# Patient Record
Sex: FEMALE | Race: WHITE | NOT HISPANIC OR LATINO | Employment: UNEMPLOYED | URBAN - METROPOLITAN AREA
[De-identification: names, ages, dates, MRNs, and addresses within clinical notes are randomized per-mention and may not be internally consistent; named-entity substitution may affect disease eponyms.]

---

## 2017-01-30 ENCOUNTER — OFFICE VISIT (OUTPATIENT)
Dept: FAMILY MEDICINE CLINIC | Facility: CLINIC | Age: 27
End: 2017-01-30

## 2017-01-30 VITALS
DIASTOLIC BLOOD PRESSURE: 70 MMHG | TEMPERATURE: 98.5 F | RESPIRATION RATE: 16 BRPM | BODY MASS INDEX: 27.28 KG/M2 | SYSTOLIC BLOOD PRESSURE: 100 MMHG | HEART RATE: 88 BPM | WEIGHT: 169 LBS

## 2017-01-30 DIAGNOSIS — J06.9 ACUTE URI: Primary | ICD-10-CM

## 2017-01-30 PROCEDURE — 99213 OFFICE O/P EST LOW 20 MIN: CPT | Performed by: FAMILY MEDICINE

## 2017-01-30 RX ORDER — AMOXICILLIN 500 MG/1
500 CAPSULE ORAL 3 TIMES DAILY
Qty: 30 CAPSULE | Refills: 0 | Status: SHIPPED | OUTPATIENT
Start: 2017-01-30 | End: 2017-02-09

## 2017-01-30 NOTE — MR AVS SNAPSHOT
Riana Terrazashadley   2017 11:30 AM   Office Visit    Provider:  George Hackett MD   Department:  [facility]   Dept Phone:  [phone]                Your Full Care Plan              Today's Medication Changes          These changes are accurate as of: 17 12:55 PM.  If you have any questions, ask your nurse or doctor.               New Medication(s)Ordered:     amoxicillin 500 MG capsule   Commonly known as:  AMOXIL   Take 1 capsule by mouth 3 (Three) Times a Day for 10 days.   Started by:  George Hackett MD            Where to Get Your Medications      These medications were sent to [address] [facility] - [phone] [facility] [phone]   [address], [place] [address]     Phone:  [phone]     amoxicillin 500 MG capsule                  Your Updated Medication List          This list is accurate as of: 17 12:55 PM.  Always use your most recent med list.                amoxicillin 500 MG capsule   Commonly known as:  AMOXIL   Take 1 capsule by mouth 3 (Three) Times a Day for 10 days.       DEXILANT 60 MG capsule   Generic drug:  dexlansoprazole   TAKE ONE CAPSULE BY MOUTH DAILY               You Were Diagnosed With        Codes Comments    Acute URI    -  Primary ICD-10-CM: J06.9  ICD-9-CM: 465.9       Instructions     None    Patient Instructions History      Kids Calendar Signup     [facility] Kids Calendar allows you to send messages to your doctor, view your test results, renew your prescriptions, schedule appointments, and more. To sign up, go to [employer] and click on the Sign Up Now link in the New User? box. Enter your Kids Calendar Activation Code exactly as it appears below along with the last four digits of your Social Security Number and your Date of Birth () to complete the sign-up process. If you do not sign up before the expiration date,  you must request a new code.    [facility] Activation Code: [email or web address]  Expires: 2/13/2017 12:55 PM    If you have questions, you can email [email or web address] or call [phone] to talk to our [facility] staff. Remember, [facility] is NOT to be used for urgent needs. For medical emergencies, dial 911.               Other Info from Your Visit           Allergies     No Known Allergies      Reason for Visit     URI           Vital Signs     Blood Pressure Pulse Temperature Respirations Weight Body Mass Index    100/70 88 98.5 °F (36.9 °C) 16 169 lb (76.7 kg) 27.28 kg/m2    Smoking Status                   Never Smoker           Problems and Diagnoses Noted     Acute upper respiratory infection    -  Primary

## 2017-01-30 NOTE — PROGRESS NOTES
Subjective   Riana Anthony is a 26 y.o. female.     History of Present Illness   Fever yesterday, otalgia and sinus pressure four days.  Took Dayquil.    The following portions of the patient's history were reviewed and updated as appropriate: allergies, current medications, past family history, past medical history, past social history, past surgical history and problem list.    Review of Systems   Constitutional: Negative.    HENT: Positive for congestion, ear pain, postnasal drip and sore throat.    Eyes: Negative.    Respiratory: Positive for cough.    Cardiovascular: Negative.    Gastrointestinal: Negative.    Neurological: Positive for headaches.       Objective   Physical Exam   Constitutional: She appears well-developed. No distress.   HENT:   Right Ear: Tympanic membrane is retracted.   Left Ear: Tympanic membrane is injected.   Mouth/Throat: Posterior oropharyngeal erythema present.   Neck: Neck supple.   Pulmonary/Chest: Effort normal and breath sounds normal.   Lymphadenopathy:     She has no cervical adenopathy.   Neurological: She is alert.   Vitals reviewed.      Assessment/Plan   Riana was seen today for uri.    Diagnoses and all orders for this visit:    Acute URI  -     amoxicillin (AMOXIL) 500 MG capsule; Take 1 capsule by mouth 3 (Three) Times a Day for 10 days.

## 2017-04-03 ENCOUNTER — OFFICE VISIT (OUTPATIENT)
Dept: FAMILY MEDICINE CLINIC | Facility: CLINIC | Age: 27
End: 2017-04-03

## 2017-04-03 VITALS
TEMPERATURE: 98.2 F | BODY MASS INDEX: 25.23 KG/M2 | OXYGEN SATURATION: 98 % | SYSTOLIC BLOOD PRESSURE: 98 MMHG | HEIGHT: 66 IN | DIASTOLIC BLOOD PRESSURE: 62 MMHG | HEART RATE: 78 BPM | WEIGHT: 157 LBS

## 2017-04-03 DIAGNOSIS — J02.0 STREP PHARYNGITIS: Primary | ICD-10-CM

## 2017-04-03 PROBLEM — R63.5 ABNORMAL WEIGHT GAIN: Status: ACTIVE | Noted: 2017-04-03

## 2017-04-03 PROBLEM — K21.9 GASTROESOPHAGEAL REFLUX DISEASE: Status: ACTIVE | Noted: 2017-04-03

## 2017-04-03 LAB
EXPIRATION DATE: ABNORMAL
INTERNAL CONTROL: ABNORMAL
Lab: ABNORMAL
S PYO AG THROAT QL: POSITIVE

## 2017-04-03 PROCEDURE — 87880 STREP A ASSAY W/OPTIC: CPT | Performed by: NURSE PRACTITIONER

## 2017-04-03 PROCEDURE — 99213 OFFICE O/P EST LOW 20 MIN: CPT | Performed by: NURSE PRACTITIONER

## 2017-04-03 RX ORDER — FLUCONAZOLE 150 MG/1
150 TABLET ORAL ONCE
Qty: 1 TABLET | Refills: 0 | Status: SHIPPED | OUTPATIENT
Start: 2017-04-03 | End: 2017-04-03

## 2017-04-03 RX ORDER — PENICILLIN V POTASSIUM 500 MG/1
500 TABLET ORAL 3 TIMES DAILY
Qty: 30 TABLET | Refills: 0 | Status: SHIPPED | OUTPATIENT
Start: 2017-04-03 | End: 2017-09-04

## 2017-04-03 NOTE — PROGRESS NOTES
Subjective   Riana Anthony is a 26 y.o. female.     History of Present Illness 5 day history of sore throat especially with swallowing and in the am. NO fever. She has nasal congestion, no discharge, sneezing, cough which is dry.  No sob or wheezing. Treated with Dayquil. No eye symptoms. Her child had a fever last week and was diagnosed with bronchitis and treated with bronchitis.    The following portions of the patient's history were reviewed and updated as appropriate: allergies, current medications, past family history, past medical history, past social history, past surgical history and problem list.    Review of Systems   Constitutional: Negative for fatigue, fever and unexpected weight change.   HENT: Positive for congestion, rhinorrhea and sore throat. Negative for hearing loss, nosebleeds, trouble swallowing and voice change.    Eyes: Negative for pain, discharge, redness and visual disturbance.   Respiratory: Positive for cough. Negative for chest tightness, shortness of breath and wheezing.    Cardiovascular: Negative for chest pain, palpitations and leg swelling.   Gastrointestinal: Negative for abdominal distention, abdominal pain, anal bleeding, blood in stool, constipation, diarrhea, nausea and vomiting.   Endocrine: Negative for cold intolerance, heat intolerance, polydipsia, polyphagia and polyuria.   Genitourinary: Negative for dysuria, flank pain, frequency and hematuria.   Musculoskeletal: Negative for arthralgias, gait problem, joint swelling and myalgias.   Skin: Negative for color change and rash.   Neurological: Negative for dizziness, tremors, seizures, syncope, speech difficulty, weakness, numbness and headaches.   Hematological: Negative.    Psychiatric/Behavioral: Negative.        Objective   Physical Exam   Constitutional: She is oriented to person, place, and time. She appears well-developed and well-nourished. No distress.   HENT:   Head: Normocephalic and atraumatic.   Right Ear:  Tympanic membrane and external ear normal.   Left Ear: Tympanic membrane and external ear normal.   Nose: Nose normal.   Mouth/Throat: Posterior oropharyngeal edema and posterior oropharyngeal erythema present. No oropharyngeal exudate.   Eyes: Conjunctivae are normal. Pupils are equal, round, and reactive to light. Right eye exhibits no discharge. Left eye exhibits no discharge. No scleral icterus.   Neck: Neck supple. No tracheal deviation present. No thyromegaly present.   Cardiovascular: Normal rate, regular rhythm and normal heart sounds.  Exam reveals no gallop and no friction rub.    No murmur heard.  Pulmonary/Chest: Effort normal and breath sounds normal. No respiratory distress. She has no wheezes.   Abdominal: Soft. Bowel sounds are normal. She exhibits no distension and no mass. There is no tenderness.   Musculoskeletal: She exhibits no edema or deformity.   Lymphadenopathy:     She has no cervical adenopathy.   Neurological: She is alert and oriented to person, place, and time. Coordination normal.   Skin: Skin is warm and dry. No rash noted. No erythema.   Psychiatric: She has a normal mood and affect. Her speech is normal and behavior is normal. Judgment and thought content normal.   Nursing note and vitals reviewed.      Assessment/Plan   Riana was seen today for sore throat.    Diagnoses and all orders for this visit:    Strep pharyngitis  -     penicillin v potassium (VEETID) 500 MG tablet; Take 1 tablet by mouth 3 (Three) Times a Day.  -     fluconazole (DIFLUCAN) 150 MG tablet; Take 1 tablet by mouth 1 (One) Time for 1 dose.  -     POC Rapid Strep A    Discussed the nature of the disease including, risks, complications, implications, management, safe and proper use of medications. Encouraged therapeutic lifestyle changes including low calorie diet with plenty of fruits and vegetables, daily exercise, medication compliance, and keeping scheduled follow up appointments with me and any other  providers. Encouraged patient to have appointment for complete physical, fasting labs, appropriate screenings, and immunizations on an annual basis.

## 2017-09-04 ENCOUNTER — OFFICE VISIT (OUTPATIENT)
Dept: FAMILY MEDICINE CLINIC | Facility: CLINIC | Age: 27
End: 2017-09-04

## 2017-09-04 VITALS
WEIGHT: 181 LBS | BODY MASS INDEX: 29.09 KG/M2 | TEMPERATURE: 98.1 F | SYSTOLIC BLOOD PRESSURE: 98 MMHG | HEIGHT: 66 IN | HEART RATE: 93 BPM | DIASTOLIC BLOOD PRESSURE: 62 MMHG | OXYGEN SATURATION: 97 %

## 2017-09-04 DIAGNOSIS — J02.9 ACUTE PHARYNGITIS, UNSPECIFIED ETIOLOGY: Primary | ICD-10-CM

## 2017-09-04 LAB
EXPIRATION DATE: NORMAL
INTERNAL CONTROL: NORMAL
Lab: NORMAL
S PYO AG THROAT QL: NEGATIVE

## 2017-09-04 PROCEDURE — 87880 STREP A ASSAY W/OPTIC: CPT | Performed by: NURSE PRACTITIONER

## 2017-09-04 PROCEDURE — 99214 OFFICE O/P EST MOD 30 MIN: CPT | Performed by: NURSE PRACTITIONER

## 2017-09-04 RX ORDER — FLUCONAZOLE 150 MG/1
150 TABLET ORAL DAILY
Qty: 2 TABLET | Refills: 0 | Status: SHIPPED | OUTPATIENT
Start: 2017-09-04 | End: 2017-09-06

## 2017-09-04 RX ORDER — OMEPRAZOLE 20 MG/1
20 CAPSULE, DELAYED RELEASE ORAL DAILY
COMMUNITY
End: 2017-12-15 | Stop reason: SDUPTHER

## 2017-09-04 RX ORDER — CEFDINIR 300 MG/1
300 CAPSULE ORAL 2 TIMES DAILY
Qty: 20 CAPSULE | Refills: 0 | Status: SHIPPED | OUTPATIENT
Start: 2017-09-04 | End: 2017-09-14

## 2017-09-04 NOTE — PROGRESS NOTES
Subjective   Riana Anthony is a 27 y.o. female.     Sore Throat    This is a new problem. The current episode started yesterday. The problem has been rapidly worsening. Neither side of throat is experiencing more pain than the other. Maximum temperature: subjective. The pain is moderate. Associated symptoms include congestion, ear pain, headaches, a plugged ear sensation and trouble swallowing (painful swallowing). Pertinent negatives include no abdominal pain, coughing, diarrhea, ear discharge, hoarse voice, neck pain, shortness of breath, stridor or vomiting. She has had no exposure to strep or mono. She has tried nothing for the symptoms.       The following portions of the patient's history were reviewed and updated as appropriate: allergies, current medications, past family history, past medical history, past social history, past surgical history and problem list.    Review of Systems   Constitutional: Positive for fatigue. Negative for activity change, appetite change, chills, diaphoresis and unexpected weight change.   HENT: Positive for congestion, ear pain, sore throat and trouble swallowing (painful swallowing). Negative for ear discharge, facial swelling, hoarse voice, postnasal drip, sinus pressure and voice change.    Respiratory: Negative for cough, choking, chest tightness, shortness of breath, wheezing and stridor.    Cardiovascular: Negative.    Gastrointestinal: Positive for nausea (mild). Negative for abdominal pain, diarrhea and vomiting.   Genitourinary: Negative for dysuria.   Musculoskeletal: Negative for arthralgias, myalgias and neck pain.   Skin: Negative for rash.   Neurological: Positive for headaches. Negative for dizziness.       Objective   Physical Exam   Constitutional: She is oriented to person, place, and time. She appears well-developed and well-nourished. She is cooperative. No distress.   HENT:   Head: Normocephalic and atraumatic.   Right Ear: External ear and ear canal normal.  Tympanic membrane is bulging. Tympanic membrane is not erythematous. A middle ear effusion is present.   Left Ear: External ear and ear canal normal. Tympanic membrane is bulging. Tympanic membrane is not erythematous. A middle ear effusion is present.   Nose: Mucosal edema present. Right sinus exhibits no maxillary sinus tenderness and no frontal sinus tenderness. Left sinus exhibits no maxillary sinus tenderness and no frontal sinus tenderness.   Mouth/Throat: Uvula is midline and mucous membranes are normal. Posterior oropharyngeal erythema (moderate) present.   Neck: Normal range of motion. Neck supple. No thyromegaly present.   Cardiovascular: Normal rate, regular rhythm and normal heart sounds.    Pulmonary/Chest: Effort normal and breath sounds normal.   Lymphadenopathy:     She has cervical adenopathy.   Neurological: She is alert and oriented to person, place, and time.   Skin: Skin is warm and dry. No rash noted. She is not diaphoretic.   Psychiatric: She has a normal mood and affect. Her behavior is normal. Judgment and thought content normal.   Vitals reviewed.      Assessment/Plan   Riana was seen today for sore throat and headache.    Diagnoses and all orders for this visit:    Acute pharyngitis, unspecified etiology  -     cefdinir (OMNICEF) 300 MG capsule; Take 1 capsule by mouth 2 (Two) Times a Day for 10 days.  -     fluconazole (DIFLUCAN) 150 MG tablet; Take 1 tablet by mouth Daily for 2 doses.  -     POC Rapid Strep A       Discussed the nature of the medical condtion including, risks, complications, implications, management, safe and proper use of medications.

## 2017-09-04 NOTE — PATIENT INSTRUCTIONS
Strep Throat  Strep throat is a bacterial infection of the throat. Your health care provider may call the infection tonsillitis or pharyngitis, depending on whether there is swelling in the tonsils or at the back of the throat. Strep throat is most common during the cold months of the year in children who are 5-15 years of age, but it can happen during any season in people of any age. This infection is spread from person to person (contagious) through coughing, sneezing, or close contact.  CAUSES  Strep throat is caused by the bacteria called Streptococcus pyogenes.  RISK FACTORS  This condition is more likely to develop in:  · People who spend time in crowded places where the infection can spread easily.  · People who have close contact with someone who has strep throat.  SYMPTOMS  Symptoms of this condition include:  · Fever or chills.    · Redness, swelling, or pain in the tonsils or throat.  · Pain or difficulty when swallowing.  · White or yellow spots on the tonsils or throat.  · Swollen, tender glands in the neck or under the jaw.  · Red rash all over the body (rare).  DIAGNOSIS  This condition is diagnosed by performing a rapid strep test or by taking a swab of your throat (throat culture test). Results from a rapid strep test are usually ready in a few minutes, but throat culture test results are available after one or two days.  TREATMENT  This condition is treated with antibiotic medicine.  HOME CARE INSTRUCTIONS  Medicines  · Take over-the-counter and prescription medicines only as told by your health care provider.  · Take your antibiotic as told by your health care provider. Do not stop taking the antibiotic even if you start to feel better.  · Have family members who also have a sore throat or fever tested for strep throat. They may need antibiotics if they have the strep infection.  Eating and Drinking  · Do not share food, drinking cups, or personal items that could cause the infection to spread to  other people.  · If swallowing is difficult, try eating soft foods until your sore throat feels better.  · Drink enough fluid to keep your urine clear or pale yellow.  General Instructions  · Gargle with a salt-water mixture 3-4 times per day or as needed. To make a salt-water mixture, completely dissolve ½-1 tsp of salt in 1 cup of warm water.  · Make sure that all household members wash their hands well.  · Get plenty of rest.  · Stay home from school or work until you have been taking antibiotics for 24 hours.  · Keep all follow-up visits as told by your health care provider. This is important.  SEEK MEDICAL CARE IF:  · The glands in your neck continue to get bigger.  · You develop a rash, cough, or earache.  · You cough up a thick liquid that is green, yellow-brown, or bloody.  · You have pain or discomfort that does not get better with medicine.  · Your problems seem to be getting worse rather than better.  · You have a fever.  SEEK IMMEDIATE MEDICAL CARE IF:  · You have new symptoms, such as vomiting, severe headache, stiff or painful neck, chest pain, or shortness of breath.  · You have severe throat pain, drooling, or changes in your voice.  · You have swelling of the neck, or the skin on the neck becomes red and tender.  · You have signs of dehydration, such as fatigue, dry mouth, and decreased urination.  · You become increasingly sleepy, or you cannot wake up completely.  · Your joints become red or painful.     This information is not intended to replace advice given to you by your health care provider. Make sure you discuss any questions you have with your health care provider.     Document Released: 12/15/2001 Document Revised: 09/07/2016 Document Reviewed: 04/11/2016  [employer] Interactive Patient Education ©2017 [employer]  Cefdinir capsules  What is this medicine?  CEFDINIR (SEF di ner) is a cephalosporin antibiotic. It is used to treat certain kinds of bacterial infections. It will not work for  colds, flu, or other viral infections.  This medicine may be used for other purposes; ask your health care provider or pharmacist if you have questions.  COMMON BRAND NAME(S): Jordan  What should I tell my health care provider before I take this medicine?  They need to know if you have any of these conditions:  -bleeding problems  -kidney disease  -stomach or intestine problems (especially colitis)  -an unusual or allergic reaction to cefdinir, other cephalosporin antibiotics, penicillin, penicillamine, other foods, dyes or preservatives  -pregnant or trying to get pregnant  -breast-feeding  How should I use this medicine?  Take this medicine by mouth. Swallow it with a drink of water. Follow the directions on the prescription label. You can take it with or without food. If it upsets your stomach it may help to take it with food. Take your doses at regular intervals. Do not take it more often than directed. Finish all the medicine you are prescribed even if you think your infection is better.  Talk to your pediatrician regarding the use of this medicine in children. Special care may be needed.  Overdosage: If you think you have taken too much of this medicine contact a poison control center or emergency room at once.  NOTE: This medicine is only for you. Do not share this medicine with others.  What if I miss a dose?  If you miss a dose, take it as soon as you can. If it is almost time for your next dose, take only that dose. Do not take double or extra doses.  What may interact with this medicine?  -antacids that contain aluminum or magnesium  -iron supplements  -other antibiotics  -probenecid  This list may not describe all possible interactions. Give your health care provider a list of all the medicines, herbs, non-prescription drugs, or dietary supplements you use. Also tell them if you smoke, drink alcohol, or use illegal drugs. Some items may interact with your medicine.  What should I watch for while using  this medicine?  Tell your doctor or health care professional if your symptoms do not get better in a few days.  If you are diabetic you may get a false-positive result for sugar in your urine. Check with your doctor or health care professional before you change your diet or the dose of your diabetes medicine.  What side effects may I notice from receiving this medicine?  Side effects that you should report to your doctor or health care professional as soon as possible:  -allergic reactions like skin rash, itching or hives, swelling of the face, lips, or tongue  -breathing problems  -fever or chills  -redness, blistering, peeling or loosening of the skin, including inside the mouth  -seizures  -severe or watery diarrhea  -sore throat  -swollen joints  -trouble passing urine or change in the amount of urine  -unusual bleeding or bruising  -unusually weak or tired  Side effects that usually do not require medical attention (report to your doctor or health care professional if they continue or are bothersome):  -constipation  -dizziness  -gas or heartburn  -headache  -loss of appetite  -nausea or vomiting  -stomach pain  -stool discoloration  -vaginal itching  This list may not describe all possible side effects. Call your doctor for medical advice about side effects. You may report side effects to FDA at 8-928-FDA-3132.  Where should I keep my medicine?  Keep out of the reach of children.  Store at room temperature between 15 and 30 degrees C (59 and 86 degrees F). Throw the medicine away after the expiration date.  NOTE: This sheet is a summary. It may not cover all possible information. If you have questions about this medicine, talk to your doctor, pharmacist, or health care provider.     © 2017, [employer]. (2017-01-17 11:12:19)

## 2017-12-15 RX ORDER — OMEPRAZOLE 20 MG/1
20 CAPSULE, DELAYED RELEASE ORAL DAILY
Qty: 30 CAPSULE | Refills: 4 | Status: SHIPPED | OUTPATIENT
Start: 2017-12-15 | End: 2018-05-22 | Stop reason: SDUPTHER

## 2018-02-14 ENCOUNTER — OFFICE VISIT (OUTPATIENT)
Dept: FAMILY MEDICINE CLINIC | Facility: CLINIC | Age: 28
End: 2018-02-14

## 2018-02-14 VITALS
OXYGEN SATURATION: 98 % | TEMPERATURE: 97.9 F | HEIGHT: 66 IN | BODY MASS INDEX: 31.82 KG/M2 | RESPIRATION RATE: 12 BRPM | WEIGHT: 198 LBS | SYSTOLIC BLOOD PRESSURE: 98 MMHG | HEART RATE: 93 BPM | DIASTOLIC BLOOD PRESSURE: 64 MMHG

## 2018-02-14 DIAGNOSIS — J02.9 SORE THROAT: ICD-10-CM

## 2018-02-14 DIAGNOSIS — J01.00 SUBACUTE MAXILLARY SINUSITIS: Primary | ICD-10-CM

## 2018-02-14 LAB
EXPIRATION DATE: NORMAL
INTERNAL CONTROL: NORMAL
Lab: NORMAL
S PYO AG THROAT QL: NEGATIVE

## 2018-02-14 PROCEDURE — 87880 STREP A ASSAY W/OPTIC: CPT | Performed by: NURSE PRACTITIONER

## 2018-02-14 PROCEDURE — 99213 OFFICE O/P EST LOW 20 MIN: CPT | Performed by: NURSE PRACTITIONER

## 2018-02-14 RX ORDER — AMOXICILLIN 875 MG/1
TABLET, COATED ORAL
COMMUNITY
Start: 2018-02-06 | End: 2018-02-14

## 2018-02-14 RX ORDER — IBUPROFEN 600 MG/1
TABLET ORAL
COMMUNITY
Start: 2018-02-06 | End: 2018-05-01

## 2018-02-14 RX ORDER — CEFDINIR 300 MG/1
300 CAPSULE ORAL 2 TIMES DAILY
Qty: 20 CAPSULE | Refills: 0 | Status: SHIPPED | OUTPATIENT
Start: 2018-02-14 | End: 2018-05-01

## 2018-02-14 NOTE — PROGRESS NOTES
Subjective   Riana Anthony is a 27 y.o. female.     History of Present Illness Complains of one week history of sinus pain, sinus congestion, post nasal drainage, cough. No treatment.  No fever. Has a headache. Denies sob, cough and wheezing.    The following portions of the patient's history were reviewed and updated as appropriate: allergies, current medications, past family history, past medical history, past social history, past surgical history and problem list.    Review of Systems   Constitutional: Negative for fatigue, fever and unexpected weight change.   HENT: Positive for congestion, nosebleeds, postnasal drip, rhinorrhea, sinus pain and sinus pressure. Negative for hearing loss, sore throat, trouble swallowing and voice change.    Eyes: Negative for pain, discharge, redness and visual disturbance.   Respiratory: Positive for cough. Negative for chest tightness, shortness of breath and wheezing.    Cardiovascular: Negative for chest pain, palpitations and leg swelling.   Gastrointestinal: Negative for abdominal distention, abdominal pain, anal bleeding, blood in stool, constipation, diarrhea, nausea and vomiting.   Endocrine: Negative for cold intolerance, heat intolerance, polydipsia, polyphagia and polyuria.   Genitourinary: Negative for dysuria, flank pain, frequency and hematuria.   Musculoskeletal: Negative for arthralgias, gait problem, joint swelling and myalgias.   Skin: Negative for color change and rash.   Neurological: Negative for dizziness, tremors, seizures, syncope, speech difficulty, weakness, numbness and headaches.   Hematological: Negative.    Psychiatric/Behavioral: Negative.        Objective   Physical Exam   Constitutional: She is oriented to person, place, and time. She appears well-developed and well-nourished. No distress.   HENT:   Head: Normocephalic and atraumatic.   Right Ear: Tympanic membrane and external ear normal.   Left Ear: Tympanic membrane and external ear normal.    Nose: Nose normal.   Mouth/Throat: Oropharynx is clear and moist. No oropharyngeal exudate.   Eyes: Conjunctivae are normal. Pupils are equal, round, and reactive to light. Right eye exhibits no discharge. Left eye exhibits no discharge. No scleral icterus.   Neck: Neck supple. No tracheal deviation present. No thyromegaly present.   Cardiovascular: Normal rate, regular rhythm and normal heart sounds.  Exam reveals no gallop and no friction rub.    No murmur heard.  Pulmonary/Chest: Effort normal and breath sounds normal. No respiratory distress. She has no wheezes.   Abdominal: Soft. Bowel sounds are normal. She exhibits no distension and no mass. There is no tenderness.   Musculoskeletal: She exhibits no edema or deformity.   Lymphadenopathy:     She has no cervical adenopathy.   Neurological: She is alert and oriented to person, place, and time. Coordination normal.   Skin: Skin is warm and dry. No rash noted. No erythema.   Psychiatric: She has a normal mood and affect. Her speech is normal and behavior is normal. Judgment and thought content normal.   Nursing note and vitals reviewed.      Assessment/Plan   Riana was seen today for earache, sore throat and headache.    Diagnoses and all orders for this visit:    Subacute maxillary sinusitis  -     cefdinir (OMNICEF) 300 MG capsule; Take 1 capsule by mouth 2 (Two) Times a Day.  -     loratadine-pseudoephedrine (CLARITIN-D 12 HOUR) 5-120 MG per 12 hr tablet; Take 1 tablet by mouth 2 (Two) Times a Day.    Discussed the nature of the disease including, risks, complications, implications, management, safe and proper use of medications. Encouraged therapeutic lifestyle changes including low calorie diet with plenty of fruits and vegetables, daily exercise, medication compliance, and keeping scheduled follow up appointments with me and any other providers. Encouraged patient to have appointment for complete physical, fasting labs, appropriate screenings, and  immunizations on an annual basis.  Follow up symptoms persist or worsen.

## 2018-04-27 ENCOUNTER — LAB REQUISITION (OUTPATIENT)
Dept: LAB | Facility: HOSPITAL | Age: 28
End: 2018-04-27

## 2018-04-27 ENCOUNTER — TRANSCRIBE ORDERS (OUTPATIENT)
Dept: LAB | Facility: HOSPITAL | Age: 28
End: 2018-04-27

## 2018-04-27 DIAGNOSIS — Z00.00 ROUTINE GENERAL MEDICAL EXAMINATION AT A HEALTH CARE FACILITY: ICD-10-CM

## 2018-04-27 DIAGNOSIS — R63.5 ABNORMAL WEIGHT GAIN: Primary | ICD-10-CM

## 2018-04-27 PROCEDURE — 36415 COLL VENOUS BLD VENIPUNCTURE: CPT | Performed by: NURSE PRACTITIONER

## 2018-04-30 ENCOUNTER — TELEPHONE (OUTPATIENT)
Dept: FAMILY MEDICINE CLINIC | Facility: CLINIC | Age: 28
End: 2018-04-30

## 2018-04-30 NOTE — TELEPHONE ENCOUNTER
----- Message from Concepcion Zelaya Rep sent at 4/30/2018  2:36 PM EDT -----  Regarding: LABS  Contact: [phone]  PT WOULD LIKE TO KNOW IF YOU HAVE THE LABS FROM HER OTHER Dr BEFORE SHE COMES IN FOR HER APPT TOMORROW    Left msg for pt that labs were in her chart.  Will be able to review at appt tomorrow.  BBmarly, WALTER

## 2018-04-30 NOTE — TELEPHONE ENCOUNTER
----- Message from Rola Singer sent at 4/30/2018 10:18 AM EDT -----  Regarding: ABNORMAL LFT  Contact: [phone]  PT IS SCHED WITH DR MATHIS TOMORROW AND SHE WANTS YOU TO CALL HER OBGYN TO OBTAIN HER LABS--SPECIFICALLY THE LFT   DR DARWIN REYNOLDS--[phone]  PLEASE CALL PT TO LET HER KNOW THIS HAS BEEN DONE AND THAT THEY ARE RECVD    Most recent lab work is in pt's chart.  BBmarly, CMA

## 2018-05-01 ENCOUNTER — OFFICE VISIT (OUTPATIENT)
Dept: FAMILY MEDICINE CLINIC | Facility: CLINIC | Age: 28
End: 2018-05-01

## 2018-05-01 VITALS
RESPIRATION RATE: 16 BRPM | BODY MASS INDEX: 32.12 KG/M2 | DIASTOLIC BLOOD PRESSURE: 60 MMHG | WEIGHT: 199 LBS | TEMPERATURE: 98 F | SYSTOLIC BLOOD PRESSURE: 90 MMHG | HEART RATE: 78 BPM | OXYGEN SATURATION: 98 %

## 2018-05-01 DIAGNOSIS — R79.89 ELEVATED LIVER FUNCTION TESTS: ICD-10-CM

## 2018-05-01 DIAGNOSIS — K21.9 GASTROESOPHAGEAL REFLUX DISEASE, ESOPHAGITIS PRESENCE NOT SPECIFIED: Primary | ICD-10-CM

## 2018-05-01 PROCEDURE — 99213 OFFICE O/P EST LOW 20 MIN: CPT | Performed by: FAMILY MEDICINE

## 2018-05-01 NOTE — PROGRESS NOTES
Subjective   Riana Anthony is a 27 y.o. female.     History of Present Illness   Lab tests show mild elevation of liver functions. AST 39, ALT 66 and Alk phosph 127. Past of excess stomach acid production and takes Prolosec bid.  Bloating a problem. Wakes with stomach discomfort.  No diarrhea or constipation.  No surgery.  Had EGD about five years ago.   No back pain. Rare itching. Problem with nausea with pregnancy. No hepatitis, no tobacco use, no alcohol.  Uses vapor inhaler twice a day.   The following portions of the patient's history were reviewed and updated as appropriate: allergies, current medications, past family history, past medical history, past social history, past surgical history and problem list.    Review of Systems   Constitutional: Positive for unexpected weight change. Fever: gain.   HENT: Negative.    Eyes: Negative for visual disturbance.   Respiratory: Negative.    Cardiovascular: Negative.    Gastrointestinal: Positive for abdominal distention, abdominal pain and nausea. Negative for constipation and diarrhea.   Musculoskeletal: Negative for arthralgias.   Neurological: Negative for headaches.       Objective   Physical Exam   Constitutional: She appears well-developed and well-nourished. No distress.   Neck: Neck supple. No thyromegaly present.   Cardiovascular: Normal heart sounds.    Pulmonary/Chest: Breath sounds normal.   Abdominal: Soft. Bowel sounds are normal. She exhibits no distension. There is no hepatosplenomegaly. There is no tenderness.   Musculoskeletal: She exhibits no edema.   Lymphadenopathy:     She has no cervical adenopathy.   Neurological: She is alert.   Skin: No rash noted.   Vitals reviewed.      Assessment/Plan   Riana was seen today for follow-up.    Diagnoses and all orders for this visit:    Gastroesophageal reflux disease, esophagitis presence not specified  -     Ambulatory Referral to Gastroenterology    Elevated liver function tests  -     Ambulatory Referral to  Gastroenterology  -     US Liver; Future

## 2018-05-03 ENCOUNTER — LAB REQUISITION (OUTPATIENT)
Dept: LAB | Facility: HOSPITAL | Age: 28
End: 2018-05-03

## 2018-05-03 ENCOUNTER — TRANSCRIBE ORDERS (OUTPATIENT)
Dept: NUTRITION | Facility: HOSPITAL | Age: 28
End: 2018-05-03

## 2018-05-03 ENCOUNTER — OFFICE VISIT (OUTPATIENT)
Dept: GASTROENTEROLOGY | Facility: CLINIC | Age: 28
End: 2018-05-03

## 2018-05-03 VITALS
TEMPERATURE: 96.2 F | DIASTOLIC BLOOD PRESSURE: 80 MMHG | OXYGEN SATURATION: 98 % | BODY MASS INDEX: 32.24 KG/M2 | HEART RATE: 91 BPM | HEIGHT: 66 IN | WEIGHT: 200.6 LBS | SYSTOLIC BLOOD PRESSURE: 100 MMHG

## 2018-05-03 DIAGNOSIS — R10.13 DYSPEPSIA: ICD-10-CM

## 2018-05-03 DIAGNOSIS — K21.9 GASTROESOPHAGEAL REFLUX DISEASE, ESOPHAGITIS PRESENCE NOT SPECIFIED: Primary | ICD-10-CM

## 2018-05-03 DIAGNOSIS — R79.89 ABNORMAL LIVER FUNCTION TESTS: ICD-10-CM

## 2018-05-03 DIAGNOSIS — R63.5 ABNORMAL WEIGHT GAIN: Primary | ICD-10-CM

## 2018-05-03 DIAGNOSIS — R10.13 EPIGASTRIC PAIN: ICD-10-CM

## 2018-05-03 DIAGNOSIS — R79.89 OTHER SPECIFIED ABNORMAL FINDINGS OF BLOOD CHEMISTRY: ICD-10-CM

## 2018-05-03 DIAGNOSIS — R14.0 BLOATING: ICD-10-CM

## 2018-05-03 DIAGNOSIS — Z00.00 ROUTINE GENERAL MEDICAL EXAMINATION AT A HEALTH CARE FACILITY: ICD-10-CM

## 2018-05-03 PROCEDURE — 99245 OFF/OP CONSLTJ NEW/EST HI 55: CPT | Performed by: INTERNAL MEDICINE

## 2018-05-03 PROCEDURE — 36415 COLL VENOUS BLD VENIPUNCTURE: CPT | Performed by: INTERNAL MEDICINE

## 2018-05-03 NOTE — PATIENT INSTRUCTIONS
Gastroesophageal Reflux Disease, Adult  Normally, food travels down the esophagus and stays in the stomach to be digested. However, when a person has gastroesophageal reflux disease (GERD), food and stomach acid move back up into the esophagus. When this happens, the esophagus becomes sore and inflamed. Over time, GERD can create small holes (ulcers) in the lining of the esophagus.  What are the causes?  This condition is caused by a problem with the muscle between the esophagus and the stomach (lower esophageal sphincter, or LES). Normally, the LES muscle closes after food passes through the esophagus to the stomach. When the LES is weakened or abnormal, it does not close properly, and that allows food and stomach acid to go back up into the esophagus. The LES can be weakened by certain dietary substances, medicines, and medical conditions, including:  · Tobacco use.  · Pregnancy.  · Having a hiatal hernia.  · Heavy alcohol use.  · Certain foods and beverages, such as coffee, chocolate, onions, and peppermint.  What increases the risk?  This condition is more likely to develop in:  · People who have an increased body weight.  · People who have connective tissue disorders.  · People who use NSAID medicines.  What are the signs or symptoms?  Symptoms of this condition include:  · Heartburn.  · Difficult or painful swallowing.  · The feeling of having a lump in the throat.  · A bitter taste in the mouth.  · Bad breath.  · Having a large amount of saliva.  · Having an upset or bloated stomach.  · Belching.  · Chest pain.  · Shortness of breath or wheezing.  · Ongoing (chronic) cough or a night-time cough.  · Wearing away of tooth enamel.  · Weight loss.  Different conditions can cause chest pain. Make sure to see your health care provider if you experience chest pain.  How is this diagnosed?  Your health care provider will take a medical history and perform a physical exam. To determine if you have mild or severe GERD,  your health care provider may also monitor how you respond to treatment. You may also have other tests, including:  · An endoscopy to examine your stomach and esophagus with a small camera.  · A test that measures the acidity level in your esophagus.  · A test that measures how much pressure is on your esophagus.  · A barium swallow or modified barium swallow to show the shape, size, and functioning of your esophagus.  How is this treated?  The goal of treatment is to help relieve your symptoms and to prevent complications. Treatment for this condition may vary depending on how severe your symptoms are. Your health care provider may recommend:  · Changes to your diet.  · Medicine.  · Surgery.  Follow these instructions at home:  Diet   · Follow a diet as recommended by your health care provider. This may involve avoiding foods and drinks such as:  ¨ Coffee and tea (with or without caffeine).  ¨ Drinks that contain alcohol.  ¨ Energy drinks and sports drinks.  ¨ Carbonated drinks or sodas.  ¨ Chocolate and cocoa.  ¨ Peppermint and mint flavorings.  ¨ Garlic and onions.  ¨ Horseradish.  ¨ Spicy and acidic foods, including peppers, chili powder, akhtar powder, vinegar, hot sauces, and barbecue sauce.  ¨ Citrus fruit juices and citrus fruits, such as oranges, janet, and limes.  ¨ Tomato-based foods, such as red sauce, chili, salsa, and pizza with red sauce.  ¨ Fried and fatty foods, such as donuts, french fries, potato chips, and high-fat dressings.  ¨ High-fat meats, such as hot dogs and fatty cuts of red and white meats, such as rib eye steak, sausage, ham, and larsen.  ¨ High-fat dairy items, such as whole milk, butter, and cream cheese.  · Eat small, frequent meals instead of large meals.  · Avoid drinking large amounts of liquid with your meals.  · Avoid eating meals during the 2-3 hours before bedtime.  · Avoid lying down right after you eat.  · Do not exercise right after you eat.  General instructions   · Pay  attention to any changes in your symptoms.  · Take over-the-counter and prescription medicines only as told by your health care provider. Do not take aspirin, ibuprofen, or other NSAIDs unless your health care provider told you to do so.  · Do not use any tobacco products, including cigarettes, chewing tobacco, and e-cigarettes. If you need help quitting, ask your health care provider.  · Wear loose-fitting clothing. Do not wear anything tight around your waist that causes pressure on your abdomen.  · Raise (elevate) the head of your bed 6 inches (15cm).  · Try to reduce your stress, such as with yoga or meditation. If you need help reducing stress, ask your health care provider.  · If you are overweight, reduce your weight to an amount that is healthy for you. Ask your health care provider for guidance about a safe weight loss goal.  · Keep all follow-up visits as told by your health care provider. This is important.  Contact a health care provider if:  · You have new symptoms.  · You have unexplained weight loss.  · You have difficulty swallowing, or it hurts to swallow.  · You have wheezing or a persistent cough.  · Your symptoms do not improve with treatment.  · You have a hoarse voice.  Get help right away if:  · You have pain in your arms, neck, jaw, teeth, or back.  · You feel sweaty, dizzy, or light-headed.  · You have chest pain or shortness of breath.  · You vomit and your vomit looks like blood or coffee grounds.  · You faint.  · Your stool is bloody or black.  · You cannot swallow, drink, or eat.  This information is not intended to replace advice given to you by your health care provider. Make sure you discuss any questions you have with your health care provider.  Document Released: 09/27/2006 Document Revised: 05/17/2017 Document Reviewed: 04/13/2016  [employer] Interactive Patient Education © 2017 [employer]

## 2018-05-03 NOTE — PROGRESS NOTES
PCP: George Hackett MD    Chief Complaint   Patient presents with   • Elevated Hepatic Enzymes       History of Present Illness:   HPI  I appreciate the consult for GERD and elevated liver tests.  Mrs. Anthony  is a 28 yo with a history of GERD who was  diagnosed about 5 years ago.  She states an upper endoscopy was performed  at [facility].  She was started on Dexilant but the medication was very expensive and was switched to Prilosec.  She has required Prilosec twice daily.  She admits to weight gain over the last couple of years of about 60 pounds.  The patient is now on a diet with [employer] that does cause her some stomach upset.  She denies any difficult or painful swallowing.  The patient admits to more issues with heartburn when she lies down at night.  There is no history of nausea or vomiting.  She does admit to some bloating with meals.  Mrs. Anthony denies any change in bowel habits or signs of gastro intestinal bleeding.  There is no history of night sweats, fever or chills.  The patient is unaware of any history of gallbladder problems.  The patient denies any family history of liver disease.  Mrs. Anthony denies any family history of autoimmune diseases.  There is no history of jaundice.  She denies any itching or unexplained rashes.  Past Medical History:   Diagnosis Date   • GERD (gastroesophageal reflux disease)        Past Surgical History:   Procedure Laterality Date   • NO PAST SURGERIES           Current Outpatient Prescriptions:   •  omeprazole (priLOSEC) 20 MG capsule, Take 1 capsule by mouth Daily. (Patient taking differently: Take 40 mg by mouth Daily.), Disp: 30 capsule, Rfl: 4    No Known Allergies    Family History   Problem Relation Age of Onset   • No Known Problems Mother    • No Known Problems Father    • No Known Problems Sister    • No Known Problems Brother    • No Known Problems Maternal Grandmother    • No Known Problems Maternal Grandfather    • No Known Problems  Paternal Grandmother    • No Known Problems Paternal Grandfather        Social History     Social History   • Marital status:      Spouse name: N/A   • Number of children: N/A   • Years of education: N/A     Occupational History   • stay at home mom      Social History Main Topics   • Smoking status: Never Smoker   • Smokeless tobacco: Never Used   • Alcohol use No   • Drug use: No   • Sexual activity: Yes     Partners: Male     Other Topics Concern   • Not on file     Social History Narrative   • No narrative on file       Review of Systems   Constitutional: Negative for activity change, appetite change, fatigue, fever and unexpected weight change (gain).   HENT: Negative for dental problem, hearing loss, mouth sores, postnasal drip, sneezing, trouble swallowing and voice change.    Eyes: Negative for pain, redness, itching and visual disturbance.   Respiratory: Negative for cough, choking, chest tightness, shortness of breath and wheezing.    Cardiovascular: Negative for chest pain, palpitations and leg swelling.   Gastrointestinal: Positive for abdominal distention (bloating & gas) and abdominal pain. Negative for anal bleeding, blood in stool, constipation, diarrhea, nausea, rectal pain and vomiting.        Reflux   Endocrine: Negative for cold intolerance, heat intolerance, polydipsia, polyphagia and polyuria.   Genitourinary: Negative.  Negative for dysuria, enuresis, flank pain, hematuria and urgency.   Musculoskeletal: Negative for arthralgias, back pain, gait problem, joint swelling and myalgias.   Skin: Negative for color change, pallor and rash.   Allergic/Immunologic: Negative for environmental allergies, food allergies and immunocompromised state.   Neurological: Negative for dizziness, tremors, seizures, facial asymmetry, speech difficulty, numbness and headaches.   Hematological: Negative for adenopathy.   Psychiatric/Behavioral: Negative for behavioral problems, confusion, dysphoric mood,  hallucinations and self-injury.       Vitals:    05/03/18 1403   BP: 100/80   Pulse: 91   Temp: 96.2 °F (35.7 °C)   SpO2: 98%       Physical Exam   Constitutional: She is oriented to person, place, and time. She appears well-nourished. No distress.   HENT:   Head: Normocephalic and atraumatic.   Mouth/Throat: Oropharynx is clear and moist. No oropharyngeal exudate.   Eyes: Conjunctivae and EOM are normal. Pupils are equal, round, and reactive to light. No scleral icterus.   Neck: Neck supple. No thyromegaly present.   Cardiovascular: Normal rate, regular rhythm and normal heart sounds.  Exam reveals no gallop.    No murmur heard.  Pulmonary/Chest: Effort normal and breath sounds normal. She has no wheezes. She has no rales.   Abdominal: Soft. Bowel sounds are normal. Tenderness: epigastrium. There is no rebound and no guarding.   Musculoskeletal: Normal range of motion. She exhibits no edema or tenderness.   Lymphadenopathy:     She has no cervical adenopathy.   Neurological: She is alert and oriented to person, place, and time. She exhibits normal muscle tone.   Skin: Skin is dry. No rash noted.   No spider nevi, no palmar erythema   Psychiatric: She has a normal mood and affect. Her behavior is normal. Thought content normal.   Vitals reviewed.      Riana was seen today for elevated hepatic enzymes.    Diagnoses and all orders for this visit:    Gastroesophageal reflux disease, esophagitis presence not specified  -     Esophagogastroduodenoscopy; Future    Epigastric pain  -     Esophagogastroduodenoscopy; Future  -     Celiac Ab tTG DGP TIgA; Future    Bloating  -     Esophagogastroduodenoscopy; Future  -     Celiac Ab tTG DGP TIgA; Future    Abnormal liver function tests  -     Anti-Smooth Muscle Antibody Titer  -     Mitochondrial Antibodies, M2  -     Nuclear Antigen Antibody, IFA; Future  -     Hepatitis Panel, Acute  -     Celiac Ab tTG DGP TIgA; Future  -     Ceruloplasmin  -     Ferritin  -     Iron  Profile  -     Alpha - 1 - Antitrypsin Phenotype; Future    Dyspepsia  -     Esophagogastroduodenoscopy; Future  -     Celiac Ab tTG DGP TIgA; Future    The patient has GERD but no warning signs of dysphagia or weight loss.  The differential diagnosis also includes celiac disease, H. pylori and peptic ulcer disease.  Also cannot exclude gallbladder pathology.  The patient has gained weight over the last few years and likely has nonalcohol fatty liver disease.  However, autoimmune, viral, Jesse's disease and iron overload issues should be ruled out.      Plan: Will schedule an EGD with biopsies for further evaluation.           Will obtain a copy of the previous EGD and path report.           Will obtain acute hepatitis panel, autoimmune serology, ceruloplasmin, ferritin, iron profile and alpha-1 phenotype.           Proceed with ultrasound scheduled for tomorrow.           Will give the patient a handout on GERD.           Encourage gradual weight loss.    I spent over 50% of the office visit counseling and answering questions from the patient.

## 2018-05-04 ENCOUNTER — HOSPITAL ENCOUNTER (OUTPATIENT)
Dept: ULTRASOUND IMAGING | Facility: HOSPITAL | Age: 28
Discharge: HOME | End: 2018-05-04
Admitting: FAMILY MEDICINE

## 2018-05-04 DIAGNOSIS — R79.89 ELEVATED LIVER FUNCTION TESTS: ICD-10-CM

## 2018-05-04 PROCEDURE — 76705 ECHO EXAM OF ABDOMEN: CPT

## 2018-05-07 ENCOUNTER — TELEPHONE (OUTPATIENT)
Dept: FAMILY MEDICINE CLINIC | Facility: CLINIC | Age: 28
End: 2018-05-07

## 2018-05-07 LAB
A1AT PHENOTYP SERPL IFE: NORMAL
A1AT SERPL-MCNC: 128 MG/DL (ref 90–200)
ACTIN IGG SERPL-ACNC: 8 UNITS (ref 0–19)
ANA TITR SER IF: NEGATIVE {TITER}
CERULOPLASMIN SERPL-MCNC: 36.4 MG/DL (ref 19–39)
FERRITIN SERPL-MCNC: 24 NG/ML (ref 10–291)
GLIADIN PEPTIDE IGA SER-ACNC: 6 UNITS (ref 0–19)
GLIADIN PEPTIDE IGG SER-ACNC: 4 UNITS (ref 0–19)
HAV IGM SERPL QL IA: NEGATIVE
HBV CORE IGM SERPL QL IA: NEGATIVE
HBV SURFACE AG SERPL QL IA: NEGATIVE
HCV AB S/CO SERPL IA: <0.1 S/CO RATIO (ref 0–0.9)
IGA SERPL-MCNC: 171 MG/DL (ref 87–352)
IRON SATN MFR SERPL: 7 % (ref 15–50)
IRON SERPL-MCNC: 31 MCG/DL (ref 50–175)
MITOCHONDRIA M2 IGG SER-ACNC: <20 UNITS (ref 0–20)
TIBC SERPL-MCNC: 434 MCG/DL (ref 250–450)
TTG IGA SER-ACNC: <2 U/ML (ref 0–3)
TTG IGG SER-ACNC: 4 U/ML (ref 0–5)
UIBC SERPL-MCNC: 403 MCG/DL

## 2018-05-08 ENCOUNTER — OUTSIDE FACILITY SERVICE (OUTPATIENT)
Dept: GASTROENTEROLOGY | Facility: CLINIC | Age: 28
End: 2018-05-08

## 2018-05-08 ENCOUNTER — RESULTS ENCOUNTER (OUTPATIENT)
Dept: GASTROENTEROLOGY | Facility: CLINIC | Age: 28
End: 2018-05-08

## 2018-05-08 ENCOUNTER — LAB REQUISITION (OUTPATIENT)
Dept: LAB | Facility: HOSPITAL | Age: 28
End: 2018-05-08

## 2018-05-08 DIAGNOSIS — K21.9 GASTRO-ESOPHAGEAL REFLUX DISEASE WITHOUT ESOPHAGITIS: ICD-10-CM

## 2018-05-08 DIAGNOSIS — R79.89 ABNORMAL LIVER FUNCTION TESTS: ICD-10-CM

## 2018-05-08 DIAGNOSIS — K20.90 ESOPHAGITIS: ICD-10-CM

## 2018-05-08 DIAGNOSIS — R10.13 EPIGASTRIC PAIN: ICD-10-CM

## 2018-05-08 DIAGNOSIS — K29.70 GASTRITIS WITHOUT BLEEDING: ICD-10-CM

## 2018-05-08 DIAGNOSIS — K44.9 DIAPHRAGMATIC HERNIA WITHOUT OBSTRUCTION OR GANGRENE: ICD-10-CM

## 2018-05-08 DIAGNOSIS — R14.0 BLOATING: ICD-10-CM

## 2018-05-08 DIAGNOSIS — K30 FUNCTIONAL DYSPEPSIA: ICD-10-CM

## 2018-05-08 DIAGNOSIS — R10.13 DYSPEPSIA: ICD-10-CM

## 2018-05-08 PROCEDURE — 43239 EGD BIOPSY SINGLE/MULTIPLE: CPT | Performed by: INTERNAL MEDICINE

## 2018-05-08 PROCEDURE — 88305 TISSUE EXAM BY PATHOLOGIST: CPT | Performed by: INTERNAL MEDICINE

## 2018-05-09 ENCOUNTER — TELEPHONE (OUTPATIENT)
Dept: FAMILY MEDICINE CLINIC | Facility: CLINIC | Age: 28
End: 2018-05-09

## 2018-05-09 LAB
CYTO UR: NORMAL
LAB AP CASE REPORT: NORMAL
LAB AP CLINICAL INFORMATION: NORMAL
Lab: NORMAL
PATH REPORT.FINAL DX SPEC: NORMAL
PATH REPORT.GROSS SPEC: NORMAL

## 2018-05-09 NOTE — TELEPHONE ENCOUNTER
----- Message from Concepcion Zelaya Rep sent at 5/9/2018 11:28 AM EDT -----  Regarding: FAX LABS  Contact: [phone]  PT WOULD LIKE TO HAVE HER LAB RESULTS TO HER FAXED TO HER OB/GYN [phone] DARWIN REYNOLDS. THEY NEED THEM TO PRESCRIBE SOME MEDS TO HER.      Results have been faxed.  Suzi, CMA

## 2018-05-10 ENCOUNTER — TELEPHONE (OUTPATIENT)
Dept: FAMILY MEDICINE CLINIC | Facility: CLINIC | Age: 28
End: 2018-05-10

## 2018-05-10 NOTE — TELEPHONE ENCOUNTER
----- Message from Charo Coffey MA sent at 5/9/2018  3:11 PM EDT -----  Regarding: FW: Prescription Question  Contact: [phone]      ----- Message -----  From: Riana Anthony  Sent: 5/9/2018   2:33 PM  To: [facility] [employer] [facility] [place] [facility]  Subject: Prescription Question                            hello my obgyn wont pescripe me diethylpropion for weightloss although my bmi is 32 and maria c trying hard to lose weight but i cant until she gets a letter from my primary doc saying its fine to prescripe that for me so can dr muhammad prescripe it for me and save trouble of sending a letter or any other appetite suppresant that doesnt contain  caffine or a stimulant like diethylpropion. thank you so much my obgn is a widwife rigo campos i gave you her fax earlier and her number is ?[phone]?    Notified pt that Dr. Hackett does not write for any type of weight loss medications.  BBmarly, CMA

## 2018-05-11 ENCOUNTER — TELEPHONE (OUTPATIENT)
Dept: GASTROENTEROLOGY | Facility: CLINIC | Age: 28
End: 2018-05-11

## 2018-05-11 NOTE — TELEPHONE ENCOUNTER
----- Message from Lit Larson MD sent at 5/11/2018 12:21 PM EDT -----  Let Mrs. Anthony know there was evidence for reflux on the esophagus biopsies.  There was also some reactive inflammation in the stomach without H. Pylori.  She can continue the Prilosec but if no relief can try Dexilant again or other proton pump medication.  Also avoid any nonsteroidal medication and aspirin.  Thanks,  GMW

## 2018-05-11 NOTE — TELEPHONE ENCOUNTER
GAVE  BIOPSY RESULTS. HE WILL CALL BACK; IF PATIENT WANTS TO TRY DIFFERENT PPI.  DEXILANT ISN'T COVERED ON HER INSURANCE PLAN.

## 2018-05-23 RX ORDER — OMEPRAZOLE 20 MG/1
20 CAPSULE, DELAYED RELEASE ORAL DAILY
Qty: 30 CAPSULE | Refills: 4 | Status: SHIPPED | OUTPATIENT
Start: 2018-05-23 | End: 2021-06-15

## 2018-05-23 RX ORDER — OMEPRAZOLE 20 MG/1
CAPSULE, DELAYED RELEASE ORAL
Qty: 30 CAPSULE | Refills: 3 | OUTPATIENT
Start: 2018-05-23

## 2018-05-25 ENCOUNTER — TELEPHONE (OUTPATIENT)
Dept: FAMILY MEDICINE CLINIC | Facility: CLINIC | Age: 28
End: 2018-05-25

## 2018-05-25 RX ORDER — RANITIDINE 150 MG/1
150 CAPSULE ORAL 2 TIMES DAILY
Qty: 60 CAPSULE | Refills: 3 | Status: SHIPPED | OUTPATIENT
Start: 2018-05-25 | End: 2018-10-18

## 2018-05-25 NOTE — TELEPHONE ENCOUNTER
Pharmacy called requesting we switch patient's Rx for Omeprazole to ranitidine or famotidine. Please advise.     Abdoul with [facility] [phone]

## 2018-12-19 ENCOUNTER — OFFICE VISIT (OUTPATIENT)
Dept: FAMILY MEDICINE CLINIC | Facility: CLINIC | Age: 28
End: 2018-12-19

## 2018-12-19 VITALS
SYSTOLIC BLOOD PRESSURE: 98 MMHG | OXYGEN SATURATION: 97 % | DIASTOLIC BLOOD PRESSURE: 62 MMHG | RESPIRATION RATE: 18 BRPM | WEIGHT: 203 LBS | TEMPERATURE: 98.7 F | HEART RATE: 96 BPM | BODY MASS INDEX: 32.62 KG/M2 | HEIGHT: 66 IN

## 2018-12-19 DIAGNOSIS — J01.80 OTHER ACUTE SINUSITIS, RECURRENCE NOT SPECIFIED: ICD-10-CM

## 2018-12-19 DIAGNOSIS — B37.31 YEAST VAGINITIS: ICD-10-CM

## 2018-12-19 DIAGNOSIS — J02.9 SORE THROAT: Primary | ICD-10-CM

## 2018-12-19 LAB
EXPIRATION DATE: NORMAL
INTERNAL CONTROL: NORMAL
Lab: NORMAL
S PYO AG THROAT QL: NEGATIVE

## 2018-12-19 PROCEDURE — 99213 OFFICE O/P EST LOW 20 MIN: CPT | Performed by: NURSE PRACTITIONER

## 2018-12-19 PROCEDURE — 87880 STREP A ASSAY W/OPTIC: CPT | Performed by: NURSE PRACTITIONER

## 2018-12-19 RX ORDER — FLUCONAZOLE 150 MG/1
150 TABLET ORAL ONCE
Qty: 1 TABLET | Refills: 0 | Status: SHIPPED | OUTPATIENT
Start: 2018-12-19 | End: 2018-12-19

## 2018-12-19 RX ORDER — AMOXICILLIN 500 MG/1
500 TABLET, FILM COATED ORAL 2 TIMES DAILY
Qty: 20 TABLET | Refills: 0 | Status: SHIPPED | OUTPATIENT
Start: 2018-12-19 | End: 2020-01-14

## 2018-12-19 RX ORDER — FLUOXETINE HYDROCHLORIDE 20 MG/1
40 CAPSULE ORAL 2 TIMES DAILY
COMMUNITY
Start: 2018-12-14 | End: 2021-11-08

## 2018-12-19 NOTE — PROGRESS NOTES
Subjective   Riana Anthony is a 28 y.o. female.     History of Present Illness Her son was diagnosed with strep. Now she has sore throat, headache, sinus pressure, post nasal congestion, and slight cough. No chest pain. No fever. Treated with Tylenol.    The following portions of the patient's history were reviewed and updated as appropriate: allergies, current medications, past family history, past medical history, past social history, past surgical history and problem list.    Review of Systems   Constitutional: Negative for appetite change, fever, unexpected weight gain and unexpected weight loss.   HENT: Positive for congestion, postnasal drip, rhinorrhea and sinus pressure. Negative for nosebleeds, sore throat and trouble swallowing.    Eyes: Negative for visual disturbance.   Respiratory: Negative for cough, shortness of breath and wheezing.    Cardiovascular: Negative for chest pain, palpitations and leg swelling.   Gastrointestinal: Negative for abdominal pain, blood in stool, constipation, diarrhea, nausea and vomiting.   Endocrine: Negative for polydipsia, polyphagia and polyuria.   Genitourinary: Negative for dysuria, frequency and hematuria.   Musculoskeletal: Negative for arthralgias, joint swelling and myalgias.   Skin: Negative for rash.   Neurological: Negative for dizziness, seizures, syncope and numbness.   Hematological: Negative for adenopathy. Does not bruise/bleed easily.   Psychiatric/Behavioral: Negative for behavioral problems, sleep disturbance and depressed mood. The patient is not nervous/anxious.        Objective   Physical Exam   Constitutional: She is oriented to person, place, and time. She appears well-developed and well-nourished. No distress.   HENT:   Head: Normocephalic and atraumatic.   Right Ear: Tympanic membrane and external ear normal.   Left Ear: Tympanic membrane and external ear normal.   Nose: Right sinus exhibits maxillary sinus tenderness. Left sinus exhibits maxillary  sinus tenderness.   Mouth/Throat: Oropharynx is clear and moist. No oropharyngeal exudate.   Eyes: Conjunctivae are normal. Pupils are equal, round, and reactive to light. Right eye exhibits no discharge. Left eye exhibits no discharge. No scleral icterus.   Neck: Neck supple. No tracheal deviation present. No thyromegaly present.   Cardiovascular: Normal rate, regular rhythm and normal heart sounds. Exam reveals no gallop and no friction rub.   No murmur heard.  Pulmonary/Chest: Effort normal and breath sounds normal. No respiratory distress. She has no wheezes.   Abdominal: Soft. Bowel sounds are normal. She exhibits no distension and no mass. There is no tenderness.   Musculoskeletal: She exhibits no edema or deformity.   Lymphadenopathy:     She has no cervical adenopathy.   Neurological: She is alert and oriented to person, place, and time. Coordination normal.   Skin: Skin is warm and dry. Capillary refill takes less than 2 seconds. No rash noted. No erythema.   Psychiatric: She has a normal mood and affect. Her speech is normal and behavior is normal. Judgment and thought content normal.   Nursing note and vitals reviewed.        Assessment/Plan   Riana was seen today for uri.    Diagnoses and all orders for this visit:    Sore throat  -     POC Rapid Strep A  -     amoxicillin (AMOXIL) 500 MG tablet; Take 1 tablet by mouth 2 (Two) Times a Day.    Other acute sinusitis, recurrence not specified  -     amoxicillin (AMOXIL) 500 MG tablet; Take 1 tablet by mouth 2 (Two) Times a Day.  -     Chlorcyclizine-Pseudoephed 25-60 MG tablet; Take 25 mg by mouth 2 (Two) Times a Day.    Yeast vaginitis  -     fluconazole (DIFLUCAN) 150 MG tablet; Take 1 tablet by mouth 1 (One) Time for 1 dose.    Symptomatic treatment.  Follow up as needed.

## 2020-01-14 ENCOUNTER — OFFICE VISIT (OUTPATIENT)
Dept: FAMILY MEDICINE CLINIC | Facility: CLINIC | Age: 30
End: 2020-01-14

## 2020-01-14 VITALS
OXYGEN SATURATION: 98 % | WEIGHT: 209.4 LBS | TEMPERATURE: 98.4 F | SYSTOLIC BLOOD PRESSURE: 122 MMHG | RESPIRATION RATE: 16 BRPM | BODY MASS INDEX: 33.65 KG/M2 | HEIGHT: 66 IN | HEART RATE: 70 BPM | DIASTOLIC BLOOD PRESSURE: 79 MMHG

## 2020-01-14 DIAGNOSIS — Z01.419 WELL WOMAN EXAM: Primary | ICD-10-CM

## 2020-01-14 DIAGNOSIS — L65.9 THINNING HAIR: ICD-10-CM

## 2020-01-14 PROCEDURE — 99213 OFFICE O/P EST LOW 20 MIN: CPT | Performed by: NURSE PRACTITIONER

## 2020-01-14 RX ORDER — OLANZAPINE 2.5 MG/1
2.5 TABLET ORAL NIGHTLY
COMMUNITY
End: 2021-11-08

## 2020-01-14 NOTE — PROGRESS NOTES
Subjective   Riana Anthony is a 29 y.o. female.     History of Present Illness Patient last seen over a year ago. In the interim she had gastric bypass in [place]. Did well after the surgery. No complications. Getting her protein in. Has lost 25 pounds.  Was told she needed to follow up on thyroid levels as they were slightly off when she had surgery. She is now complaining of thinning hair. She is taking vitamins and a biotin supplement.    Outpatient Encounter Medications as of 1/14/2020   Medication Sig Dispense Refill   • Boric Acid 4 % solution boric acid   Three  times a week     • FLUoxetine (PROzac) 20 MG capsule Take 20 mg by mouth Daily.     • OLANZapine (zyPREXA) 2.5 MG tablet Take 2.5 mg by mouth Every Night.     • omeprazole (priLOSEC) 20 MG capsule Take 1 capsule by mouth Daily. 30 capsule 4   • Chlorcyclizine-Pseudoephed 25-60 MG tablet Take 25 mg by mouth 2 (Two) Times a Day. 42 tablet 0   • [DISCONTINUED] amoxicillin (AMOXIL) 500 MG tablet Take 1 tablet by mouth 2 (Two) Times a Day. 20 tablet 0     No facility-administered encounter medications on file as of 1/14/2020.        The following portions of the patient's history were reviewed and updated as appropriate: allergies, current medications, past family history, past medical history, past social history, past surgical history and problem list.    Review of Systems   Constitutional: Negative for appetite change, fever, unexpected weight gain and unexpected weight loss.   HENT: Negative for congestion, nosebleeds, sore throat and trouble swallowing.    Eyes: Negative for visual disturbance.   Respiratory: Negative for cough, shortness of breath and wheezing.    Cardiovascular: Negative for chest pain, palpitations and leg swelling.   Gastrointestinal: Negative for abdominal pain, blood in stool, constipation, diarrhea, nausea and vomiting.   Endocrine: Negative for polydipsia, polyphagia and polyuria.   Genitourinary: Negative for dysuria, frequency  "and hematuria.   Musculoskeletal: Negative for arthralgias, joint swelling and myalgias.   Skin: Negative for rash.        Thinning hair   Neurological: Negative for dizziness, seizures, syncope and numbness.   Hematological: Negative for adenopathy. Does not bruise/bleed easily.   Psychiatric/Behavioral: Negative for behavioral problems, sleep disturbance and depressed mood. The patient is not nervous/anxious.        Objective     Visit Vitals  /79   Pulse 70   Temp 98.4 °F (36.9 °C) (Oral)   Resp 16   Ht 167.6 cm (66\")   Wt 95 kg (209 lb 6.4 oz)   SpO2 98%   BMI 33.80 kg/m²       Physical Exam   Constitutional: She is oriented to person, place, and time. She appears well-developed and well-nourished. No distress.   HENT:   Head: Normocephalic and atraumatic.   Right Ear: Tympanic membrane and external ear normal.   Left Ear: Tympanic membrane and external ear normal.   Nose: Nose normal.   Mouth/Throat: Oropharynx is clear and moist. No oropharyngeal exudate.   Eyes: Pupils are equal, round, and reactive to light. Conjunctivae are normal. Right eye exhibits no discharge. Left eye exhibits no discharge. No scleral icterus.   Neck: Neck supple. No tracheal deviation present. No thyromegaly present.   Cardiovascular: Normal rate, regular rhythm and normal heart sounds. Exam reveals no gallop and no friction rub.   No murmur heard.  Pulmonary/Chest: Effort normal and breath sounds normal. No respiratory distress. She has no wheezes.   Abdominal: Soft. Bowel sounds are normal. She exhibits no distension and no mass. There is no tenderness.   Musculoskeletal: She exhibits no edema or deformity.   Lymphadenopathy:     She has no cervical adenopathy.   Neurological: She is alert and oriented to person, place, and time. Coordination normal.   Skin: Skin is warm and dry. Capillary refill takes less than 2 seconds. No rash noted. No erythema.   Psychiatric: She has a normal mood and affect. Her speech is normal and " behavior is normal. Judgment and thought content normal.   Nursing note and vitals reviewed.        Assessment/Plan   Riana was seen today for thyroid problem.    Diagnoses and all orders for this visit:    Well woman exam  -     Ambulatory Referral to Obstetrics / Gynecology    Thinning hair  -     T4, Free  -     TSH  -     CBC & Differential  -     Comprehensive Metabolic Panel    Check labs today. Caution biotin may interfere with thyroid lab results.

## 2020-01-15 LAB
ALBUMIN SERPL-MCNC: 4.1 G/DL (ref 3.5–5.2)
ALBUMIN/GLOB SERPL: 1.3 G/DL
ALP SERPL-CCNC: 123 U/L (ref 39–117)
ALT SERPL-CCNC: 30 U/L (ref 1–33)
AST SERPL-CCNC: 25 U/L (ref 1–32)
BASOPHILS # BLD AUTO: ABNORMAL 10*3/UL
BILIRUB SERPL-MCNC: 0.3 MG/DL (ref 0.2–1.2)
BUN SERPL-MCNC: 7 MG/DL (ref 6–20)
BUN/CREAT SERPL: 10.1 (ref 7–25)
CALCIUM SERPL-MCNC: 9.2 MG/DL (ref 8.6–10.5)
CHLORIDE SERPL-SCNC: 102 MMOL/L (ref 98–107)
CO2 SERPL-SCNC: 22.8 MMOL/L (ref 22–29)
CREAT SERPL-MCNC: 0.69 MG/DL (ref 0.57–1)
DIFFERENTIAL COMMENT: ABNORMAL
EOSINOPHIL # BLD AUTO: ABNORMAL 10*3/UL
EOSINOPHIL # BLD MANUAL: 0.21 10*3/MM3 (ref 0–0.4)
EOSINOPHIL NFR BLD AUTO: ABNORMAL %
EOSINOPHIL NFR BLD MANUAL: 2.1 % (ref 0.3–6.2)
ERYTHROCYTE [DISTWIDTH] IN BLOOD BY AUTOMATED COUNT: 13.1 % (ref 12.3–15.4)
GLOBULIN SER CALC-MCNC: 3.2 GM/DL
GLUCOSE SERPL-MCNC: 89 MG/DL (ref 65–99)
HCT VFR BLD AUTO: 36.8 % (ref 34–46.6)
HGB BLD-MCNC: 11.9 G/DL (ref 12–15.9)
LYMPHOCYTES # BLD AUTO: ABNORMAL 10*3/UL
LYMPHOCYTES # BLD MANUAL: 4.05 10*3/MM3 (ref 0.7–3.1)
LYMPHOCYTES NFR BLD AUTO: ABNORMAL %
LYMPHOCYTES NFR BLD MANUAL: 40.6 % (ref 19.6–45.3)
MCH RBC QN AUTO: 27.3 PG (ref 26.6–33)
MCHC RBC AUTO-ENTMCNC: 32.3 G/DL (ref 31.5–35.7)
MCV RBC AUTO: 84.4 FL (ref 79–97)
MONOCYTES # BLD MANUAL: 0.21 10*3/MM3 (ref 0.1–0.9)
MONOCYTES NFR BLD AUTO: ABNORMAL %
MONOCYTES NFR BLD MANUAL: 2.1 % (ref 5–12)
NEUTROPHILS # BLD MANUAL: 5.51 10*3/MM3 (ref 1.7–7)
NEUTROPHILS NFR BLD AUTO: ABNORMAL %
NEUTROPHILS NFR BLD MANUAL: 55.2 % (ref 42.7–76)
PLATELET # BLD AUTO: 458 10*3/MM3 (ref 140–450)
PLATELET BLD QL SMEAR: ABNORMAL
POTASSIUM SERPL-SCNC: 4.2 MMOL/L (ref 3.5–5.2)
PROT SERPL-MCNC: 7.3 G/DL (ref 6–8.5)
RBC # BLD AUTO: 4.36 10*6/MM3 (ref 3.77–5.28)
RBC MORPH BLD: ABNORMAL
SODIUM SERPL-SCNC: 138 MMOL/L (ref 136–145)
T4 FREE SERPL-MCNC: 0.99 NG/DL (ref 0.93–1.7)
TSH SERPL DL<=0.005 MIU/L-ACNC: 1.69 UIU/ML (ref 0.27–4.2)
WBC # BLD AUTO: 9.98 10*3/MM3 (ref 3.4–10.8)

## 2020-03-11 ENCOUNTER — TELEPHONE (OUTPATIENT)
Dept: FAMILY MEDICINE CLINIC | Facility: CLINIC | Age: 30
End: 2020-03-11

## 2020-03-11 NOTE — TELEPHONE ENCOUNTER
PTS SPOUSE CALLED AND SAID THE PATIENT NEEDS A REFERRAL FOR A HEARING TEST; PLEASE ADVISE    JUSTINA: [phone]

## 2020-03-11 NOTE — TELEPHONE ENCOUNTER
Patient Needs appointment. I called the spouse and he told me his wife was available now and told me to call [phone]. Called no answer. Lvm for patient.

## 2020-03-12 ENCOUNTER — OFFICE VISIT (OUTPATIENT)
Dept: FAMILY MEDICINE CLINIC | Facility: CLINIC | Age: 30
End: 2020-03-12

## 2020-03-12 VITALS
WEIGHT: 199 LBS | OXYGEN SATURATION: 96 % | HEIGHT: 66 IN | HEART RATE: 70 BPM | BODY MASS INDEX: 31.98 KG/M2 | RESPIRATION RATE: 18 BRPM | DIASTOLIC BLOOD PRESSURE: 60 MMHG | TEMPERATURE: 97.8 F | SYSTOLIC BLOOD PRESSURE: 98 MMHG

## 2020-03-12 DIAGNOSIS — H69.83 DYSFUNCTION OF BOTH EUSTACHIAN TUBES: Primary | ICD-10-CM

## 2020-03-12 PROCEDURE — 99213 OFFICE O/P EST LOW 20 MIN: CPT | Performed by: FAMILY MEDICINE

## 2020-03-12 RX ORDER — GUAIFENESIN, PSEUDOEPHEDRINE HYDROCHLORIDE 600; 60 MG/1; MG/1
1 TABLET, EXTENDED RELEASE ORAL EVERY 12 HOURS
Qty: 60 TABLET | Refills: 0 | OUTPATIENT
Start: 2020-03-12 | End: 2021-07-29

## 2020-03-12 RX ORDER — FLUTICASONE PROPIONATE 50 MCG
SPRAY, SUSPENSION (ML) NASAL
Qty: 1 BOTTLE | Refills: 2 | OUTPATIENT
Start: 2020-03-12 | End: 2021-07-29

## 2020-03-12 NOTE — PROGRESS NOTES
"Subjective   Riana Anthony is a 29 y.o. female seen today for Hearing Loss.     Hearing Problem   This is a chronic problem. The current episode started more than 1 year ago. The problem has been unchanged. Pertinent negatives include no chest pain, congestion, fever or sore throat. Nothing aggravates the symptoms. She has tried nothing for the symptoms.        The following portions of the patient's history were reviewed and updated as appropriate: allergies, current medications, past social history and problem list.    Review of Systems   Constitutional: Negative for fever.   HENT: Negative for congestion and sore throat.    Cardiovascular: Negative for chest pain.       Objective   BP 98/60   Pulse 70   Temp 97.8 °F (36.6 °C)   Resp 18   Ht 167.6 cm (66\")   Wt 90.3 kg (199 lb)   SpO2 96%   BMI 32.12 kg/m²   Physical Exam   Constitutional: She appears well-developed and well-nourished.   HENT:   Right Ear: External ear normal.   Left Ear: External ear normal.   Nose: Nose normal.   Mouth/Throat: Oropharynx is clear and moist.   Nursing note and vitals reviewed.      Assessment/Plan   Problem List Items Addressed This Visit     None      Visit Diagnoses     Dysfunction of both eustachian tubes    -  Primary    Relevant Medications    fluticasone (FLONASE) 50 MCG/ACT nasal spray    pseudoephedrine-guaifenesin (MUCINEX D)  MG per 12 hr tablet              Drink plenty fluids.    Rx for Fluticasone nasal spray 2 sprays in each nostril daily.#1+5.  Rx for Mucinex D twice a day #60+0.    Follow up in 3 weeks.              Scribed for Dr Jose Hart by Catherine Nathan CMA.          I, Jose Hart MD, personally performed the services described in this documentation, as scribed by Catherine Nathan in my presence, and is both accurate and complete.        (Please note that portions of this note were completed with a voice recognition program. Efforts were made to edit the dictations,but occasionally words are " mis transcribed.)

## 2020-05-28 ENCOUNTER — OFFICE VISIT (OUTPATIENT)
Dept: FAMILY MEDICINE CLINIC | Facility: CLINIC | Age: 30
End: 2020-05-28

## 2020-05-28 VITALS
OXYGEN SATURATION: 99 % | WEIGHT: 208 LBS | RESPIRATION RATE: 18 BRPM | HEART RATE: 77 BPM | DIASTOLIC BLOOD PRESSURE: 62 MMHG | TEMPERATURE: 98.6 F | HEIGHT: 66 IN | BODY MASS INDEX: 33.43 KG/M2 | SYSTOLIC BLOOD PRESSURE: 106 MMHG

## 2020-05-28 DIAGNOSIS — H91.93 DECREASED HEARING OF BOTH EARS: Primary | ICD-10-CM

## 2020-05-28 PROCEDURE — 99213 OFFICE O/P EST LOW 20 MIN: CPT | Performed by: FAMILY MEDICINE

## 2020-05-28 NOTE — PROGRESS NOTES
"Subjective   Riana Anthony is a 29 y.o. female seen today for Follow-up (hearing loss).     Hearing Problem   This is a recurrent problem. The current episode started more than 1 year ago. Pertinent negatives include no chest pain, chills, coughing or fever. Nothing aggravates the symptoms. Treatments tried: Flonase and Mucinex D. The treatment provided no relief.        The following portions of the patient's history were reviewed and updated as appropriate: allergies, current medications, past social history and problem list.    Review of Systems   Constitutional: Negative for chills and fever.   HENT: Positive for ear discharge and hearing loss. Negative for ear pain.    Respiratory: Negative for cough.    Cardiovascular: Negative for chest pain.       Objective   /62   Pulse 77   Temp 98.6 °F (37 °C)   Resp 18   Ht 167.6 cm (66\")   Wt 94.3 kg (208 lb)   SpO2 99%   BMI 33.57 kg/m²   Physical Exam   Constitutional: She appears well-developed and well-nourished.   HENT:   Right Ear: External ear normal.   Left Ear: External ear normal.   Mouth/Throat: Oropharynx is clear and moist.   Nursing note and vitals reviewed.      Assessment/Plan   Problem List Items Addressed This Visit     None      Visit Diagnoses     Decreased hearing of both ears    -  Primary        Bilateral reduction in hearing.  There is a family history of hearing loss.  I will refer to ear nose and throat for audiologic evaluation.  Her eardrums appear normal today.     "

## 2020-08-27 ENCOUNTER — TELEPHONE (OUTPATIENT)
Dept: FAMILY MEDICINE CLINIC | Facility: CLINIC | Age: 30
End: 2020-08-27

## 2020-08-27 NOTE — TELEPHONE ENCOUNTER
Patient called and asked if she could be prescribed weight loss medication.  Patient stated that she doesn't want to come in and be told that the medication cannot be prescribed to her.    Patient callback: [phone]    Please advise

## 2020-08-28 ENCOUNTER — TELEPHONE (OUTPATIENT)
Dept: FAMILY MEDICINE CLINIC | Facility: CLINIC | Age: 30
End: 2020-08-28

## 2020-08-28 DIAGNOSIS — E66.9 OBESITY (BMI 30-39.9): Primary | ICD-10-CM

## 2020-08-28 NOTE — TELEPHONE ENCOUNTER
----- Message from Gricelda Car sent at 8/28/2020  9:29 AM EDT -----  I spoke with the patient and relayed the medication info, she would like to proceed with referral to bariatrics

## 2021-06-15 ENCOUNTER — OFFICE VISIT (OUTPATIENT)
Dept: FAMILY MEDICINE CLINIC | Facility: CLINIC | Age: 31
End: 2021-06-15

## 2021-06-15 VITALS
WEIGHT: 226.6 LBS | BODY MASS INDEX: 36.42 KG/M2 | RESPIRATION RATE: 17 BRPM | HEIGHT: 66 IN | SYSTOLIC BLOOD PRESSURE: 116 MMHG | TEMPERATURE: 97.7 F | DIASTOLIC BLOOD PRESSURE: 42 MMHG | OXYGEN SATURATION: 99 % | HEART RATE: 73 BPM

## 2021-06-15 DIAGNOSIS — K21.9 GASTROESOPHAGEAL REFLUX DISEASE, UNSPECIFIED WHETHER ESOPHAGITIS PRESENT: ICD-10-CM

## 2021-06-15 DIAGNOSIS — R14.0 GENERALIZED BLOATING: ICD-10-CM

## 2021-06-15 DIAGNOSIS — R12 HEARTBURN: ICD-10-CM

## 2021-06-15 DIAGNOSIS — K42.9 UMBILICAL HERNIA WITHOUT OBSTRUCTION AND WITHOUT GANGRENE: Primary | ICD-10-CM

## 2021-06-15 PROCEDURE — 99213 OFFICE O/P EST LOW 20 MIN: CPT | Performed by: NURSE PRACTITIONER

## 2021-06-15 RX ORDER — OMEPRAZOLE 40 MG/1
40 CAPSULE, DELAYED RELEASE ORAL DAILY
Qty: 30 CAPSULE | Refills: 5 | Status: SHIPPED | OUTPATIENT
Start: 2021-06-15 | End: 2022-03-08 | Stop reason: DRUGHIGH

## 2021-06-15 RX ORDER — SUCRALFATE ORAL 1 G/10ML
1 SUSPENSION ORAL 4 TIMES DAILY
Qty: 160 EACH | Refills: 0 | OUTPATIENT
Start: 2021-06-15 | End: 2021-07-29

## 2021-06-15 NOTE — PATIENT INSTRUCTIONS
Hernia, Adult         A hernia happens when tissue inside your body pushes out through a weak spot in your belly muscles (abdominal wall). This makes a round lump (bulge). The lump may be:  · In a scar from surgery that was done in your belly (incisional hernia).  · Near your belly button (umbilical hernia).  · In your groin (inguinal hernia). Your groin is the area where your leg meets your lower belly (abdomen). This kind of hernia could also be:  ? In your scrotum, if you are male.  ? In folds of skin around your vagina, if you are female.  · In your upper thigh (femoral hernia).  · Inside your belly (hiatal hernia). This happens when your stomach slides above the muscle between your belly and your chest (diaphragm).  If your hernia is small and it does not cause pain, you may not need treatment. If your hernia is large or it causes pain, you may need surgery.  Follow these instructions at home:  Activity  · Avoid stretching or overusing (straining) the muscles near your hernia. Straining can happen when you:  ? Lift something heavy.  ? Poop (have a bowel movement).  · Do not lift anything that is heavier than 10 lb (4.5 kg), or the limit that you are told, until your doctor says that it is safe.  · Use the strength of your legs when you lift something heavy. Do not use only your back muscles to lift.  General instructions  · Do these things if told by your doctor so you do not have trouble pooping (constipation):  ? Drink enough fluid to keep your pee (urine) pale yellow.  ? Eat foods that are high in fiber. These include fresh fruits and vegetables, whole grains, and beans.  ? Limit foods that are high in fat and processed sugars. These include foods that are fried or sweet.  ? Take medicine for trouble pooping.  · When you cough, try to cough gently.  · You may try to push your hernia in by very gently pressing on it when you are lying down. Do not try to force the bulge back in if it will not push in  easily.  · If you are overweight, work with your doctor to lose weight safely.  · Do not use any products that have nicotine or tobacco in them. These include cigarettes and e-cigarettes. If you need help quitting, ask your doctor.  · If you will be having surgery (hernia repair), watch your hernia for changes in shape, size, or color. Tell your doctor if you see any changes.  · Take over-the-counter and prescription medicines only as told by your doctor.  · Keep all follow-up visits as told by your doctor.  Contact a doctor if:  · You get new pain, swelling, or redness near your hernia.  · You poop fewer times in a week than normal.  · You have trouble pooping.  · You have poop (stool) that is more dry than normal.  · You have poop that is harder or larger than normal.  Get help right away if:  · You have a fever.  · You have belly pain that gets worse.  · You feel sick to your stomach (nauseous).  · You throw up (vomit).  · Your hernia cannot be pushed in by very gently pressing on it when you are lying down. Do not try to force the bulge back in if it will not push in easily.  · Your hernia:  ? Changes in shape or size.  ? Changes color.  ? Feels hard or it hurts when you touch it.  These symptoms may represent a serious problem that is an emergency. Do not wait to see if the symptoms will go away. Get medical help right away. Call your local emergency services (911 in the [place]  Summary  · A hernia happens when tissue inside your body pushes out through a weak spot in the belly muscles. This creates a bulge.  · If your hernia is small and it does not hurt, you may not need treatment. If your hernia is large or it hurts, you may need surgery.  · If you will be having surgery, watch your hernia for changes in shape, size, or color. Tell your doctor about any changes.  This information is not intended to replace advice given to you by your health care provider. Make sure you discuss any questions you have with  your health care provider.  Document Revised: 04/09/2020 Document Reviewed: 09/19/2018  "[employer] Patient Education © 2021 "[employer] Inc.    Nausea, Adult  Nausea is feeling sick to your stomach or feeling that you are about to throw up (vomit). Feeling sick to your stomach is usually not serious, but it may be an early sign of a more serious medical problem.  As you feel sicker to your stomach, you may throw up. If you throw up, or if you are not able to drink enough fluids, there is a risk that you may lose too much water in your body (get dehydrated). If you lose too much water in your body, you may:  · Feel tired.  · Feel thirsty.  · Have a dry mouth.  · Have cracked lips.  · Go pee (urinate) less often.  Older adults and people who have other diseases or a weak body defense system (immune system) have a higher risk of losing too much water in the body.  The main goals of treating this condition are:  · To relieve your nausea.  · To ensure your nausea occurs less often.  · To prevent throwing up and losing too much fluid.  Follow these instructions at home:  Watch your symptoms for any changes. Tell your doctor about them. Follow these instructions as told by your doctor.  Eating and drinking         · Take an ORS (oral rehydration solution). This is a drink that is sold at pharmacies and stores.  · Drink clear fluids in small amounts as you are able. These include:  ? Water.  ? Ice chips.  ? Fruit juice that has water added (diluted fruit juice).  ? Low-calorie sports drinks.  · Eat bland, easy-to-digest foods in small amounts as you are able, such as:  ? Bananas.  ? Applesauce.  ? Rice.  ? Low-fat (lean) meats.  ? Toast.  ? Crackers.  · Avoid drinking fluids that have a lot of sugar or caffeine in them. This includes energy drinks, sports drinks, and soda.  · Avoid alcohol.  · Avoid spicy or fatty foods.  General instructions  · Take over-the-counter and prescription medicines only as told by your  doctor.  · Rest at home while you get better.  · Drink enough fluid to keep your pee (urine) pale yellow.  · Take slow and deep breaths when you feel sick to your stomach.  · Avoid food or things that have strong smells.  · Wash your hands often with soap and water. If you cannot use soap and water, use hand .  · Make sure that all people in your home wash their hands well and often.  · Keep all follow-up visits as told by your doctor. This is important.  Contact a doctor if:  · You feel sicker to your stomach.  · You feel sick to your stomach for more than 2 days.  · You throw up.  · You are not able to drink fluids without throwing up.  · You have new symptoms.  · You have a fever.  · You have a headache.  · You have muscle cramps.  · You have a rash.  · You have pain while peeing.  · You feel light-headed or dizzy.  Get help right away if:  · You have pain in your chest, neck, arm, or jaw.  · You feel very weak or you pass out (faint).  · You have throw up that is bright red or looks like coffee grounds.  · You have bloody or black poop (stools) or poop that looks like tar.  · You have a very bad headache, a stiff neck, or both.  · You have very bad pain, cramping, or bloating in your belly (abdomen).  · You have trouble breathing or you are breathing very quickly.  · Your heart is beating very quickly.  · Your skin feels cold and clammy.  · You feel confused.  · You have signs of losing too much water in your body, such as:  ? Dark pee, very little pee, or no pee.  ? Cracked lips.  ? Dry mouth.  ? Sunken eyes.  ? Sleepiness.  ? Weakness.  These symptoms may be an emergency. Do not wait to see if the symptoms will go away. Get medical help right away. Call your local emergency services (911 in the [place] Do not drive yourself to the hospital.  Summary  · Nausea is feeling sick to your stomach or feeling that you are about to throw up (vomit).  · If you throw up, or if you are not able to drink enough  fluids, there is a risk that you may lose too much water in your body (get dehydrated).  · Eat and drink what your doctor tells you. Take over-the-counter and prescription medicines only as told by your doctor.  · Contact a doctor right away if your symptoms get worse or you have new symptoms.  · Keep all follow-up visits as told by your doctor. This is important.  This information is not intended to replace advice given to you by your health care provider. Make sure you discuss any questions you have with your health care provider.  Document Revised: 11/17/2020 Document Reviewed: 05/28/2019  [employer] Patient Education © 2021 [employer]    Gastroesophageal Reflux Disease, Adult  Gastroesophageal reflux (OZZY) happens when acid from the stomach flows up into the tube that connects the mouth and the stomach (esophagus). Normally, food travels down the esophagus and stays in the stomach to be digested. With OZZY, food and stomach acid sometimes move back up into the esophagus. You may have a disease called gastroesophageal reflux disease (GERD) if the reflux:  · Happens often.  · Causes frequent or very bad symptoms.  · Causes problems such as damage to the esophagus.  When this happens, the esophagus becomes sore and swollen (inflamed). Over time, GERD can make small holes (ulcers) in the lining of the esophagus.  What are the causes?  This condition is caused by a problem with the muscle between the esophagus and the stomach. When this muscle is weak or not normal, it does not close properly to keep food and acid from coming back up from the stomach. The muscle can be weak because of:  · Tobacco use.  · Pregnancy.  · Having a certain type of hernia (hiatal hernia).  · Alcohol use.  · Certain foods and drinks, such as coffee, chocolate, onions, and peppermint.  What increases the risk?  You are more likely to develop this condition if you:  · Are overweight.  · Have a disease that affects your connective  tissue.  · Use NSAID medicines.  What are the signs or symptoms?  Symptoms of this condition include:  · Heartburn.  · Difficult or painful swallowing.  · The feeling of having a lump in the throat.  · A bitter taste in the mouth.  · Bad breath.  · Having a lot of saliva.  · Having an upset or bloated stomach.  · Belching.  · Chest pain. Different conditions can cause chest pain. Make sure you see your doctor if you have chest pain.  · Shortness of breath or noisy breathing (wheezing).  · Ongoing (chronic) cough or a cough at night.  · Wearing away of the surface of teeth (tooth enamel).  · Weight loss.  How is this treated?  Treatment will depend on how bad your symptoms are. Your doctor may suggest:  · Changes to your diet.  · Medicine.  · Surgery.  Follow these instructions at home:  Eating and drinking    · Follow a diet as told by your doctor. You may need to avoid foods and drinks such as:  ? Coffee and tea (with or without caffeine).  ? Drinks that contain alcohol.  ? Energy drinks and sports drinks.  ? Bubbly (carbonated) drinks or sodas.  ? Chocolate and cocoa.  ? Peppermint and mint flavorings.  ? Garlic and onions.  ? Horseradish.  ? Spicy and acidic foods. These include peppers, chili powder, akhtar powder, vinegar, hot sauces, and BBQ sauce.  ? Citrus fruit juices and citrus fruits, such as oranges, janet, and limes.  ? Tomato-based foods. These include red sauce, chili, salsa, and pizza with red sauce.  ? Fried and fatty foods. These include donuts, french fries, potato chips, and high-fat dressings.  ? High-fat meats. These include hot dogs, rib eye steak, sausage, ham, and larsen.  ? High-fat dairy items, such as whole milk, butter, and cream cheese.  · Eat small meals often. Avoid eating large meals.  · Avoid drinking large amounts of liquid with your meals.  · Avoid eating meals during the 2-3 hours before bedtime.  · Avoid lying down right after you eat.  · Do not exercise right after you  eat.  Lifestyle    · Do not use any products that contain nicotine or tobacco. These include cigarettes, e-cigarettes, and chewing tobacco. If you need help quitting, ask your doctor.  · Try to lower your stress. If you need help doing this, ask your doctor.  · If you are overweight, lose an amount of weight that is healthy for you. Ask your doctor about a safe weight loss goal.  General instructions  · Pay attention to any changes in your symptoms.  · Take over-the-counter and prescription medicines only as told by your doctor. Do not take aspirin, ibuprofen, or other NSAIDs unless your doctor says it is okay.  · Wear loose clothes. Do not wear anything tight around your waist.  · Raise (elevate) the head of your bed about 6 inches (15 cm).  · Avoid bending over if this makes your symptoms worse.  · Keep all follow-up visits as told by your doctor. This is important.  Contact a doctor if:  · You have new symptoms.  · You lose weight and you do not know why.  · You have trouble swallowing or it hurts to swallow.  · You have wheezing or a cough that keeps happening.  · Your symptoms do not get better with treatment.  · You have a hoarse voice.  Get help right away if:  · You have pain in your arms, neck, jaw, teeth, or back.  · You feel sweaty, dizzy, or light-headed.  · You have chest pain or shortness of breath.  · You throw up (vomit) and your throw-up looks like blood or coffee grounds.  · You pass out (faint).  · Your poop (stool) is bloody or black.  · You cannot swallow, drink, or eat.  Summary  · If a person has gastroesophageal reflux disease (GERD), food and stomach acid move back up into the esophagus and cause symptoms or problems such as damage to the esophagus.  · Treatment will depend on how bad your symptoms are.  · Follow a diet as told by your doctor.  · Take all medicines only as told by your doctor.  This information is not intended to replace advice given to you by your health care provider. Make  sure you discuss any questions you have with your health care provider.  Document Revised: 06/26/2019 Document Reviewed: 06/26/2019  [employer] Patient Education © 2021 [employer]

## 2021-06-15 NOTE — PROGRESS NOTES
"Chief Complaint  Abdominal Pain (possible hernea )    Subjective          Riana Anthony presents to [facility]  Patient is a 32 yo female. She is here for Nausea, aching and occasionally sharp pain. She has noticed a lump above her belly button about 2 months ago. Hx of Gastric sleeve 2019. No current pain. Hx of heartburn and GERD. It seems to be getting worse.       Heartburn  She complains of abdominal pain and nausea. She reports no belching, no chest pain, no choking, no coughing, no dysphagia, no early satiety, no globus sensation, no heartburn, no hoarse voice, no sore throat, no stridor, no tooth decay, no water brash or no wheezing. This is a recurrent problem. The current episode started more than 1 month ago. The heartburn is located in the substernum. The heartburn is of moderate intensity. The heartburn wakes her from sleep. The heartburn does not limit her activity. The heartburn doesn't change with position. The symptoms are aggravated by certain foods and caffeine. Pertinent negatives include no anemia, fatigue, melena, muscle weakness, orthopnea or weight loss. Risk factors include caffeine use. She has tried a PPI for the symptoms. The treatment provided mild relief. Past procedures do not include an abdominal ultrasound, an EGD, esophageal manometry, esophageal pH monitoring, H. pylori antibody titer or a UGI. gastric sleeve .       Objective   Vital Signs:   /42   Pulse 73   Temp 97.7 °F (36.5 °C) (Infrared)   Resp 17   Ht 167.6 cm (65.98\")   Wt 103 kg (226 lb 9.6 oz)   SpO2 99%   BMI 36.59 kg/m²     Physical Exam  Constitutional:       Appearance: Normal appearance. She is well-developed and well-groomed. She is obese.   Cardiovascular:      Rate and Rhythm: Normal rate.      Heart sounds: Normal heart sounds, S1 normal and S2 normal.   Pulmonary:      Effort: Pulmonary effort is normal.      Breath sounds: Normal breath sounds.   Abdominal:      " General: Bowel sounds are normal.      Palpations: Abdomen is soft.      Tenderness: There is abdominal tenderness in the periumbilical area. There is no right CVA tenderness or left CVA tenderness.      Hernia: A hernia is present. Hernia is present in the umbilical area.       Skin:     General: Skin is warm.      Capillary Refill: Capillary refill takes less than 2 seconds.   Neurological:      Mental Status: She is alert.      GCS: GCS eye subscore is 4. GCS verbal subscore is 5. GCS motor subscore is 6.   Psychiatric:         Attention and Perception: Attention normal.         Mood and Affect: Mood normal.         Speech: Speech normal.         Behavior: Behavior is cooperative.         Cognition and Memory: Cognition and memory normal.        Result Review :                 Assessment and Plan    Diagnoses and all orders for this visit:    1. Umbilical hernia without obstruction and without gangrene (Primary)  -     Ambulatory Referral to General Surgery  -     US Abdomen Complete; Future    2. Generalized bloating  -     omeprazole (priLOSEC) 40 MG capsule; Take 1 capsule by mouth Daily. Take 30 minutes 1st meal of the day  Dispense: 30 capsule; Refill: 5    3. Heartburn  -     omeprazole (priLOSEC) 40 MG capsule; Take 1 capsule by mouth Daily. Take 30 minutes 1st meal of the day  Dispense: 30 capsule; Refill: 5  -     sucralfate (Carafate) 1 GM/10ML suspension; Take 10 mL by mouth 4 (Four) Times a Day.  Dispense: 160 each; Refill: 0    4. Gastroesophageal reflux disease, unspecified whether esophagitis present  -     omeprazole (priLOSEC) 40 MG capsule; Take 1 capsule by mouth Daily. Take 30 minutes 1st meal of the day  Dispense: 30 capsule; Refill: 5  -     sucralfate (Carafate) 1 GM/10ML suspension; Take 10 mL by mouth 4 (Four) Times a Day.  Dispense: 160 each; Refill: 0    referral to general surgery for evaluation.   US of abdomen.   Start carafate. Continue prilosec.      Follow up in 2 weeks and as  needed.       Follow Up   Return in about 4 weeks (around 7/13/2021), or GERD, for Recheck.  Patient was given instructions and counseling regarding her condition or for health maintenance advice. Please see specific information pulled into the AVS if appropriate.

## 2021-06-18 ENCOUNTER — TELEPHONE (OUTPATIENT)
Dept: FAMILY MEDICINE CLINIC | Facility: CLINIC | Age: 31
End: 2021-06-18

## 2021-06-18 NOTE — TELEPHONE ENCOUNTER
PT CALLED STATED THAT RX  sucralfate (Carafate) 1 GM/10ML suspension HAS BEEN HELPING HER STOMACH A LOT, SHE IS ALMOST OUT OF THE RX AND REQUEST REFILL. ALSO PT IS TRYING TO GET PREGNANT AND WOULD LIKE TO KNOW IF RX WOULD EFFECT HER TRYING TO GET PREGNANT.      PLEASE ADVISE.  CALL BACK:[phone]    [facility] [address] AT [facility]

## 2021-06-21 PROBLEM — K43.9 SUPRAUMBILICAL HERNIA: Status: ACTIVE | Noted: 2021-06-21

## 2021-06-28 ENCOUNTER — HOSPITAL ENCOUNTER (OUTPATIENT)
Dept: ULTRASOUND IMAGING | Facility: HOSPITAL | Age: 31
End: 2021-06-28

## 2021-06-29 ENCOUNTER — TELEPHONE (OUTPATIENT)
Dept: FAMILY MEDICINE CLINIC | Facility: CLINIC | Age: 31
End: 2021-06-29

## 2021-06-29 DIAGNOSIS — Z98.84 S/P LAPAROSCOPIC SLEEVE GASTRECTOMY: ICD-10-CM

## 2021-06-29 DIAGNOSIS — K21.9 GASTROESOPHAGEAL REFLUX DISEASE, UNSPECIFIED WHETHER ESOPHAGITIS PRESENT: Primary | ICD-10-CM

## 2021-06-29 DIAGNOSIS — K42.9 UMBILICAL HERNIA WITHOUT OBSTRUCTION AND WITHOUT GANGRENE: ICD-10-CM

## 2021-06-29 DIAGNOSIS — R14.0 GENERALIZED BLOATING: ICD-10-CM

## 2021-06-29 NOTE — TELEPHONE ENCOUNTER
Caller: Riana Anthony    Relationship: Self    Best call back number: [phone]    What is the medical concern/diagnosis: PATIENT STATES SHE HAD A GASTRIC SLEEVE PERFORMED IN [place] IN 2019 AND SHE IS NOW HAVING TERRIBLE PAIN DUE TO THE SURGERY.    What specialty or service is being requested: BARIATRIC DOCTOR     What is the provider, practice or medical service name:     What is the office location:     What is the office phone number:     Any additional details: PATIENT IS REQUESTING TO SEE A BARIATRIC DOCTOR WHO CAN SEE HER AS SOON AS POSSIBLE AND CAN EVALUATE HER PREVIOUS SURGERY TO FIND OUT WHAT THE CAUSE OF HER PAIN IS. PATIENT STATES SHE ALMOST WENT TO THE EMERGENCY ROOM YESTERDAY 6/28/21 DUE TO TERRIBLE PAIN FROM HER ABDOMINAL AREA.

## 2021-07-21 ENCOUNTER — TELEPHONE (OUTPATIENT)
Dept: FAMILY MEDICINE CLINIC | Facility: CLINIC | Age: 31
End: 2021-07-21

## 2021-07-28 LAB
ALBUMIN SERPL-MCNC: 4 G/DL (ref 3.5–5.2)
ALBUMIN/GLOB SERPL: 1.1 G/DL
ALP SERPL-CCNC: 148 U/L (ref 39–117)
ALT SERPL W P-5'-P-CCNC: 62 U/L (ref 1–33)
ANION GAP SERPL CALCULATED.3IONS-SCNC: 12 MMOL/L (ref 5–15)
AST SERPL-CCNC: 58 U/L (ref 1–32)
BASOPHILS # BLD AUTO: 0.09 10*3/MM3 (ref 0–0.2)
BASOPHILS NFR BLD AUTO: 0.7 % (ref 0–1.5)
BILIRUB SERPL-MCNC: 0.2 MG/DL (ref 0–1.2)
BUN SERPL-MCNC: 11 MG/DL (ref 6–20)
BUN/CREAT SERPL: 14.9 (ref 7–25)
CALCIUM SPEC-SCNC: 9.6 MG/DL (ref 8.6–10.5)
CHLORIDE SERPL-SCNC: 106 MMOL/L (ref 98–107)
CO2 SERPL-SCNC: 24 MMOL/L (ref 22–29)
CREAT SERPL-MCNC: 0.74 MG/DL (ref 0.57–1)
DEPRECATED RDW RBC AUTO: 47.2 FL (ref 37–54)
EOSINOPHIL # BLD AUTO: 0.31 10*3/MM3 (ref 0–0.4)
EOSINOPHIL NFR BLD AUTO: 2.6 % (ref 0.3–6.2)
ERYTHROCYTE [DISTWIDTH] IN BLOOD BY AUTOMATED COUNT: 16 % (ref 12.3–15.4)
GFR SERPL CREATININE-BSD FRML MDRD: 92 ML/MIN/1.73
GLOBULIN UR ELPH-MCNC: 3.6 GM/DL
GLUCOSE SERPL-MCNC: 102 MG/DL (ref 65–99)
HCT VFR BLD AUTO: 37.5 % (ref 34–46.6)
HGB BLD-MCNC: 11.9 G/DL (ref 12–15.9)
HOLD SPECIMEN: NORMAL
HOLD SPECIMEN: NORMAL
IMM GRANULOCYTES # BLD AUTO: 0.03 10*3/MM3 (ref 0–0.05)
IMM GRANULOCYTES NFR BLD AUTO: 0.2 % (ref 0–0.5)
LIPASE SERPL-CCNC: 18 U/L (ref 13–60)
LYMPHOCYTES # BLD AUTO: 4.37 10*3/MM3 (ref 0.7–3.1)
LYMPHOCYTES NFR BLD AUTO: 36 % (ref 19.6–45.3)
MCH RBC QN AUTO: 25.7 PG (ref 26.6–33)
MCHC RBC AUTO-ENTMCNC: 31.7 G/DL (ref 31.5–35.7)
MCV RBC AUTO: 81 FL (ref 79–97)
MONOCYTES # BLD AUTO: 0.6 10*3/MM3 (ref 0.1–0.9)
MONOCYTES NFR BLD AUTO: 4.9 % (ref 5–12)
NEUTROPHILS NFR BLD AUTO: 55.6 % (ref 42.7–76)
NEUTROPHILS NFR BLD AUTO: 6.73 10*3/MM3 (ref 1.7–7)
NRBC BLD AUTO-RTO: 0 /100 WBC (ref 0–0.2)
PLATELET # BLD AUTO: 503 10*3/MM3 (ref 140–450)
PMV BLD AUTO: 9.7 FL (ref 6–12)
POTASSIUM SERPL-SCNC: 4.6 MMOL/L (ref 3.5–5.2)
PROT SERPL-MCNC: 7.6 G/DL (ref 6–8.5)
RBC # BLD AUTO: 4.63 10*6/MM3 (ref 3.77–5.28)
SODIUM SERPL-SCNC: 142 MMOL/L (ref 136–145)
WBC # BLD AUTO: 12.13 10*3/MM3 (ref 3.4–10.8)
WHOLE BLOOD HOLD SPECIMEN: NORMAL

## 2021-07-28 PROCEDURE — 83690 ASSAY OF LIPASE: CPT

## 2021-07-28 PROCEDURE — 96374 THER/PROPH/DIAG INJ IV PUSH: CPT

## 2021-07-28 PROCEDURE — 85025 COMPLETE CBC W/AUTO DIFF WBC: CPT

## 2021-07-28 PROCEDURE — 81025 URINE PREGNANCY TEST: CPT

## 2021-07-28 PROCEDURE — 80053 COMPREHEN METABOLIC PANEL: CPT

## 2021-07-28 PROCEDURE — 99283 EMERGENCY DEPT VISIT LOW MDM: CPT

## 2021-07-28 RX ORDER — SODIUM CHLORIDE 9 MG/ML
10 INJECTION INTRAVENOUS AS NEEDED
Status: DISCONTINUED | OUTPATIENT
Start: 2021-07-28 | End: 2021-07-29 | Stop reason: HOSPADM

## 2021-07-29 ENCOUNTER — TELEPHONE (OUTPATIENT)
Dept: GASTROENTEROLOGY | Facility: CLINIC | Age: 31
End: 2021-07-29

## 2021-07-29 ENCOUNTER — APPOINTMENT (OUTPATIENT)
Dept: CT IMAGING | Facility: HOSPITAL | Age: 31
End: 2021-07-29

## 2021-07-29 ENCOUNTER — HOSPITAL ENCOUNTER (EMERGENCY)
Facility: HOSPITAL | Age: 31
Discharge: HOME | End: 2021-07-29
Attending: EMERGENCY MEDICINE | Admitting: EMERGENCY MEDICINE

## 2021-07-29 ENCOUNTER — TELEPHONE (OUTPATIENT)
Dept: BARIATRICS/WEIGHT MGMT | Facility: CLINIC | Age: 31
End: 2021-07-29

## 2021-07-29 VITALS
TEMPERATURE: 97.1 F | HEART RATE: 69 BPM | RESPIRATION RATE: 16 BRPM | HEIGHT: 66 IN | DIASTOLIC BLOOD PRESSURE: 66 MMHG | WEIGHT: 215 LBS | OXYGEN SATURATION: 98 % | BODY MASS INDEX: 34.55 KG/M2 | SYSTOLIC BLOOD PRESSURE: 118 MMHG

## 2021-07-29 DIAGNOSIS — R10.30 LOWER ABDOMINAL PAIN: ICD-10-CM

## 2021-07-29 DIAGNOSIS — K42.9 UMBILICAL HERNIA WITHOUT OBSTRUCTION AND WITHOUT GANGRENE: ICD-10-CM

## 2021-07-29 DIAGNOSIS — R11.2 NON-INTRACTABLE VOMITING WITH NAUSEA, UNSPECIFIED VOMITING TYPE: Primary | ICD-10-CM

## 2021-07-29 DIAGNOSIS — Z98.84 H/O GASTRIC BYPASS: ICD-10-CM

## 2021-07-29 LAB
B-HCG UR QL: NEGATIVE
BACTERIA UR QL AUTO: ABNORMAL /HPF
BILIRUB UR QL STRIP: NEGATIVE
CLARITY UR: ABNORMAL
COLOR UR: YELLOW
GLUCOSE UR STRIP-MCNC: NEGATIVE MG/DL
HGB UR QL STRIP.AUTO: ABNORMAL
HOLD SPECIMEN: NORMAL
HYALINE CASTS UR QL AUTO: ABNORMAL /LPF
INTERNAL NEGATIVE CONTROL: NEGATIVE
INTERNAL POSITIVE CONTROL: POSITIVE
KETONES UR QL STRIP: ABNORMAL
LEUKOCYTE ESTERASE UR QL STRIP.AUTO: NEGATIVE
Lab: NORMAL
NITRITE UR QL STRIP: NEGATIVE
PH UR STRIP.AUTO: 6.5 [PH] (ref 5–8)
PROT UR QL STRIP: ABNORMAL
RBC # UR: ABNORMAL /HPF
REF LAB TEST METHOD: ABNORMAL
SP GR UR STRIP: 1.03 (ref 1–1.03)
SQUAMOUS #/AREA URNS HPF: ABNORMAL /HPF
UROBILINOGEN UR QL STRIP: ABNORMAL
WBC UR QL AUTO: ABNORMAL /HPF

## 2021-07-29 PROCEDURE — 74177 CT ABD & PELVIS W/CONTRAST: CPT

## 2021-07-29 PROCEDURE — 25010000002 ONDANSETRON PER 1 MG: Performed by: EMERGENCY MEDICINE

## 2021-07-29 PROCEDURE — 96374 THER/PROPH/DIAG INJ IV PUSH: CPT

## 2021-07-29 PROCEDURE — 25010000002 IOPAMIDOL 61 % SOLUTION: Performed by: EMERGENCY MEDICINE

## 2021-07-29 PROCEDURE — 81001 URINALYSIS AUTO W/SCOPE: CPT

## 2021-07-29 RX ORDER — SODIUM CHLORIDE 0.9 % (FLUSH) 0.9 %
10 SYRINGE (ML) INJECTION AS NEEDED
Status: DISCONTINUED | OUTPATIENT
Start: 2021-07-29 | End: 2021-07-29 | Stop reason: HOSPADM

## 2021-07-29 RX ORDER — ONDANSETRON 4 MG/1
4 TABLET, ORALLY DISINTEGRATING ORAL 4 TIMES DAILY PRN
Qty: 15 TABLET | Refills: 0 | Status: SHIPPED | OUTPATIENT
Start: 2021-07-29 | End: 2021-08-05

## 2021-07-29 RX ORDER — ONDANSETRON 2 MG/ML
4 INJECTION INTRAMUSCULAR; INTRAVENOUS
Status: DISCONTINUED | OUTPATIENT
Start: 2021-07-29 | End: 2021-07-29 | Stop reason: HOSPADM

## 2021-07-29 RX ADMIN — IOPAMIDOL 100 ML: 612 INJECTION, SOLUTION INTRAVENOUS at 02:57

## 2021-07-29 RX ADMIN — ONDANSETRON 4 MG: 2 INJECTION INTRAMUSCULAR; INTRAVENOUS at 02:50

## 2021-07-29 RX ADMIN — SODIUM CHLORIDE 1000 ML: 9 INJECTION, SOLUTION INTRAVENOUS at 02:49

## 2021-07-29 NOTE — TELEPHONE ENCOUNTER
Patient was seen in the ED and states she was told she would need to have a scope.  Please review and advise.

## 2021-08-04 ENCOUNTER — HOSPITAL ENCOUNTER (OUTPATIENT)
Dept: ULTRASOUND IMAGING | Facility: HOSPITAL | Age: 31
Discharge: HOME | End: 2021-08-04
Admitting: NURSE PRACTITIONER

## 2021-08-04 DIAGNOSIS — K42.9 UMBILICAL HERNIA WITHOUT OBSTRUCTION AND WITHOUT GANGRENE: ICD-10-CM

## 2021-08-04 PROCEDURE — 76705 ECHO EXAM OF ABDOMEN: CPT

## 2021-08-05 ENCOUNTER — OFFICE VISIT (OUTPATIENT)
Dept: FAMILY MEDICINE CLINIC | Facility: CLINIC | Age: 31
End: 2021-08-05

## 2021-08-05 VITALS
HEIGHT: 66 IN | WEIGHT: 230 LBS | HEART RATE: 87 BPM | DIASTOLIC BLOOD PRESSURE: 60 MMHG | SYSTOLIC BLOOD PRESSURE: 120 MMHG | OXYGEN SATURATION: 98 % | BODY MASS INDEX: 36.96 KG/M2 | TEMPERATURE: 97 F

## 2021-08-05 DIAGNOSIS — K80.20 CALCULUS OF GALLBLADDER WITHOUT CHOLECYSTITIS WITHOUT OBSTRUCTION: Primary | ICD-10-CM

## 2021-08-05 PROCEDURE — 99213 OFFICE O/P EST LOW 20 MIN: CPT | Performed by: FAMILY MEDICINE

## 2021-08-05 RX ORDER — URSODIOL 500 MG/1
500 TABLET, FILM COATED ORAL 2 TIMES DAILY
Qty: 60 TABLET | Refills: 11 | Status: SHIPPED | OUTPATIENT
Start: 2021-08-05 | End: 2021-08-18

## 2021-08-05 NOTE — PROGRESS NOTES
Riana Anthony is a 31 y.o. female who presents today for Hospital Follow Up Visit      32 yo female presents for an emergency department follow up. She presented to [facility] ED on 7/29/21 for periumbilical abdominal pain, nausea and vomiting. She has a history of gastric bypass surgery 2 years ago and the country of [place]. She states she was given zofran at the ED but she has felt better since her encounter and had not needed to use it. She states she only has abdominal pain when she eats spicy food and onions. The ED found a small hernia and stones in her gallbladder. She states she was referred to general surgery for her hernia. Her appointment was in July but the surgeon states she should not have the hernia repaired until she is finished having kids. Patient would like to have several more children. She states she will get it repaired when she knows she is finished having children. She states the ED did not give her a referral or medication for her gallbladder. She is here today to talk about medication options and diet modifications for her gallbladder. She does not want to have it removed until she has worsening symptoms. She states her appetite is normal and she has been eating and drinking well. She denies fever, chest pain, shortness of breath, nausea and vomiting. She denies any bowel complaints.       Review of Systems   Constitutional: Negative for activity change, appetite change, chills, diaphoresis, fatigue, fever, unexpected weight gain and unexpected weight loss.   HENT: Negative for dental problem and ear pain.    Eyes: Negative for blurred vision.   Respiratory: Negative for cough, chest tightness and shortness of breath.    Cardiovascular: Negative for chest pain, palpitations and leg swelling.   Gastrointestinal: Positive for abdominal pain (when she eats spicy food or onions). Negative for abdominal distention, blood in stool, constipation, diarrhea, nausea, vomiting and indigestion.  "  Genitourinary: Negative for dysuria.   Skin: Negative for rash.   Neurological: Negative for dizziness, syncope and confusion.   Psychiatric/Behavioral: Negative for sleep disturbance.        The following portions of the patient's history were reviewed and updated as appropriate: allergies, current medications, past family history, past medical history, past social history, past surgical history and problem list.    Current Outpatient Medications on File Prior to Visit   Medication Sig Dispense Refill   • FLUoxetine (PROzac) 20 MG capsule Take 40 mg by mouth 2 (Two) Times a Day.     • Multiple Vitamins-Minerals (MULTIVITAMIN GUMMIES ADULT PO) Adult Multivitamin Gummies     • OLANZapine (zyPREXA) 2.5 MG tablet Take 2.5 mg by mouth Every Night.     • omeprazole (priLOSEC) 40 MG capsule Take 1 capsule by mouth Daily. Take 30 minutes 1st meal of the day (Patient taking differently: Take 40 mg by mouth Daily. Take 30 minutes 1st meal of the day. Pt states she sometimes takes 3 a day) 30 capsule 5   • [DISCONTINUED] ondansetron ODT (ZOFRAN-ODT) 4 MG disintegrating tablet Place 1 tablet on the tongue 4 (Four) Times a Day As Needed for Nausea or Vomiting. 15 tablet 0     No current facility-administered medications on file prior to visit.       No Known Allergies     Visit Vitals  /60   Pulse 87   Temp 97 °F (36.1 °C)   Ht 167.6 cm (66\")   Wt 104 kg (230 lb)   LMP 07/28/2021   SpO2 98%   BMI 37.12 kg/m²        Physical Exam  Constitutional:       General: She is not in acute distress.     Appearance: Normal appearance. She is not ill-appearing, toxic-appearing or diaphoretic.   HENT:      Head: Normocephalic.      Nose: Nose normal.      Mouth/Throat:      Mouth: Mucous membranes are moist.      Pharynx: Oropharynx is clear.   Eyes:      Pupils: Pupils are equal, round, and reactive to light.   Cardiovascular:      Rate and Rhythm: Normal rate and regular rhythm.      Pulses: Normal pulses.      Heart sounds: " Normal heart sounds. No murmur heard.   No friction rub. No gallop.    Pulmonary:      Effort: Pulmonary effort is normal. No respiratory distress.      Breath sounds: Normal breath sounds. No stridor. No wheezing, rhonchi or rales.   Chest:      Chest wall: No tenderness.   Abdominal:      General: Abdomen is flat. Bowel sounds are normal. There is no distension.      Palpations: Abdomen is soft. There is no mass.      Tenderness: There is no abdominal tenderness. There is no guarding or rebound.      Hernia: No hernia is present.   Musculoskeletal:         General: Normal range of motion.   Skin:     General: Skin is warm and dry.      Coloration: Skin is not jaundiced.      Findings: No erythema.   Neurological:      General: No focal deficit present.      Mental Status: She is alert and oriented to person, place, and time. Mental status is at baseline.   Psychiatric:         Mood and Affect: Mood normal.         Thought Content: Thought content normal.               Problems Addressed this Visit        Gastrointestinal Abdominal     Calculus of gallbladder without cholecystitis without obstruction - Primary     Patient is wanting to avoid surgery for gallbladder removal if possible due to having hernia develop after gastric bypass surgery.  She is interested in medical management of gallstones.  We discussed risk benefit of medical treatment versus surgical treatment.  Patient understands risk and would like to start ursodiol.  She would also like referral to surgery to discuss gallbladder removal.  Orders were placed for both.  RTC/ED precautions given.         Relevant Medications    ursodiol (ACTIGALL) 500 MG tablet    Other Relevant Orders    Ambulatory Referral to General Surgery      Diagnoses       Codes Comments    Calculus of gallbladder without cholecystitis without obstruction    -  Primary ICD-10-CM: K80.20  ICD-9-CM: 574.20           Return in about 3 months (around 11/5/2021) for Follow-up  gallstones.    Parts of this office note have been dictated by voice recognition software. Grammatical and/or spelling errors may be present. I attest that I have reviewed the student note and that the components of the history of the present illness, the physical exam, and the assessment and plan documented were performed by me or were performed in my presence by the student and verified by me.      Germán Montana MD   8/5/2021

## 2021-08-05 NOTE — ASSESSMENT & PLAN NOTE
Patient is wanting to avoid surgery for gallbladder removal if possible due to having hernia develop after gastric bypass surgery.  She is interested in medical management of gallstones.  We discussed risk benefit of medical treatment versus surgical treatment.  Patient understands risk and would like to start ursodiol.  She would also like referral to surgery to discuss gallbladder removal.  Orders were placed for both.  RTC/ED precautions given.

## 2021-08-10 ENCOUNTER — TELEPHONE (OUTPATIENT)
Dept: FAMILY MEDICINE CLINIC | Facility: CLINIC | Age: 31
End: 2021-08-10

## 2021-08-18 ENCOUNTER — OFFICE VISIT (OUTPATIENT)
Dept: BARIATRICS/WEIGHT MGMT | Facility: CLINIC | Age: 31
End: 2021-08-18

## 2021-08-18 VITALS
HEIGHT: 66 IN | DIASTOLIC BLOOD PRESSURE: 68 MMHG | RESPIRATION RATE: 18 BRPM | OXYGEN SATURATION: 98 % | BODY MASS INDEX: 36.8 KG/M2 | HEART RATE: 81 BPM | SYSTOLIC BLOOD PRESSURE: 124 MMHG | WEIGHT: 229 LBS | TEMPERATURE: 97.1 F

## 2021-08-18 DIAGNOSIS — E55.9 HYPOVITAMINOSIS D: ICD-10-CM

## 2021-08-18 DIAGNOSIS — Z90.3 POSTGASTRECTOMY MALABSORPTION: ICD-10-CM

## 2021-08-18 DIAGNOSIS — K80.20 CALCULUS OF GALLBLADDER WITHOUT CHOLECYSTITIS WITHOUT OBSTRUCTION: ICD-10-CM

## 2021-08-18 DIAGNOSIS — K21.9 GASTROESOPHAGEAL REFLUX DISEASE, UNSPECIFIED WHETHER ESOPHAGITIS PRESENT: ICD-10-CM

## 2021-08-18 DIAGNOSIS — Z13.0 SCREENING, IRON DEFICIENCY ANEMIA: ICD-10-CM

## 2021-08-18 DIAGNOSIS — Z13.21 MALNUTRITION SCREEN: ICD-10-CM

## 2021-08-18 DIAGNOSIS — R53.83 FATIGUE, UNSPECIFIED TYPE: ICD-10-CM

## 2021-08-18 DIAGNOSIS — E66.9 OBESITY, CLASS II, BMI 35-39.9: Primary | ICD-10-CM

## 2021-08-18 DIAGNOSIS — K91.2 POSTGASTRECTOMY MALABSORPTION: ICD-10-CM

## 2021-08-18 PROCEDURE — 99204 OFFICE O/P NEW MOD 45 MIN: CPT | Performed by: SURGERY

## 2021-08-18 RX ORDER — SUCRALFATE 1 G/1
1 TABLET ORAL 3 TIMES DAILY
COMMUNITY
Start: 2021-08-11 | End: 2021-10-19

## 2021-08-18 NOTE — PROGRESS NOTES
"[facility]  [address]  [address]  [facility] [id number] [phone]      Patient  Name:  Riana Anthony  :  1990      Date of Visit: 2021      Chief Complaint:  KOFI    History of Present Illness:  Riana Anthony is a 31 y.o. female who presents today for KOFI.    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  Has been on Zyprexa for 2+ years.  She reports she was addicted to weight-loss medications.  Has always been anxious about her weight.    Weight before surgery was 220.  She lost 20 pounds only and regained.  When she had sleeve, she was instructed to \"eat healthy, focus on proteins.\"  [place] the [place] at the time she went to [place] to have the surgery, and only saw her surgeon once after surgery.  I reviewed sparse op note: 38 [place] b[place] laparoscopic sleeve gastrectomy.  Per patient no complications.    Now c/o GERD that is new since surgery.  She takes Prilosec 40 mg 2-3x per day, PRN.  She got a doctor online from an appt to give her carafate pills, which have helped.    C/o epigastric abdominal pain that is worse when she vapes.  \"I'm addicted to nicotine.\"  Has had to cut back on vaping b/c of the pain.  Also periumbilical pain.    21 ED visit at [facility] for epigastric pain/vomiting.    CT reviewed:  IMPRESSION:     1. Cholelithiasis without biliary obstruction.  2. Gastric sleeve surgery. GI tract is otherwise normal including the appendix.  3. Small fat-containing ventral wall hernia 5 cm above the umbilicus.  4. Otherwise negative.    They recommended f/u with bariatric surgeon and also referred her to general surgeon re: gallstones.    Had miscarriage in January.  Has 3 boys.  Trying to get pregnant again.    All past medical, surgical, social and family history have been obtained and discussed as pertinent to bariatric surgery as below.     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  Follows with psychiatry, on Zyprexa and Prozac.     • Umbilical hernia      Past Surgical History:   Procedure Laterality Date   • GASTRIC SLEEVE LAPAROSCOPIC  20.  [address] [place].  op note rviewed       No Known Allergies    Current Outpatient Medications:   •  FLUoxetine (PROzac) 20 MG capsule, Take 40 mg by mouth 2 (Two) Times a Day., Disp: , Rfl:   •  Multiple Vitamins-Minerals (MULTIVITAMIN GUMMIES ADULT PO), Adult Multivitamin Gummies, Disp: , Rfl:   •  OLANZapine (zyPREXA) 2.5 MG tablet, Take 2.5 mg by mouth Every Night., Disp: , Rfl:   •  omeprazole (priLOSEC) 40 MG capsule, Take 1 capsule by mouth Daily. Take 30 minutes 1st meal of the day (Patient taking differently: Take 40 mg by mouth Daily. Take 30 minutes 1st meal of the day. Pt states she sometimes takes 3 a day), Disp: 30 capsule, Rfl: 5  •  sucralfate (CARAFATE) 1 g tablet, Take 1 g by mouth 3 (Three) Times a Day., Disp: , Rfl:     Social History     Socioeconomic History   • Marital status:      Spouse name: Not on file   • Number of children: Not on file   • Years of education: Not on file   • Highest education level: Not on file   Tobacco Use   • Smoking status: Never Smoker   • Smokeless tobacco: Never Used   Vaping Use   • Vaping Use: Every day   • Substances: Nicotine   • Devices: Disposable (pt no longer vapes 21)   • Passive vaping exposure Yes   Substance and Sexual Activity   • Alcohol use: No   • Drug use: No   • Sexual activity: Yes     Partners: Male     Family History   Problem Relation Age of Onset   • No Known Problems Mother    • No Known Problems Father    • No Known Problems Sister    • No Known Problems Brother    • No Known Problems Maternal Grandmother    • No Known  Problems Maternal Grandfather    • No Known Problems Paternal Grandmother    • No Known Problems Paternal Grandfather        Review of Systems   Gastrointestinal: Positive for abdominal pain, nausea and GERD.   All other systems reviewed and are negative.       Physical Exam:  Vital Signs:  Weight: 104 kg (229 lb)   Body mass index is 36.96 kg/m².  Temp: 97.1 °F (36.2 °C)   Heart Rate: 81   BP: 124/68     Physical Exam  Vitals reviewed.   Constitutional:       Appearance: She is well-developed.   HENT:      Head: Normocephalic and atraumatic.      Nose: Nose normal.   Eyes:      Conjunctiva/sclera: Conjunctivae normal.      Pupils: Pupils are equal, round, and reactive to light.   Neck:      Thyroid: No thyromegaly.      Vascular: No carotid bruit.      Trachea: No tracheal deviation.   Cardiovascular:      Rate and Rhythm: Normal rate and regular rhythm.      Heart sounds: Normal heart sounds.   Pulmonary:      Effort: Pulmonary effort is normal. No respiratory distress.      Breath sounds: Normal breath sounds.   Abdominal:      General: There is no distension.      Palpations: Abdomen is soft.      Tenderness: There is no abdominal tenderness.      Comments: Lap scars.  Small fat-containing reducible ventral incisional hernia about a handsbreadth superior to the umbilical in the midline   Musculoskeletal:         General: No deformity. Normal range of motion.      Cervical back: Normal range of motion and neck supple.   Skin:     General: Skin is warm and dry.      Findings: No rash.   Neurological:      Mental Status: She is alert and oriented to person, place, and time.      Cranial Nerves: No cranial nerve deficit.      Coordination: Coordination normal.   Psychiatric:         Behavior: Behavior normal.         Thought Content: Thought content normal.         Judgment: Judgment normal.         Patient Active Problem List   Diagnosis   • Abnormal weight gain   • Gastroesophageal reflux disease   •  Supraumbilical hernia   • Herpetic vulvovaginitis   • Calculus of gallbladder without cholecystitis without obstruction       Assessment:    Riana Anthony is a 31 y.o. year old female with hx of sleeve in [place].  Presents for KOFI.  C/o GERD, gallstones, tiny ventral incisional hernia.      Plan:      Check full panel bariatric labs.    In future, would benefit from consultation with dietician once some other issues are addressed.  Medical weight loss is an option, but has hx of addition to Hydroxycut, so would not be a great candidate for amphetamines and would need different drug therapy if indicated.    Pt counseled to speak to her psychiatrist re: something other than Zyprexa that has less weight gain side effect for her hydroxycut induced psychosis.    Plans a future pregnancy: would probably not be a good time to do definitive/mesh repair of small ventral incisional (port site) hernia.    Re: GERD--UGI, then EGD.  Ddx includes HH vs. Gastric stricture.    Re: cholelithiasis--can address after workup for GERD.  Could do concomitant cholecystectomy if needed.      F/u in 2 months to discuss workup.        Amrita Roche MD

## 2021-08-19 ENCOUNTER — LAB (OUTPATIENT)
Dept: FAMILY MEDICINE CLINIC | Facility: CLINIC | Age: 31
End: 2021-08-19

## 2021-08-19 DIAGNOSIS — R69 SICK: Primary | ICD-10-CM

## 2021-08-19 DIAGNOSIS — R69 SICK: ICD-10-CM

## 2021-08-19 PROCEDURE — U0003 INFECTIOUS AGENT DETECTION BY NUCLEIC ACID (DNA OR RNA); SEVERE ACUTE RESPIRATORY SYNDROME CORONAVIRUS 2 (SARS-COV-2) (CORONAVIRUS DISEASE [COVID-19]), AMPLIFIED PROBE TECHNIQUE, MAKING USE OF HIGH THROUGHPUT TECHNOLOGIES AS DESCRIBED BY CMS-2020-01-R: HCPCS | Performed by: FAMILY MEDICINE

## 2021-08-20 ENCOUNTER — TELEPHONE (OUTPATIENT)
Dept: FAMILY MEDICINE CLINIC | Facility: CLINIC | Age: 31
End: 2021-08-20

## 2021-08-20 NOTE — TELEPHONE ENCOUNTER
Called patient to let her know results were not back and that she will get a mychart notification and a call from us as soon as they are back. Patient verbalized understanding

## 2021-08-20 NOTE — TELEPHONE ENCOUNTER
Caller: Riana Anthony    Relationship: Self    Best call back number: [phone]    Caller requesting test results: SELF    What test was performed: COVID19    When was the test performed: 08/19    Where was the test performed: IN OFFICE    Additional notes: CALLING IN TO CHECK ON RESULTS

## 2021-08-21 LAB
25(OH)D3+25(OH)D2 SERPL-MCNC: 22.3 NG/ML (ref 30–100)
ALBUMIN SERPL-MCNC: 4.1 G/DL (ref 3.5–5.2)
ALBUMIN/GLOB SERPL: 1.3 G/DL
ALP SERPL-CCNC: 160 U/L (ref 39–117)
ALT SERPL-CCNC: 43 U/L (ref 1–33)
AST SERPL-CCNC: 36 U/L (ref 1–32)
BASOPHILS # BLD AUTO: 0.08 10*3/MM3 (ref 0–0.2)
BASOPHILS NFR BLD AUTO: 0.6 % (ref 0–1.5)
BILIRUB SERPL-MCNC: <0.2 MG/DL (ref 0–1.2)
BUN SERPL-MCNC: 13 MG/DL (ref 6–20)
BUN/CREAT SERPL: 16.5 (ref 7–25)
CALCIUM SERPL-MCNC: 9.4 MG/DL (ref 8.6–10.5)
CHLORIDE SERPL-SCNC: 104 MMOL/L (ref 98–107)
CO2 SERPL-SCNC: 23.1 MMOL/L (ref 22–29)
CREAT SERPL-MCNC: 0.79 MG/DL (ref 0.57–1)
EOSINOPHIL # BLD AUTO: 0.49 10*3/MM3 (ref 0–0.4)
EOSINOPHIL NFR BLD AUTO: 3.9 % (ref 0.3–6.2)
ERYTHROCYTE [DISTWIDTH] IN BLOOD BY AUTOMATED COUNT: 16.2 % (ref 12.3–15.4)
FERRITIN SERPL-MCNC: 10.6 NG/ML (ref 13–150)
FOLATE SERPL-MCNC: 13.4 NG/ML (ref 4.78–24.2)
GLOBULIN SER CALC-MCNC: 3.1 GM/DL
GLUCOSE SERPL-MCNC: 92 MG/DL (ref 65–99)
HCT VFR BLD AUTO: 38.1 % (ref 34–46.6)
HGB BLD-MCNC: 11.8 G/DL (ref 12–15.9)
IMM GRANULOCYTES # BLD AUTO: 0.06 10*3/MM3 (ref 0–0.05)
IMM GRANULOCYTES NFR BLD AUTO: 0.5 % (ref 0–0.5)
IRON SERPL-MCNC: 24 MCG/DL (ref 37–145)
LYMPHOCYTES # BLD AUTO: 4.06 10*3/MM3 (ref 0.7–3.1)
LYMPHOCYTES NFR BLD AUTO: 32.4 % (ref 19.6–45.3)
Lab: NORMAL
MCH RBC QN AUTO: 25.3 PG (ref 26.6–33)
MCHC RBC AUTO-ENTMCNC: 31 G/DL (ref 31.5–35.7)
MCV RBC AUTO: 81.8 FL (ref 79–97)
METHYLMALONATE SERPL-SCNC: 71 NMOL/L (ref 0–378)
MONOCYTES # BLD AUTO: 0.75 10*3/MM3 (ref 0.1–0.9)
MONOCYTES NFR BLD AUTO: 6 % (ref 5–12)
NEUTROPHILS # BLD AUTO: 7.11 10*3/MM3 (ref 1.7–7)
NEUTROPHILS NFR BLD AUTO: 56.6 % (ref 42.7–76)
NRBC BLD AUTO-RTO: 0 /100 WBC (ref 0–0.2)
PLATELET # BLD AUTO: 472 10*3/MM3 (ref 140–450)
POTASSIUM SERPL-SCNC: 4.6 MMOL/L (ref 3.5–5.2)
PREALB SERPL-MCNC: 23 MG/DL (ref 14–35)
PROT SERPL-MCNC: 7.2 G/DL (ref 6–8.5)
RBC # BLD AUTO: 4.66 10*6/MM3 (ref 3.77–5.28)
SODIUM SERPL-SCNC: 139 MMOL/L (ref 136–145)
VIT B1 BLD-SCNC: 139.5 NMOL/L (ref 66.5–200)
WBC # BLD AUTO: 12.55 10*3/MM3 (ref 3.4–10.8)

## 2021-08-22 LAB — SARS-COV-2 RNA RESP QL NAA+PROBE: NOT DETECTED

## 2021-08-27 ENCOUNTER — TELEPHONE (OUTPATIENT)
Dept: BARIATRICS/WEIGHT MGMT | Facility: CLINIC | Age: 31
End: 2021-08-27

## 2021-08-27 NOTE — TELEPHONE ENCOUNTER
If at some point her symptoms are too severe for her to eat properly, we may have to consider   Addressing her gallbladder or doing work up sooner.  But in first trimester, it may be risky to have radiation  like the UGI, and and the risk of sedation for EGD may outweigh the benefit.     Patient notified and verbalized understanding.

## 2021-08-27 NOTE — TELEPHONE ENCOUNTER
Patient wanted to let Dr. Estrella know she just found out she was pregnant.  She is asking if she should just postpone everything until after the baby is born, she is schedule for UGI and the EGD to be scheduled after that.

## 2021-08-29 ENCOUNTER — APPOINTMENT (OUTPATIENT)
Dept: PREADMISSION TESTING | Facility: HOSPITAL | Age: 31
End: 2021-08-29

## 2021-09-02 PROCEDURE — U0004 COV-19 TEST NON-CDC HGH THRU: HCPCS | Performed by: FAMILY MEDICINE

## 2021-09-08 ENCOUNTER — HOSPITAL ENCOUNTER (EMERGENCY)
Facility: HOSPITAL | Age: 31
Discharge: HOME | End: 2021-09-08
Attending: EMERGENCY MEDICINE | Admitting: EMERGENCY MEDICINE

## 2021-09-08 ENCOUNTER — APPOINTMENT (OUTPATIENT)
Dept: CT IMAGING | Facility: HOSPITAL | Age: 31
End: 2021-09-08

## 2021-09-08 VITALS
WEIGHT: 230 LBS | HEART RATE: 105 BPM | TEMPERATURE: 97.8 F | SYSTOLIC BLOOD PRESSURE: 126 MMHG | DIASTOLIC BLOOD PRESSURE: 96 MMHG | OXYGEN SATURATION: 97 % | HEIGHT: 66 IN | BODY MASS INDEX: 36.96 KG/M2 | RESPIRATION RATE: 24 BRPM

## 2021-09-08 DIAGNOSIS — R79.89 ABNORMAL LFTS: ICD-10-CM

## 2021-09-08 DIAGNOSIS — R10.13 ABDOMINAL PAIN, ACUTE, EPIGASTRIC: Primary | ICD-10-CM

## 2021-09-08 LAB
ALBUMIN SERPL-MCNC: 4.3 G/DL (ref 3.5–5.2)
ALBUMIN/GLOB SERPL: 1.1 G/DL
ALP SERPL-CCNC: 196 U/L (ref 39–117)
ALT SERPL W P-5'-P-CCNC: 66 U/L (ref 1–33)
ANION GAP SERPL CALCULATED.3IONS-SCNC: 13 MMOL/L (ref 5–15)
AST SERPL-CCNC: 55 U/L (ref 1–32)
BACTERIA UR QL AUTO: ABNORMAL /HPF
BASOPHILS # BLD AUTO: 0.05 10*3/MM3 (ref 0–0.2)
BASOPHILS NFR BLD AUTO: 0.4 % (ref 0–1.5)
BILIRUB SERPL-MCNC: 0.2 MG/DL (ref 0–1.2)
BILIRUB UR QL STRIP: NEGATIVE
BUN SERPL-MCNC: 9 MG/DL (ref 6–20)
BUN/CREAT SERPL: 10.8 (ref 7–25)
CALCIUM SPEC-SCNC: 9.5 MG/DL (ref 8.6–10.5)
CHLORIDE SERPL-SCNC: 103 MMOL/L (ref 98–107)
CLARITY UR: CLEAR
CO2 SERPL-SCNC: 23 MMOL/L (ref 22–29)
COLOR UR: YELLOW
CREAT SERPL-MCNC: 0.83 MG/DL (ref 0.57–1)
DEPRECATED RDW RBC AUTO: 45.3 FL (ref 37–54)
EOSINOPHIL # BLD AUTO: 0.29 10*3/MM3 (ref 0–0.4)
EOSINOPHIL NFR BLD AUTO: 2.4 % (ref 0.3–6.2)
ERYTHROCYTE [DISTWIDTH] IN BLOOD BY AUTOMATED COUNT: 15.2 % (ref 12.3–15.4)
GFR SERPL CREATININE-BSD FRML MDRD: 81 ML/MIN/1.73
GLOBULIN UR ELPH-MCNC: 3.9 GM/DL
GLUCOSE SERPL-MCNC: 101 MG/DL (ref 65–99)
GLUCOSE UR STRIP-MCNC: NEGATIVE MG/DL
HCG INTACT+B SERPL-ACNC: <0.1 MIU/ML
HCT VFR BLD AUTO: 39.7 % (ref 34–46.6)
HGB BLD-MCNC: 12.5 G/DL (ref 12–15.9)
HGB UR QL STRIP.AUTO: ABNORMAL
HOLD SPECIMEN: NORMAL
HYALINE CASTS UR QL AUTO: ABNORMAL /LPF
IMM GRANULOCYTES # BLD AUTO: 0.07 10*3/MM3 (ref 0–0.05)
IMM GRANULOCYTES NFR BLD AUTO: 0.6 % (ref 0–0.5)
KETONES UR QL STRIP: ABNORMAL
LEUKOCYTE ESTERASE UR QL STRIP.AUTO: NEGATIVE
LIPASE SERPL-CCNC: 25 U/L (ref 13–60)
LYMPHOCYTES # BLD AUTO: 4.99 10*3/MM3 (ref 0.7–3.1)
LYMPHOCYTES NFR BLD AUTO: 41.3 % (ref 19.6–45.3)
MCH RBC QN AUTO: 25.7 PG (ref 26.6–33)
MCHC RBC AUTO-ENTMCNC: 31.5 G/DL (ref 31.5–35.7)
MCV RBC AUTO: 81.5 FL (ref 79–97)
MONOCYTES # BLD AUTO: 0.56 10*3/MM3 (ref 0.1–0.9)
MONOCYTES NFR BLD AUTO: 4.6 % (ref 5–12)
NEUTROPHILS NFR BLD AUTO: 50.7 % (ref 42.7–76)
NEUTROPHILS NFR BLD AUTO: 6.12 10*3/MM3 (ref 1.7–7)
NITRITE UR QL STRIP: NEGATIVE
NRBC BLD AUTO-RTO: 0 /100 WBC (ref 0–0.2)
PH UR STRIP.AUTO: 6.5 [PH] (ref 5–8)
PLAT MORPH BLD: NORMAL
PLATELET # BLD AUTO: 515 10*3/MM3 (ref 140–450)
PMV BLD AUTO: 9.4 FL (ref 6–12)
POTASSIUM SERPL-SCNC: 4.1 MMOL/L (ref 3.5–5.2)
PROT SERPL-MCNC: 8.2 G/DL (ref 6–8.5)
PROT UR QL STRIP: ABNORMAL
RBC # BLD AUTO: 4.87 10*6/MM3 (ref 3.77–5.28)
RBC # UR: ABNORMAL /HPF
RBC MORPH BLD: NORMAL
REF LAB TEST METHOD: ABNORMAL
SODIUM SERPL-SCNC: 139 MMOL/L (ref 136–145)
SP GR UR STRIP: 1.03 (ref 1–1.03)
SQUAMOUS #/AREA URNS HPF: ABNORMAL /HPF
UROBILINOGEN UR QL STRIP: ABNORMAL
WBC # BLD AUTO: 12.08 10*3/MM3 (ref 3.4–10.8)
WBC MORPH BLD: NORMAL
WBC UR QL AUTO: ABNORMAL /HPF
WHOLE BLOOD HOLD SPECIMEN: NORMAL
WHOLE BLOOD HOLD SPECIMEN: NORMAL

## 2021-09-08 PROCEDURE — 85007 BL SMEAR W/DIFF WBC COUNT: CPT

## 2021-09-08 PROCEDURE — 25010000002 IOPAMIDOL 61 % SOLUTION: Performed by: EMERGENCY MEDICINE

## 2021-09-08 PROCEDURE — 80053 COMPREHEN METABOLIC PANEL: CPT

## 2021-09-08 PROCEDURE — 85025 COMPLETE CBC W/AUTO DIFF WBC: CPT

## 2021-09-08 PROCEDURE — 83690 ASSAY OF LIPASE: CPT | Performed by: EMERGENCY MEDICINE

## 2021-09-08 PROCEDURE — 84702 CHORIONIC GONADOTROPIN TEST: CPT | Performed by: EMERGENCY MEDICINE

## 2021-09-08 PROCEDURE — 81001 URINALYSIS AUTO W/SCOPE: CPT | Performed by: EMERGENCY MEDICINE

## 2021-09-08 PROCEDURE — 74177 CT ABD & PELVIS W/CONTRAST: CPT

## 2021-09-08 PROCEDURE — 99283 EMERGENCY DEPT VISIT LOW MDM: CPT

## 2021-09-08 RX ORDER — PANTOPRAZOLE SODIUM 40 MG/1
40 TABLET, DELAYED RELEASE ORAL DAILY
Qty: 30 TABLET | Refills: 0 | Status: SHIPPED | OUTPATIENT
Start: 2021-09-08 | End: 2021-10-08

## 2021-09-08 RX ADMIN — IOPAMIDOL 95 ML: 612 INJECTION, SOLUTION INTRAVENOUS at 22:37

## 2021-09-09 ENCOUNTER — TELEPHONE (OUTPATIENT)
Dept: BARIATRICS/WEIGHT MGMT | Facility: CLINIC | Age: 31
End: 2021-09-09

## 2021-09-09 ENCOUNTER — PREP FOR SURGERY (OUTPATIENT)
Dept: OTHER | Facility: HOSPITAL | Age: 31
End: 2021-09-09

## 2021-09-09 DIAGNOSIS — K21.9 GASTROESOPHAGEAL REFLUX DISEASE, UNSPECIFIED WHETHER ESOPHAGITIS PRESENT: Primary | ICD-10-CM

## 2021-09-09 RX ORDER — SODIUM CHLORIDE 9 MG/ML
150 INJECTION, SOLUTION INTRAVENOUS CONTINUOUS
Status: CANCELLED | OUTPATIENT
Start: 2021-09-09

## 2021-09-09 RX ORDER — SODIUM CHLORIDE 0.9 % (FLUSH) 0.9 %
3 SYRINGE (ML) INJECTION EVERY 12 HOURS SCHEDULED
Status: CANCELLED | OUTPATIENT
Start: 2021-09-09

## 2021-09-09 RX ORDER — SODIUM CHLORIDE 0.9 % (FLUSH) 0.9 %
3-10 SYRINGE (ML) INJECTION AS NEEDED
Status: CANCELLED | OUTPATIENT
Start: 2021-09-09

## 2021-09-09 NOTE — ED PROVIDER NOTES
[place]    EMERGENCY DEPARTMENT ENCOUNTER      Pt Name: Riana Anthony  MRN: [id number]  YOB: 1990  Date of evaluation: 2021  Provider: Jamaal Pederson MD    CHIEF COMPLAINT       Chief Complaint   Patient presents with   • Abdominal Pain         HISTORY OF PRESENT ILLNESS  (Location/Symptom, Timing/Onset, Context/Setting, Quality, Duration, Modifying Factors, Severity.)   Riana Anthony is a 31 y.o. female who presents to the emergency department with 6 to 7 months of intermittent burning periumbilical abdominal pain that seems to be worsened with meals and is moderate in severity.  She denies any previous abdominal surgeries apart from past gastric sleeve.  She has not had any vomiting but has had some mild nausea.  No diarrhea, fever, chills, urinary symptoms, or vaginal discharge.      Nursing notes were reviewed.    REVIEW OF SYSTEMS    (2-9 systems for level 4, 10 or more for level 5)   ROS:  General:  No fevers, no chills, no weakness  Cardiovascular:  No chest pain, no palpitations  Respiratory:  No shortness of breath, no cough, no wheezing  Gastrointestinal: Abdominal pain, nausea  Musculoskeletal:  No muscle pain, no joint pain  Skin:  No rash  Neurologic:  No speech problems, no headache, no extremity numbness, no extremity tingling, no extremity weakness  Psychiatric:  No anxiety  Genitourinary:  No dysuria, no hematuria    Except as noted above the remainder of the review of systems was reviewed and negative.       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  Follows with psychiatry, on Zyprexa and Prozac.     • Umbilical hernia          SURGICAL HISTORY       Past Surgical History:   Procedure Laterality Date   • GASTRIC SLEEVE  LAPAROSCOPIC  2019    7/14/19.  38 [address].  op note rviewed         CURRENT MEDICATIONS     No current facility-administered medications for this encounter.    Current Outpatient Medications:   •  FLUoxetine (PROzac) 20 MG capsule, Take 40 mg by mouth 2 (Two) Times a Day., Disp: , Rfl:   •  Multiple Vitamins-Minerals (MULTIVITAMIN GUMMIES ADULT PO), Adult Multivitamin Gummies, Disp: , Rfl:   •  OLANZapine (zyPREXA) 2.5 MG tablet, Take 2.5 mg by mouth Every Night., Disp: , Rfl:   •  omeprazole (priLOSEC) 40 MG capsule, Take 1 capsule by mouth Daily. Take 30 minutes 1st meal of the day (Patient taking differently: Take 40 mg by mouth Daily. Take 30 minutes 1st meal of the day. Pt states she sometimes takes 3 a day), Disp: 30 capsule, Rfl: 5  •  pantoprazole (PROTONIX) 40 MG EC tablet, Take 1 tablet by mouth Daily for 30 days., Disp: 30 tablet, Rfl: 0  •  sucralfate (CARAFATE) 1 g tablet, Take 1 g by mouth 3 (Three) Times a Day., Disp: , Rfl:     ALLERGIES     Patient has no known allergies.    FAMILY HISTORY       Family History   Problem Relation Age of Onset   • No Known Problems Mother    • No Known Problems Father    • No Known Problems Sister    • No Known Problems Brother    • No Known Problems Maternal Grandmother    • No Known Problems Maternal Grandfather    • No Known Problems Paternal Grandmother    • No Known Problems Paternal Grandfather           SOCIAL HISTORY       Social History     Socioeconomic History   • Marital status:      Spouse name: Not on file   • Number of children: Not on file   • Years of education: Not on file   • Highest education level: Not on file   Tobacco Use   • Smoking status: Never Smoker   • Smokeless tobacco: Never Used   Vaping Use   • Vaping Use: Every day   • Substances: Nicotine   • Devices: Disposable (pt no longer vapes 7/25/21)   • Passive vaping exposure Yes   Substance and Sexual Activity   • Alcohol use: No   • Drug use: No   • Sexual activity: Yes      "Partners: Male         PHYSICAL EXAM    (up to 7 for level 4, 8 or more for level 5)     Vitals:    09/08/21 1905   BP: 126/96   BP Location: Left arm   Patient Position: Sitting   Pulse: 105   Resp: 24   Temp: 97.8 °F (36.6 °C)   TempSrc: Oral   SpO2: 97%   Weight: 104 kg (230 lb)   Height: 167.6 cm (66\")       Physical Exam  General: Awake, alert, no acute distress.  HEENT: Conjunctivae normal.  Neck: Trachea midline.  Cardiac: Heart regular rate, rhythm, no murmurs, rubs, or gallops  Lungs: Lungs are clear to auscultation, there is no wheezing, rhonchi, or rales. There is no use of accessory muscles.  Chest wall: There is no tenderness to palpation over the chest wall or over ribs  Abdomen: Mild epigastric tenderness without any associated distention, rebound, or guarding  Musculoskeletal: No deformity.  Neuro: Alert and oriented x 4.  Dermatology: Skin is warm and dry  Psych: Mentation is grossly normal, cognition is grossly normal. Affect is appropriate.      DIAGNOSTIC RESULTS   RADIOLOGY:   Non-plain film images such as CT, Ultrasound and MRI are read by the radiologist. Plain radiographic images are visualized and preliminarily interpreted by the emergency physician with the below findings:      [x] Radiologist's Report Reviewed:  CT Abdomen Pelvis With Contrast   Final Result   No acute abdominal or pelvic pathology. No evidence of acute cholecystitis.      Upper midline ventral hernia containing omental fat only.               Signer Name: IVELISSE Pederson MD    Signed: 9/8/2021 10:56 PM    Workstation Name: [facility]     [facility]            ED BEDSIDE ULTRASOUND:   Performed by ED Physician - none    LABS:    I have reviewed and interpreted all of the currently available lab results from this visit (if applicable):  Results for orders placed or performed during the hospital encounter of 09/08/21   Comprehensive Metabolic Panel    Specimen: Blood   Result Value Ref Range    " Glucose 101 (H) 65 - 99 mg/dL    BUN 9 6 - 20 mg/dL    Creatinine 0.83 0.57 - 1.00 mg/dL    Sodium 139 136 - 145 mmol/L    Potassium 4.1 3.5 - 5.2 mmol/L    Chloride 103 98 - 107 mmol/L    CO2 23.0 22.0 - 29.0 mmol/L    Calcium 9.5 8.6 - 10.5 mg/dL    Total Protein 8.2 6.0 - 8.5 g/dL    Albumin 4.30 3.50 - 5.20 g/dL    ALT (SGPT) 66 (H) 1 - 33 U/L    AST (SGOT) 55 (H) 1 - 32 U/L    Alkaline Phosphatase 196 (H) 39 - 117 U/L    Total Bilirubin 0.2 0.0 - 1.2 mg/dL    eGFR Non African Amer 81 >60 mL/min/1.73    Globulin 3.9 gm/dL    A/G Ratio 1.1 g/dL    BUN/Creatinine Ratio 10.8 7.0 - 25.0    Anion Gap 13.0 5.0 - 15.0 mmol/L   CBC Auto Differential    Specimen: Blood   Result Value Ref Range    WBC 12.08 (H) 3.40 - 10.80 10*3/mm3    RBC 4.87 3.77 - 5.28 10*6/mm3    Hemoglobin 12.5 12.0 - 15.9 g/dL    Hematocrit 39.7 34.0 - 46.6 %    MCV 81.5 79.0 - 97.0 fL    MCH 25.7 (L) 26.6 - 33.0 pg    MCHC 31.5 31.5 - 35.7 g/dL    RDW 15.2 12.3 - 15.4 %    RDW-SD 45.3 37.0 - 54.0 fl    MPV 9.4 6.0 - 12.0 fL    Platelets 515 (H) 140 - 450 10*3/mm3    Neutrophil % 50.7 42.7 - 76.0 %    Lymphocyte % 41.3 19.6 - 45.3 %    Monocyte % 4.6 (L) 5.0 - 12.0 %    Eosinophil % 2.4 0.3 - 6.2 %    Basophil % 0.4 0.0 - 1.5 %    Immature Grans % 0.6 (H) 0.0 - 0.5 %    Neutrophils, Absolute 6.12 1.70 - 7.00 10*3/mm3    Lymphocytes, Absolute 4.99 (H) 0.70 - 3.10 10*3/mm3    Monocytes, Absolute 0.56 0.10 - 0.90 10*3/mm3    Eosinophils, Absolute 0.29 0.00 - 0.40 10*3/mm3    Basophils, Absolute 0.05 0.00 - 0.20 10*3/mm3    Immature Grans, Absolute 0.07 (H) 0.00 - 0.05 10*3/mm3    nRBC 0.0 0.0 - 0.2 /100 WBC   Scan Slide    Specimen: Blood   Result Value Ref Range    RBC Morphology Normal Normal    WBC Morphology Normal Normal    Platelet Morphology Normal Normal   hCG, Quantitative, Pregnancy    Specimen: Blood   Result Value Ref Range    HCG Quantitative <0.10 mIU/mL   Urinalysis With Microscopic If Indicated (No Culture) - Urine, Clean Catch     "Specimen: Urine, Clean Catch   Result Value Ref Range    Color, UA Yellow Yellow, Straw    Appearance, UA Clear Clear    pH, UA 6.5 5.0 - 8.0    Specific Gravity, UA 1.033 (H) 1.001 - 1.030    Glucose, UA Negative Negative    Ketones, UA Trace (A) Negative    Bilirubin, UA Negative Negative    Blood, UA Trace (A) Negative    Protein, UA Trace (A) Negative    Leuk Esterase, UA Negative Negative    Nitrite, UA Negative Negative    Urobilinogen, UA 1.0 E.U./dL 0.2 - 1.0 E.U./dL   Lipase    Specimen: Blood   Result Value Ref Range    Lipase 25 13 - 60 U/L   Urinalysis, Microscopic Only - Urine, Clean Catch    Specimen: Urine, Clean Catch   Result Value Ref Range    RBC, UA 7-12 (A) None Seen, 0-2 /HPF    WBC, UA 6-12 (A) None Seen, 0-2 /HPF    Bacteria, UA 1+ (A) None Seen, Trace /HPF    Squamous Epithelial Cells, UA 7-12 (A) None Seen, 0-2 /HPF    Hyaline Casts, UA 7-12 0 - 6 /LPF    Methodology Automated Microscopy    Green Top (Gel)   Result Value Ref Range    Extra Tube Hold for add-ons.    Lavender Top   Result Value Ref Range    Extra Tube hold for add-on    Gold Top - SST   Result Value Ref Range    Extra Tube Hold for add-ons.    Gray Top   Result Value Ref Range    Extra Tube Hold for add-ons.    Light Blue Top   Result Value Ref Range    Extra Tube hold for add-on         All other labs were within normal range or not returned as of this dictation.      EMERGENCY DEPARTMENT COURSE and DIFFERENTIAL DIAGNOSIS/MDM:   Vitals:    Vitals:    09/08/21 1905   BP: 126/96   BP Location: Left arm   Patient Position: Sitting   Pulse: 105   Resp: 24   Temp: 97.8 °F (36.6 °C)   TempSrc: Oral   SpO2: 97%   Weight: 104 kg (230 lb)   Height: 167.6 cm (66\")       ED Course as of Sep 09 0502   Wed Sep 08, 2021   2302 Patient very well-appearing on reevaluation.  The symptoms are chronic and have been going on for quite some time.  I suspect that this may be gastric in etiology.  CT scan demonstrates no evidence of acute biliary " pathology, appendicitis, obstruction, perforation, or other abnormality.  She does appear to have some bacteriuria on her urinalysis, however this was a contaminated sample and she has no associated urinary symptoms and so I do not feel that treatment for UTI is appropriate at this point.  Remainder of laboratory evaluation does not demonstrate any significant leukocytosis, electrolyte derangement, or evidence of hepatic or pancreatic inflammation.  I did speak with the patient about her abnormal LFTs and have counseled her on following up with her primary care provider regarding this.    [NS]      ED Course User Index  [NS] Jamaal Pederson MD         I had a discussion with the patient/family regarding diagnosis, diagnostic results, treatment plan, and medications.  The patient/family indicated understanding of these instructions.  I spent adequate time at the bedside preceding discharge necessary to personally discuss the aftercare instructions, giving patient education, providing explanations of the results of our evaluations/findings, and my decision making to assure that the patient/family understand the plan of care.  Time was allotted to answer questions at that time and throughout the ED course.  Emphasis was placed on timely follow-up after discharge.  I also discussed the potential for the development of an acute emergent condition requiring further evaluation, admission, or even surgical intervention. I discussed that we found nothing during the visit today indicating the need for further workup, admission, or the presence of an unstable medical condition.  I encouraged the patient to return to the emergency department immediately for ANY concerns, worsening, new complaints, or if symptoms persist and unable to seek follow-up in a timely fashion.  The patient/family expressed understanding and agreement with this plan.  The patient will follow-up with their PCP in 1-2 days for reevaluation.          FINAL IMPRESSION      1. Abdominal pain, acute, epigastric    2. Abnormal LFTs          DISPOSITION/PLAN     ED Disposition     ED Disposition Condition Comment    Discharge Stable           PATIENT REFERRED TO:  Alie Messina, APRN  [address]  [address]  [place] [id number]  [phone]    Schedule an appointment as soon as possible for a visit in 2 days      [facility] Emergency Department  [address] [address] [phone]    If symptoms worsen    Tiago Villa MD  [address]  [place] [address]  [phone]    Schedule an appointment as soon as possible for a visit in 2 days        DISCHARGE MEDICATIONS:     Medication List      START taking these medications    pantoprazole 40 MG EC tablet  Commonly known as: PROTONIX  Take 1 tablet by mouth Daily for 30 days.        CHANGE how you take these medications    omeprazole 40 MG capsule  Commonly known as: priLOSEC  Take 1 capsule by mouth Daily. Take 30 minutes 1st meal of the day  What changed: additional instructions        CONTINUE taking these medications    FLUoxetine 20 MG capsule  Commonly known as: PROzac     MULTIVITAMIN GUMMIES ADULT PO     OLANZapine 2.5 MG tablet  Commonly known as: zyPREXA     sucralfate 1 g tablet  Commonly known as: CARAFATE           Where to Get Your Medications      These medications were sent to [facility] - [place], [place] - [address]  - [phone]  - [phone] FX  [address], [facility] [address]    Phone: [phone]   · pantoprazole 40 MG EC tablet             Comment: Please note this report has been produced using speech recognition software.      Jamaal Pederson MD  Attending Emergency Physician               Jamaal Pederson MD  09/09/21 0867

## 2021-09-09 NOTE — TELEPHONE ENCOUNTER
Patient called, she states she had a miscarriage.  She is asking if you can expedite some testing for her.  She was in the ED at [facility] on 9*8*2021 with severe stomach pain.

## 2021-09-09 NOTE — DISCHARGE INSTRUCTIONS
Your labs did demonstrate abnormal elevation in your liver function tests.  Please follow-up with your primary care provider for further evaluation of this.

## 2021-09-15 ENCOUNTER — TELEMEDICINE (OUTPATIENT)
Dept: BARIATRICS/WEIGHT MGMT | Facility: CLINIC | Age: 31
End: 2021-09-15

## 2021-09-15 VITALS
HEART RATE: 70 BPM | DIASTOLIC BLOOD PRESSURE: 68 MMHG | TEMPERATURE: 97.8 F | WEIGHT: 229.5 LBS | HEIGHT: 66 IN | RESPIRATION RATE: 18 BRPM | SYSTOLIC BLOOD PRESSURE: 120 MMHG | OXYGEN SATURATION: 99 % | BODY MASS INDEX: 36.88 KG/M2

## 2021-09-15 DIAGNOSIS — Z98.84 STATUS POST BARIATRIC SURGERY: Primary | ICD-10-CM

## 2021-09-15 PROCEDURE — 99214 OFFICE O/P EST MOD 30 MIN: CPT | Performed by: PHYSICIAN ASSISTANT

## 2021-09-15 NOTE — PROGRESS NOTES
"[facility]ariat[facility]  [address]  [address]  [facility] [address] [phone]        Patient Name:  Riana Anthony.  :  1990        Reason for Visit:   2 years postop, abd pain, OZZY     HPI: Riana Anthony is a 31 y.o. female s/p LSG in [place] 19    KOFI to Dr. Melchor CARLTON 2021, planned UGI and EGD. Future rula possibly.     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  Has been on Zyprexa for 2+ years.  She reports she was addicted to weight-loss medications.  Has always been anxious about her weight.     Weight before surgery was 220.  She lost 20 pounds only and regained.  When she had sleeve, she was instructed to \"eat healthy, focus on proteins.\"  [place] the [place] at the time she went to [place] to have the surgery, and only saw her surgeon once after surgery.  I reviewed sparse op note: 38 [place] bougie laparoscopic sleeve gastrectomy.  Per patient no complications.     Now c/o GERD that is new since surgery.  She takes Prilosec 40 mg 2-3x per day, PRN.  She got a doctor online from an appt to give her carafate pills, which have helped.     C/o epigastric abdominal pain that is worse when she vapes.  \"I'm addicted to nicotine.\"  Has had to cut back on vaping b/c of the pain.  Also periumbilical pain.     21 ED visit at [facility] for epigastric pain/vomiting.    CT reviewed:  IMPRESSION:     1. Cholelithiasis without biliary obstruction.  2. Gastric sleeve surgery. GI tract is otherwise normal including the appendix.  3. Small fat-containing ventral wall hernia 5 cm above the umbilicus.  4. Otherwise negative.     They recommended f/u with bariatric surgeon and also referred her to general surgeon re: gallstones.     Had miscarriage in January.  Has 3 boys.  Trying to get pregnant again.\"    Today, states she is doing pretty good.   Symptoms remain about the same. " "Continues with Mid abd pain daily, intermittent, superior to umbilicus.  \"A lot\" of heartburn uncontrolled with taking PPI 40mg BID, alternates between omeprazole and pantoprazole. .   Nausea is pretty constant. vomits rarely. She has known gallstones from prior CT as above.  Denies dysphagia, pulmonary issues and fevers.  Eating regular meals. Staying hydrated. Taking MVI.  On Pantoprazole, Omeprazole  and carafate infrequently- note realty working. .      Presurgery weight: 220 pounds.  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  Follows with psychiatry, on Zyprexa and Prozac.     • Umbilical hernia      Past Surgical History:   Procedure Laterality Date   • GASTRIC SLEEVE LAPAROSCOPIC  20.  38 [place] Bougie.  op note rviewed     Outpatient Medications Marked as Taking for the 9/15/21 encounter (Telemedicine) with Kiana Winslow PA-C   Medication Sig Dispense Refill   • Multiple Vitamins-Minerals (MULTIVITAMIN GUMMIES ADULT PO) Adult Multivitamin Gummies     • omeprazole (priLOSEC) 40 MG capsule Take 1 capsule by mouth Daily. Take 30 minutes 1st meal of the day (Patient taking differently: Take 40 mg by mouth Daily. Take 30 minutes 1st meal of the day. Pt states she sometimes takes 3 a day) 30 capsule 5   • pantoprazole (PROTONIX) 40 MG EC tablet Take 1 tablet by mouth Daily for 30 days. 30 tablet 0   • sucralfate (CARAFATE) 1 g tablet Take 1 g by mouth 3 (Three) Times a Day.         No Known Allergies    Social History     Socioeconomic History   • Marital status:      Spouse name: Not on file   • Number of children: Not on file   • Years of education: Not on file   • " "Highest education level: Not on file   Tobacco Use   • Smoking status: Never Smoker   • Smokeless tobacco: Never Used   Vaping Use   • Vaping Use: Every day   • Substances: Nicotine   • Devices: Disposable (pt no longer vapes 7/25/21)   • Passive vaping exposure Yes   Substance and Sexual Activity   • Alcohol use: No   • Drug use: No   • Sexual activity: Yes     Partners: Male       /68 (BP Location: Left arm, Patient Position: Sitting, Cuff Size: Large Adult)   Pulse 70   Temp 97.8 °F (36.6 °C) (Temporal)   Resp 18   Ht 167.6 cm (66\")   Wt 104 kg (229 lb 8 oz)   SpO2 99%   BMI 37.04 kg/m²     Physical Exam  Constitutional:       Appearance: She is well-developed. She is obese.   HENT:      Head: Normocephalic and atraumatic.   Cardiovascular:      Rate and Rhythm: Normal rate and regular rhythm.   Pulmonary:      Effort: Pulmonary effort is normal.      Breath sounds: Normal breath sounds.   Abdominal:      General: Bowel sounds are normal.      Palpations: Abdomen is soft.      Comments: Tender to palpation near superior umbilicus scar  Lap scars   Skin:     General: Skin is warm and dry.   Neurological:      Mental Status: She is alert.   Psychiatric:         Mood and Affect: Mood normal.         Behavior: Behavior normal.         Thought Content: Thought content normal.         Judgment: Judgment normal.           Assessment:   s/p LSG in [place] 7/14/19    ICD-10-CM ICD-9-CM   1. Status post bariatric surgery  Z98.84 V45.86         Plan:  Proceed with UGI as scheduled. Will also proceed with EGD for further evaluation. The risks and benefits of the upper endoscopy were discussed with the patient in detail and all questions were answered.  Possibility of perforation, bleeding, aspiration, and anesthesia reaction were reviewed.  Patient agrees to proceed.      Patient's Body mass index is 37.04 kg/m². indicating that she is obese (BMI >30). Obesity-related health conditions include the following: as " above. Obesity is unchanged. BMI is is above average; BMI management plan is completed. We discussed low calorie, low carb based diet program, portion control, increasing exercise and consulting a Bariatric surgeon..        The patient was instructed to follow up pending imaging and further plan.,  sooner if needed.

## 2021-09-19 ENCOUNTER — APPOINTMENT (OUTPATIENT)
Dept: PREADMISSION TESTING | Facility: HOSPITAL | Age: 31
End: 2021-09-19

## 2021-09-19 LAB — SARS-COV-2 RNA PNL SPEC NAA+PROBE: NOT DETECTED

## 2021-09-19 PROCEDURE — U0004 COV-19 TEST NON-CDC HGH THRU: HCPCS

## 2021-09-19 PROCEDURE — C9803 HOPD COVID-19 SPEC COLLECT: HCPCS

## 2021-09-21 ENCOUNTER — HOSPITAL ENCOUNTER (OUTPATIENT)
Dept: GENERAL RADIOLOGY | Facility: HOSPITAL | Age: 31
Discharge: HOME | End: 2021-09-21
Admitting: SURGERY

## 2021-09-21 DIAGNOSIS — K21.9 GASTROESOPHAGEAL REFLUX DISEASE, UNSPECIFIED WHETHER ESOPHAGITIS PRESENT: ICD-10-CM

## 2021-09-21 PROCEDURE — 74240 X-RAY XM UPR GI TRC 1CNTRST: CPT

## 2021-09-21 RX ADMIN — BARIUM SULFATE 183 ML: 960 POWDER, FOR SUSPENSION ORAL at 12:47

## 2021-09-22 ENCOUNTER — PRE-ADMISSION TESTING (OUTPATIENT)
Dept: PREADMISSION TESTING | Facility: HOSPITAL | Age: 31
End: 2021-09-22

## 2021-09-22 LAB — SARS-COV-2 RNA PNL SPEC NAA+PROBE: NOT DETECTED

## 2021-09-22 PROCEDURE — U0004 COV-19 TEST NON-CDC HGH THRU: HCPCS

## 2021-09-22 PROCEDURE — C9803 HOPD COVID-19 SPEC COLLECT: HCPCS

## 2021-09-23 ENCOUNTER — LAB REQUISITION (OUTPATIENT)
Dept: LAB | Facility: HOSPITAL | Age: 31
End: 2021-09-23

## 2021-09-23 ENCOUNTER — OUTSIDE FACILITY SERVICE (OUTPATIENT)
Dept: BARIATRICS/WEIGHT MGMT | Facility: CLINIC | Age: 31
End: 2021-09-23

## 2021-09-23 DIAGNOSIS — K21.9 GASTRO-ESOPHAGEAL REFLUX DISEASE WITHOUT ESOPHAGITIS: ICD-10-CM

## 2021-09-23 PROCEDURE — 43245 EGD DILATE STRICTURE: CPT | Performed by: SURGERY

## 2021-09-23 PROCEDURE — 88305 TISSUE EXAM BY PATHOLOGIST: CPT | Performed by: SURGERY

## 2021-09-23 PROCEDURE — 43239 EGD BIOPSY SINGLE/MULTIPLE: CPT | Performed by: SURGERY

## 2021-09-24 ENCOUNTER — APPOINTMENT (OUTPATIENT)
Dept: PREADMISSION TESTING | Facility: HOSPITAL | Age: 31
End: 2021-09-24

## 2021-09-24 LAB
CYTO UR: NORMAL
LAB AP CASE REPORT: NORMAL
LAB AP CLINICAL INFORMATION: NORMAL
PATH REPORT.FINAL DX SPEC: NORMAL
PATH REPORT.GROSS SPEC: NORMAL

## 2021-09-30 ENCOUNTER — TELEPHONE (OUTPATIENT)
Dept: FAMILY MEDICINE CLINIC | Facility: CLINIC | Age: 31
End: 2021-09-30

## 2021-10-01 DIAGNOSIS — K21.9 GASTROESOPHAGEAL REFLUX DISEASE, UNSPECIFIED WHETHER ESOPHAGITIS PRESENT: Primary | ICD-10-CM

## 2021-10-19 ENCOUNTER — OFFICE VISIT (OUTPATIENT)
Dept: FAMILY MEDICINE CLINIC | Facility: CLINIC | Age: 31
End: 2021-10-19

## 2021-10-19 VITALS
OXYGEN SATURATION: 99 % | HEART RATE: 102 BPM | SYSTOLIC BLOOD PRESSURE: 120 MMHG | WEIGHT: 237 LBS | DIASTOLIC BLOOD PRESSURE: 80 MMHG | BODY MASS INDEX: 38.09 KG/M2 | RESPIRATION RATE: 18 BRPM | TEMPERATURE: 98.6 F | HEIGHT: 66 IN

## 2021-10-19 DIAGNOSIS — R10.9 ABDOMINAL BLOATING WITH CRAMPS: Primary | ICD-10-CM

## 2021-10-19 DIAGNOSIS — R14.0 ABDOMINAL BLOATING WITH CRAMPS: Primary | ICD-10-CM

## 2021-10-19 PROCEDURE — 99213 OFFICE O/P EST LOW 20 MIN: CPT | Performed by: NURSE PRACTITIONER

## 2021-10-19 NOTE — PROGRESS NOTES
"Chief Complaint  referral for food allergy    Subjective          Riana Anthony presents to [facility]  Patient is a 32 yo female. She is here for request of food allergy panel. She has an increase of abdominal pain, flatus, bloating when dieting. She will follow a high protein and high fiber diet.   No nausea or vomiting. Hx sleeve gastrectomy.         The following portions of the patient's history were reviewed and updated as appropriate: allergies, current medications, past family history, past medical history, past social history, past surgical history and problem list.    Review of Systems   Constitutional: Negative.    HENT: Negative.    Respiratory: Negative.    Cardiovascular: Negative.    Gastrointestinal: Positive for abdominal distention.        Bloating and cramping with certain foods.    Musculoskeletal: Negative.    Skin: Negative.    Allergic/Immunologic: Positive for environmental allergies. Negative for food allergies.   Neurological: Negative.    Hematological: Negative.    Psychiatric/Behavioral: Negative.          Objective   Vital Signs:   /80   Pulse 102   Temp 98.6 °F (37 °C)   Resp 18   Ht 167.6 cm (66\")   Wt 108 kg (237 lb)   SpO2 99%   BMI 38.25 kg/m²     Physical Exam  Vitals reviewed.   Constitutional:       Appearance: She is well-developed. She is not ill-appearing.   HENT:      Head: Normocephalic.      Nose: Nose normal.      Mouth/Throat:      Mouth: Mucous membranes are moist.   Eyes:      Conjunctiva/sclera: Conjunctivae normal.      Pupils: Pupils are equal, round, and reactive to light.   Cardiovascular:      Rate and Rhythm: Normal rate and regular rhythm.      Heart sounds: Normal heart sounds.   Pulmonary:      Effort: Pulmonary effort is normal.      Breath sounds: Normal breath sounds.   Abdominal:      General: Bowel sounds are normal.      Palpations: Abdomen is soft.   Musculoskeletal:         General: Normal range of " motion.      Cervical back: Normal range of motion.   Lymphadenopathy:      Cervical: No cervical adenopathy.   Skin:     General: Skin is warm and dry.      Capillary Refill: Capillary refill takes less than 2 seconds.   Neurological:      Mental Status: She is alert and oriented to person, place, and time.   Psychiatric:         Mood and Affect: Mood normal.         Speech: Speech normal.         Behavior: Behavior normal. Behavior is cooperative.        Result Review :                 Assessment and Plan    Diagnoses and all orders for this visit:    1. Abdominal bloating with cramps (Primary)  -     Ambulatory Referral to Allergy  -     Food Allergen Panel With / IgE    Follow up after results if any questions.   Make appointment for annual physical.   Take simethicone OTC prn for flatus.     Alie Messina, APRN      Follow Up   No follow-ups on file.  Patient was given instructions and counseling regarding her condition or for health maintenance advice. Please see specific information pulled into the AVS if appropriate.

## 2021-10-23 LAB
CLAM IGE QN: <0.1 KU/L
CODFISH IGE QN: <0.1 KU/L
CONV CLASS DESCRIPTION: NORMAL
CORN IGE QN: <0.1 KU/L
COW MILK IGE QN: <0.1 KU/L
EGG WHITE IGE QN: <0.1 KU/L
IGE SERPL-ACNC: 271 IU/ML (ref 6–495)
PEANUT IGE QN: <0.1 KU/L
SCALLOP IGE QN: <0.1 KU/L
SHRIMP IGE QN: <0.1 KU/L
SOYBEAN IGE QN: <0.1 KU/L
WALNUT IGE QN: <0.1 KU/L
WHEAT IGE QN: <0.1 KU/L

## 2021-11-02 ENCOUNTER — HOSPITAL ENCOUNTER (OUTPATIENT)
Dept: NUCLEAR MEDICINE | Facility: HOSPITAL | Age: 31
Discharge: HOME | End: 2021-11-02

## 2021-11-02 DIAGNOSIS — K21.9 GASTROESOPHAGEAL REFLUX DISEASE, UNSPECIFIED WHETHER ESOPHAGITIS PRESENT: ICD-10-CM

## 2021-11-02 PROCEDURE — 78264 GASTRIC EMPTYING IMG STUDY: CPT

## 2021-11-02 PROCEDURE — A9541 TC99M SULFUR COLLOID: HCPCS | Performed by: SURGERY

## 2021-11-02 PROCEDURE — 0 TECHNETIUM SULFUR COLLOID: Performed by: SURGERY

## 2021-11-02 RX ADMIN — TECHNETIUM TC 99M SULFUR COLLOID 1 DOSE: KIT at 08:46

## 2021-11-08 ENCOUNTER — OFFICE VISIT (OUTPATIENT)
Dept: BARIATRICS/WEIGHT MGMT | Facility: CLINIC | Age: 31
End: 2021-11-08

## 2021-11-08 VITALS
HEART RATE: 68 BPM | HEIGHT: 66 IN | TEMPERATURE: 97.3 F | SYSTOLIC BLOOD PRESSURE: 120 MMHG | OXYGEN SATURATION: 99 % | WEIGHT: 235 LBS | BODY MASS INDEX: 37.77 KG/M2 | RESPIRATION RATE: 18 BRPM | DIASTOLIC BLOOD PRESSURE: 72 MMHG

## 2021-11-08 DIAGNOSIS — K80.20 CALCULUS OF GALLBLADDER WITHOUT CHOLECYSTITIS WITHOUT OBSTRUCTION: Primary | ICD-10-CM

## 2021-11-08 PROCEDURE — 99214 OFFICE O/P EST MOD 30 MIN: CPT | Performed by: SURGERY

## 2021-11-08 RX ORDER — SODIUM CHLORIDE 9 MG/ML
150 INJECTION, SOLUTION INTRAVENOUS CONTINUOUS
Status: CANCELLED | OUTPATIENT
Start: 2021-11-08

## 2021-11-08 NOTE — PROGRESS NOTES
"[facility]  [address]  [address]  [facility] [address] [phone]        Patient Name: Riana Anthony.  YOB: 1990      Date of Visit: 11/08/2021      Reason for Visit:  Discuss options    HPI:  Riana Anthony is a 31 y.o. female s/p LSG in [place] 7/14/19    KOFI to Dr. Melchor CARLTON 8/2021, planned UGI and EGD. Future rula possibly.     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  Has been on Zyprexa for 2+ years.  She reports she was addicted to weight-loss medications.  Has always been anxious about her weight.     Weight before surgery was 220.  She lost 20 pounds only and regained.  When she had sleeve, she was instructed to \"eat healthy, focus on proteins.\"  [place] time she went to [place] to have the surgery, and only saw her surgeon once after surgery.  I reviewed sparse op note: 38 Romanian bougie laparoscopic sleeve gastrectomy.  Per patient no complications.     Now c/o GERD that is new since surgery.  She takes Prilosec 40 mg 2-3x per day, PRN.  She got a doctor online from an appt to give her carafate pills, which have helped.     C/o epigastric abdominal pain that is worse when she vapes.  \"I'm addicted to nicotine.\"  Has had to cut back on vaping b/c of the pain.  Also periumbilical pain.     7/29/21 ED visit at [facility] for epigastric pain/vomiting.    CT reviewed:  IMPRESSION:     1. Cholelithiasis without biliary obstruction.  2. Gastric sleeve surgery. GI tract is otherwise normal including the appendix.  3. Small fat-containing ventral wall hernia 5 cm above the umbilicus.  4. Otherwise negative.     They recommended f/u with bariatric surgeon and also referred her to general surgeon re: gallstones.     Had miscarriage in January.  Has 3 boys.  Trying to get pregnant again.\"    11/17/21 Update:    EGD on 9/23/21 by me showed " GEj at 36 cm, no HH, and a relative stircture or twist of proximal stomach with proximal dilated fundus, unsure if just retained from surgery or stretched from small stricture.  Stricture was empirically dilated to 20 mm.      Path:  1.  GASTRIC ANTRUM BIOPSY:  Mild reactive gastropathic changes with background of minimal chronic gastritis without activity.  Negative for intestinal metaplasia and dysplasia.  No Helicobacter pylori-like organisms identified on routine stains.    2.  ESOPHAGUS AT 35 CM:  Squamous mucosa with basal layer hyperplasia and increased intraepithelial leukocytes including neutrophils and rare eosinophils.  Focal changes consistent with surface erosion/ulceration.  Negative for intestinal metaplasia, dysplasia and glandular mucosa.    11/2/21 GES: IMPRESSION:  Normal gastric emptying.    9/8/21 CT: IMPRESSION:  No acute abdominal or pelvic pathology. No evidence of acute cholecystitis.     Upper midline ventral hernia containing omental fat only.    9/21/21 UGI: IMPRESSION:  1. Slightly limited upper GI series due to food present within the  stomach. Further evaluation such as gastric emptying study may be  considered if clinically relevant.  2. Status post vertical sleeve gastrectomy x2 years. There was no  evidence of extraluminal contrast. No postoperative strictures were  seen.  2. Mild gastroesophageal reflux to the level of the cervical esophagus  3. Mild esophageal dysmotility          Today, her symptoms of GERD persist.  Has some epigastric pain . Sometimes has postprandial n/vomiting.  Main symptom is reflux.  She is taking omeprazole 40 mg bid, which has helped a lot, just a little bit of breakthrough reflux.      Still plans additional pregnancy.  Still vapes.    Her psychiatrist has taken her off Zyprexa.  Wants to lose weight to get pregnant.      COVID-19 Questionnaire:    1.  Have you previously been tested for COVID-19?    []  No  [x]  Yes    2.  Were you ever positive for  COVID-19?    [x]  No  []  Yes    3.  Are you employed in a healthcare setting?    [x]  No  []  Yes    4.  Are you symptomatic for COVID-19 as defined by the CDC (fever, cough)?  If so, when did symptoms begin?    [x]  No  []  Yes    5.  Have you been hospitalized for COVID-19?  If so, were you in the ICU?  [x]  No    []  Yes, but not in the ICU    []  Yes, and I was in the ICU    6.  Are you a resident in a congregate (group care setting?)    [x]  No  []  Yes    7.  Are you pregnant?  [x]  No  []  Yes    8.  Are you vaccinated?    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  Follows with psychiatry, on Zyprexa and Prozac.     • Umbilical hernia      Past Surgical History:   Procedure Laterality Date   • GASTRIC SLEEVE LAPAROSCOPIC  20.  38 Portuguese Bougie.  op note rviewed     Outpatient Medications Marked as Taking for the 21 encounter (Office Visit) with Amrita Roche MD   Medication Sig Dispense Refill   • Multiple Vitamins-Minerals (MULTIVITAMIN GUMMIES ADULT PO) Adult Multivitamin Gummies     • omeprazole (priLOSEC) 40 MG capsule Take 1 capsule by mouth Daily. Take 30 minutes 1st meal of the day (Patient taking differently: Take 40 mg by mouth Daily. Take 30 minutes 1st meal of the day. Pt states she sometimes takes 3 a day) 30 capsule 5   • vitamin D3 125 MCG (5000 UT) capsule capsule Take 5,000 Units by mouth Daily.       No Known Allergies    Social History     Socioeconomic History   • Marital status:    Tobacco Use   • Smoking status: Never Smoker   • Smokeless tobacco: Never Used   Vaping Use   • Vaping Use: Every day   • Substances: Nicotine   • Devices: Disposable (pt no longer vapes  7/25/21)   • Passive vaping exposure: Yes   Substance and Sexual Activity   • Alcohol use: No   • Drug use: No   • Sexual activity: Yes     Partners: Male       Vitals:    11/08/21 1308   BP: 120/72   Pulse: 68   Resp: 18   Temp: 97.3 °F (36.3 °C)   SpO2: 99%     Weight 107 kg (235 lb)  Body mass index is 37.93 kg/m².    Physical Exam  Constitutional:       General: She is not in acute distress.     Appearance: She is well-developed. She is not diaphoretic.   HENT:      Head: Normocephalic and atraumatic.      Mouth/Throat:      Pharynx: No oropharyngeal exudate.   Eyes:      Conjunctiva/sclera: Conjunctivae normal.      Pupils: Pupils are equal, round, and reactive to light.   Pulmonary:      Effort: Pulmonary effort is normal. No respiratory distress.   Abdominal:      General: There is no distension.      Palpations: Abdomen is soft.      Comments: Lap scars, incisional hernia at mid epigastric incision.   Skin:     General: Skin is warm and dry.      Coloration: Skin is not pale.   Neurological:      Mental Status: She is alert and oriented to person, place, and time.      Cranial Nerves: No cranial nerve deficit.   Psychiatric:         Behavior: Behavior normal.         Thought Content: Thought content normal.           Assessment:      ICD-10-CM ICD-9-CM   1. Calculus of gallbladder without cholecystitis without obstruction  K80.20 574.20       Plan:      The risks, benefits, and alternatives of laparoscopic cholecystectomy were discussed, including but not limited to anesthesia complications, heart attack, stroke, death, post-cholecystectomy diarrhea, infection, bleeding, bile leak, injury to surrounding structures, particularly the common bile duct.   Pt is aware that her symptoms may not resolve with cholecystectomy.      She does have some retained fundus, and a mild stricture, but I would not offer her RYGB at this time given her hx of nicotine use, and also because I think her main issue is her  gallbladder.  Options for future include dilation with achalasia balloon.  May also find something easy, such as adhesions, I can intervene on during lap rula.  Pt understands and wishes to proceed.

## 2021-11-19 ENCOUNTER — PRE-ADMISSION TESTING (OUTPATIENT)
Dept: PREADMISSION TESTING | Facility: HOSPITAL | Age: 31
End: 2021-11-19

## 2021-11-19 ENCOUNTER — ANESTHESIA EVENT (OUTPATIENT)
Dept: PERIOP | Facility: HOSPITAL | Age: 31
End: 2021-11-19

## 2021-11-19 LAB
DEPRECATED RDW RBC AUTO: 46 FL (ref 37–54)
ERYTHROCYTE [DISTWIDTH] IN BLOOD BY AUTOMATED COUNT: 15.4 % (ref 12.3–15.4)
HCT VFR BLD AUTO: 36.9 % (ref 34–46.6)
HGB BLD-MCNC: 11.5 G/DL (ref 12–15.9)
MCH RBC QN AUTO: 25.6 PG (ref 26.6–33)
MCHC RBC AUTO-ENTMCNC: 31.2 G/DL (ref 31.5–35.7)
MCV RBC AUTO: 82.2 FL (ref 79–97)
PLATELET # BLD AUTO: 433 10*3/MM3 (ref 140–450)
PMV BLD AUTO: 9.8 FL (ref 6–12)
RBC # BLD AUTO: 4.49 10*6/MM3 (ref 3.77–5.28)
WBC NRBC COR # BLD: 8.49 10*3/MM3 (ref 3.4–10.8)

## 2021-11-19 PROCEDURE — 85027 COMPLETE CBC AUTOMATED: CPT

## 2021-11-19 PROCEDURE — U0004 COV-19 TEST NON-CDC HGH THRU: HCPCS | Performed by: SURGERY

## 2021-11-19 PROCEDURE — 36415 COLL VENOUS BLD VENIPUNCTURE: CPT

## 2021-11-19 RX ORDER — OLANZAPINE 2.5 MG/1
2.5 TABLET ORAL NIGHTLY
COMMUNITY
End: 2022-03-08

## 2021-11-19 RX ORDER — FLUOXETINE HYDROCHLORIDE 40 MG/1
40 CAPSULE ORAL 2 TIMES DAILY
COMMUNITY
End: 2022-03-08

## 2021-11-19 RX ORDER — SODIUM CHLORIDE 0.9 % (FLUSH) 0.9 %
10 SYRINGE (ML) INJECTION EVERY 12 HOURS SCHEDULED
Status: CANCELLED | OUTPATIENT
Start: 2021-11-19

## 2021-11-19 RX ORDER — FAMOTIDINE 10 MG/ML
20 INJECTION, SOLUTION INTRAVENOUS ONCE
Status: CANCELLED | OUTPATIENT
Start: 2021-11-19 | End: 2021-11-19

## 2021-11-19 RX ORDER — SODIUM CHLORIDE 0.9 % (FLUSH) 0.9 %
10 SYRINGE (ML) INJECTION AS NEEDED
Status: CANCELLED | OUTPATIENT
Start: 2021-11-19

## 2021-11-19 RX ORDER — SODIUM CHLORIDE, SODIUM LACTATE, POTASSIUM CHLORIDE, CALCIUM CHLORIDE 600; 310; 30; 20 MG/100ML; MG/100ML; MG/100ML; MG/100ML
9 INJECTION, SOLUTION INTRAVENOUS CONTINUOUS
Status: CANCELLED | OUTPATIENT
Start: 2021-11-19

## 2021-11-22 ENCOUNTER — ANESTHESIA (OUTPATIENT)
Dept: PERIOP | Facility: HOSPITAL | Age: 31
End: 2021-11-22

## 2021-11-22 ENCOUNTER — HOSPITAL ENCOUNTER (OUTPATIENT)
Facility: HOSPITAL | Age: 31
Setting detail: HOSPITAL OUTPATIENT SURGERY
Discharge: HOME | End: 2021-11-22
Attending: SURGERY | Admitting: SURGERY

## 2021-11-22 VITALS
OXYGEN SATURATION: 98 % | HEART RATE: 88 BPM | DIASTOLIC BLOOD PRESSURE: 60 MMHG | WEIGHT: 235 LBS | HEIGHT: 66 IN | SYSTOLIC BLOOD PRESSURE: 105 MMHG | TEMPERATURE: 98.2 F | RESPIRATION RATE: 18 BRPM | BODY MASS INDEX: 37.77 KG/M2

## 2021-11-22 DIAGNOSIS — K80.20 CALCULUS OF GALLBLADDER WITHOUT CHOLECYSTITIS WITHOUT OBSTRUCTION: ICD-10-CM

## 2021-11-22 DIAGNOSIS — K21.9 GASTROESOPHAGEAL REFLUX DISEASE, UNSPECIFIED WHETHER ESOPHAGITIS PRESENT: ICD-10-CM

## 2021-11-22 LAB
B-HCG UR QL: NEGATIVE
EXPIRATION DATE: NORMAL
INTERNAL NEGATIVE CONTROL: NORMAL
INTERNAL POSITIVE CONTROL: NORMAL
Lab: NORMAL

## 2021-11-22 PROCEDURE — 25010000002 ONDANSETRON PER 1 MG: Performed by: NURSE ANESTHETIST, CERTIFIED REGISTERED

## 2021-11-22 PROCEDURE — 25010000002 KETOROLAC TROMETHAMINE PER 15 MG: Performed by: NURSE ANESTHETIST, CERTIFIED REGISTERED

## 2021-11-22 PROCEDURE — C1889 IMPLANT/INSERT DEVICE, NOC: HCPCS | Performed by: SURGERY

## 2021-11-22 PROCEDURE — 25010000002 DEXAMETHASONE PER 1 MG: Performed by: NURSE ANESTHETIST, CERTIFIED REGISTERED

## 2021-11-22 PROCEDURE — S0260 H&P FOR SURGERY: HCPCS | Performed by: SURGERY

## 2021-11-22 PROCEDURE — 25010000002 FENTANYL CITRATE (PF) 50 MCG/ML SOLUTION: Performed by: NURSE ANESTHETIST, CERTIFIED REGISTERED

## 2021-11-22 PROCEDURE — 25010000002 NEOSTIGMINE 10 MG/10ML SOLUTION: Performed by: NURSE ANESTHETIST, CERTIFIED REGISTERED

## 2021-11-22 PROCEDURE — 25010000002 FENTANYL CITRATE (PF) 50 MCG/ML SOLUTION

## 2021-11-22 PROCEDURE — 88304 TISSUE EXAM BY PATHOLOGIST: CPT | Performed by: SURGERY

## 2021-11-22 PROCEDURE — 0 CEFAZOLIN IN DEXTROSE 2-4 GM/100ML-% SOLUTION: Performed by: SURGERY

## 2021-11-22 PROCEDURE — 81025 URINE PREGNANCY TEST: CPT | Performed by: ANESTHESIOLOGY

## 2021-11-22 PROCEDURE — 25010000002 HYDROMORPHONE 1 MG/ML SOLUTION

## 2021-11-22 PROCEDURE — 47562 LAPAROSCOPIC CHOLECYSTECTOMY: CPT | Performed by: SURGERY

## 2021-11-22 PROCEDURE — 25010000002 PROPOFOL 10 MG/ML EMULSION: Performed by: NURSE ANESTHETIST, CERTIFIED REGISTERED

## 2021-11-22 PROCEDURE — 25010000002 DROPERIDOL PER 5 MG

## 2021-11-22 DEVICE — LIGAMAX 5 MM ENDOSCOPIC MULTIPLE CLIP APPLIER
Type: IMPLANTABLE DEVICE | Site: ABDOMEN | Status: FUNCTIONAL
Brand: LIGAMAX

## 2021-11-22 RX ORDER — DEXAMETHASONE SODIUM PHOSPHATE 4 MG/ML
INJECTION, SOLUTION INTRA-ARTICULAR; INTRALESIONAL; INTRAMUSCULAR; INTRAVENOUS; SOFT TISSUE AS NEEDED
Status: DISCONTINUED | OUTPATIENT
Start: 2021-11-22 | End: 2021-11-22 | Stop reason: SURG

## 2021-11-22 RX ORDER — SODIUM CHLORIDE 0.9 % (FLUSH) 0.9 %
3-10 SYRINGE (ML) INJECTION AS NEEDED
Status: DISCONTINUED | OUTPATIENT
Start: 2021-11-22 | End: 2021-11-22 | Stop reason: HOSPADM

## 2021-11-22 RX ORDER — ONDANSETRON 2 MG/ML
INJECTION INTRAMUSCULAR; INTRAVENOUS AS NEEDED
Status: DISCONTINUED | OUTPATIENT
Start: 2021-11-22 | End: 2021-11-22 | Stop reason: SURG

## 2021-11-22 RX ORDER — PROMETHAZINE HYDROCHLORIDE 25 MG/1
25 TABLET ORAL ONCE AS NEEDED
Status: DISCONTINUED | OUTPATIENT
Start: 2021-11-22 | End: 2021-11-22 | Stop reason: HOSPADM

## 2021-11-22 RX ORDER — FENTANYL CITRATE 50 UG/ML
50 INJECTION, SOLUTION INTRAMUSCULAR; INTRAVENOUS
Status: DISCONTINUED | OUTPATIENT
Start: 2021-11-22 | End: 2021-11-22 | Stop reason: HOSPADM

## 2021-11-22 RX ORDER — SODIUM CHLORIDE 9 MG/ML
150 INJECTION, SOLUTION INTRAVENOUS CONTINUOUS
Status: DISCONTINUED | OUTPATIENT
Start: 2021-11-22 | End: 2021-11-22 | Stop reason: HOSPADM

## 2021-11-22 RX ORDER — KETOROLAC TROMETHAMINE 30 MG/ML
INJECTION, SOLUTION INTRAMUSCULAR; INTRAVENOUS AS NEEDED
Status: DISCONTINUED | OUTPATIENT
Start: 2021-11-22 | End: 2021-11-22 | Stop reason: SURG

## 2021-11-22 RX ORDER — FENTANYL CITRATE 50 UG/ML
INJECTION, SOLUTION INTRAMUSCULAR; INTRAVENOUS
Status: COMPLETED
Start: 2021-11-22 | End: 2021-11-22

## 2021-11-22 RX ORDER — NALOXONE HCL 0.4 MG/ML
0.4 VIAL (ML) INJECTION AS NEEDED
Status: DISCONTINUED | OUTPATIENT
Start: 2021-11-22 | End: 2021-11-22 | Stop reason: HOSPADM

## 2021-11-22 RX ORDER — OXYCODONE HYDROCHLORIDE 5 MG/1
5 TABLET ORAL EVERY 6 HOURS PRN
Qty: 10 TABLET | Refills: 0 | Status: SHIPPED | OUTPATIENT
Start: 2021-11-22 | End: 2022-03-08

## 2021-11-22 RX ORDER — PROMETHAZINE HYDROCHLORIDE 25 MG/1
25 SUPPOSITORY RECTAL ONCE AS NEEDED
Status: DISCONTINUED | OUTPATIENT
Start: 2021-11-22 | End: 2021-11-22 | Stop reason: HOSPADM

## 2021-11-22 RX ORDER — FENTANYL CITRATE 50 UG/ML
INJECTION, SOLUTION INTRAMUSCULAR; INTRAVENOUS AS NEEDED
Status: DISCONTINUED | OUTPATIENT
Start: 2021-11-22 | End: 2021-11-22 | Stop reason: SURG

## 2021-11-22 RX ORDER — HYDROCODONE BITARTRATE AND ACETAMINOPHEN 5; 325 MG/1; MG/1
1 TABLET ORAL ONCE AS NEEDED
Status: DISCONTINUED | OUTPATIENT
Start: 2021-11-22 | End: 2021-11-22 | Stop reason: HOSPADM

## 2021-11-22 RX ORDER — NEOSTIGMINE METHYLSULFATE 1 MG/ML
INJECTION, SOLUTION INTRAVENOUS AS NEEDED
Status: DISCONTINUED | OUTPATIENT
Start: 2021-11-22 | End: 2021-11-22 | Stop reason: SURG

## 2021-11-22 RX ORDER — SODIUM CHLORIDE 0.9 % (FLUSH) 0.9 %
3 SYRINGE (ML) INJECTION EVERY 12 HOURS SCHEDULED
Status: DISCONTINUED | OUTPATIENT
Start: 2021-11-22 | End: 2021-11-22 | Stop reason: HOSPADM

## 2021-11-22 RX ORDER — ONDANSETRON 4 MG/1
4 TABLET, ORALLY DISINTEGRATING ORAL EVERY 8 HOURS PRN
Qty: 10 TABLET | Refills: 0 | Status: SHIPPED | OUTPATIENT
Start: 2021-11-22 | End: 2022-05-26

## 2021-11-22 RX ORDER — IPRATROPIUM BROMIDE AND ALBUTEROL SULFATE 2.5; .5 MG/3ML; MG/3ML
3 SOLUTION RESPIRATORY (INHALATION) ONCE AS NEEDED
Status: DISCONTINUED | OUTPATIENT
Start: 2021-11-22 | End: 2021-11-22 | Stop reason: HOSPADM

## 2021-11-22 RX ORDER — MAGNESIUM HYDROXIDE 1200 MG/15ML
LIQUID ORAL AS NEEDED
Status: DISCONTINUED | OUTPATIENT
Start: 2021-11-22 | End: 2021-11-22 | Stop reason: HOSPADM

## 2021-11-22 RX ORDER — LABETALOL HYDROCHLORIDE 5 MG/ML
5 INJECTION, SOLUTION INTRAVENOUS
Status: DISCONTINUED | OUTPATIENT
Start: 2021-11-22 | End: 2021-11-22 | Stop reason: HOSPADM

## 2021-11-22 RX ORDER — LIDOCAINE HYDROCHLORIDE 10 MG/ML
INJECTION, SOLUTION EPIDURAL; INFILTRATION; INTRACAUDAL; PERINEURAL AS NEEDED
Status: DISCONTINUED | OUTPATIENT
Start: 2021-11-22 | End: 2021-11-22 | Stop reason: SURG

## 2021-11-22 RX ORDER — SODIUM CHLORIDE, SODIUM LACTATE, POTASSIUM CHLORIDE, CALCIUM CHLORIDE 600; 310; 30; 20 MG/100ML; MG/100ML; MG/100ML; MG/100ML
INJECTION, SOLUTION INTRAVENOUS CONTINUOUS PRN
Status: DISCONTINUED | OUTPATIENT
Start: 2021-11-22 | End: 2021-11-22 | Stop reason: SURG

## 2021-11-22 RX ORDER — ONDANSETRON 2 MG/ML
4 INJECTION INTRAMUSCULAR; INTRAVENOUS ONCE AS NEEDED
Status: DISCONTINUED | OUTPATIENT
Start: 2021-11-22 | End: 2021-11-22 | Stop reason: HOSPADM

## 2021-11-22 RX ORDER — DROPERIDOL 2.5 MG/ML
0.62 INJECTION, SOLUTION INTRAMUSCULAR; INTRAVENOUS ONCE AS NEEDED
Status: DISCONTINUED | OUTPATIENT
Start: 2021-11-22 | End: 2021-11-22 | Stop reason: HOSPADM

## 2021-11-22 RX ORDER — EPHEDRINE SULFATE 50 MG/ML
INJECTION, SOLUTION INTRAVENOUS AS NEEDED
Status: DISCONTINUED | OUTPATIENT
Start: 2021-11-22 | End: 2021-11-22 | Stop reason: SURG

## 2021-11-22 RX ORDER — CEFAZOLIN SODIUM 2 G/100ML
2 INJECTION, SOLUTION INTRAVENOUS ONCE
Status: COMPLETED | OUTPATIENT
Start: 2021-11-22 | End: 2021-11-22

## 2021-11-22 RX ORDER — ROCURONIUM BROMIDE 10 MG/ML
INJECTION, SOLUTION INTRAVENOUS AS NEEDED
Status: DISCONTINUED | OUTPATIENT
Start: 2021-11-22 | End: 2021-11-22 | Stop reason: SURG

## 2021-11-22 RX ORDER — SODIUM CHLORIDE 9 MG/ML
INJECTION, SOLUTION INTRAVENOUS AS NEEDED
Status: DISCONTINUED | OUTPATIENT
Start: 2021-11-22 | End: 2021-11-22 | Stop reason: HOSPADM

## 2021-11-22 RX ORDER — GLYCOPYRROLATE 0.2 MG/ML
INJECTION INTRAMUSCULAR; INTRAVENOUS AS NEEDED
Status: DISCONTINUED | OUTPATIENT
Start: 2021-11-22 | End: 2021-11-22 | Stop reason: SURG

## 2021-11-22 RX ORDER — ONDANSETRON 2 MG/ML
INJECTION INTRAMUSCULAR; INTRAVENOUS
Status: DISCONTINUED
Start: 2021-11-22 | End: 2021-11-22 | Stop reason: HOSPADM

## 2021-11-22 RX ORDER — HYDROMORPHONE HYDROCHLORIDE 1 MG/ML
0.5 INJECTION, SOLUTION INTRAMUSCULAR; INTRAVENOUS; SUBCUTANEOUS
Status: DISCONTINUED | OUTPATIENT
Start: 2021-11-22 | End: 2021-11-22 | Stop reason: HOSPADM

## 2021-11-22 RX ORDER — DROPERIDOL 2.5 MG/ML
0.62 INJECTION, SOLUTION INTRAMUSCULAR; INTRAVENOUS AS NEEDED
Status: DISCONTINUED | OUTPATIENT
Start: 2021-11-22 | End: 2021-11-22 | Stop reason: HOSPADM

## 2021-11-22 RX ORDER — MEPERIDINE HYDROCHLORIDE 25 MG/ML
12.5 INJECTION INTRAMUSCULAR; INTRAVENOUS; SUBCUTANEOUS
Status: DISCONTINUED | OUTPATIENT
Start: 2021-11-22 | End: 2021-11-22 | Stop reason: HOSPADM

## 2021-11-22 RX ORDER — PROPOFOL 10 MG/ML
VIAL (ML) INTRAVENOUS AS NEEDED
Status: DISCONTINUED | OUTPATIENT
Start: 2021-11-22 | End: 2021-11-22 | Stop reason: SURG

## 2021-11-22 RX ORDER — FAMOTIDINE 20 MG/1
20 TABLET, FILM COATED ORAL ONCE
Status: COMPLETED | OUTPATIENT
Start: 2021-11-22 | End: 2021-11-22

## 2021-11-22 RX ORDER — HYDRALAZINE HYDROCHLORIDE 20 MG/ML
5 INJECTION INTRAMUSCULAR; INTRAVENOUS
Status: DISCONTINUED | OUTPATIENT
Start: 2021-11-22 | End: 2021-11-22 | Stop reason: HOSPADM

## 2021-11-22 RX ORDER — MIDAZOLAM HYDROCHLORIDE 1 MG/ML
1 INJECTION INTRAMUSCULAR; INTRAVENOUS
Status: DISCONTINUED | OUTPATIENT
Start: 2021-11-22 | End: 2021-11-22 | Stop reason: HOSPADM

## 2021-11-22 RX ORDER — DROPERIDOL 2.5 MG/ML
INJECTION, SOLUTION INTRAMUSCULAR; INTRAVENOUS
Status: COMPLETED
Start: 2021-11-22 | End: 2021-11-22

## 2021-11-22 RX ORDER — LIDOCAINE HYDROCHLORIDE 10 MG/ML
0.5 INJECTION, SOLUTION EPIDURAL; INFILTRATION; INTRACAUDAL; PERINEURAL ONCE AS NEEDED
Status: COMPLETED | OUTPATIENT
Start: 2021-11-22 | End: 2021-11-22

## 2021-11-22 RX ADMIN — LIDOCAINE HYDROCHLORIDE 0.5 ML: 10 INJECTION, SOLUTION EPIDURAL; INFILTRATION; INTRACAUDAL; PERINEURAL at 11:49

## 2021-11-22 RX ADMIN — HYDROMORPHONE HYDROCHLORIDE 0.5 MG: 1 INJECTION, SOLUTION INTRAMUSCULAR; INTRAVENOUS; SUBCUTANEOUS at 14:35

## 2021-11-22 RX ADMIN — FENTANYL CITRATE 50 MCG: 50 INJECTION, SOLUTION INTRAMUSCULAR; INTRAVENOUS at 15:33

## 2021-11-22 RX ADMIN — SODIUM CHLORIDE, POTASSIUM CHLORIDE, SODIUM LACTATE AND CALCIUM CHLORIDE: 600; 310; 30; 20 INJECTION, SOLUTION INTRAVENOUS at 13:11

## 2021-11-22 RX ADMIN — CEFAZOLIN SODIUM 2 G: 2 INJECTION, SOLUTION INTRAVENOUS at 13:01

## 2021-11-22 RX ADMIN — GLYCOPYRROLATE 0.4 MG: 0.2 INJECTION INTRAMUSCULAR; INTRAVENOUS at 14:09

## 2021-11-22 RX ADMIN — SODIUM CHLORIDE 500 ML: 9 INJECTION, SOLUTION INTRAVENOUS at 11:51

## 2021-11-22 RX ADMIN — FENTANYL CITRATE 50 MCG: 50 INJECTION, SOLUTION INTRAMUSCULAR; INTRAVENOUS at 15:00

## 2021-11-22 RX ADMIN — FENTANYL CITRATE 50 MCG: 50 INJECTION, SOLUTION INTRAMUSCULAR; INTRAVENOUS at 14:44

## 2021-11-22 RX ADMIN — ROCURONIUM BROMIDE 50 MG: 10 INJECTION, SOLUTION INTRAVENOUS at 13:07

## 2021-11-22 RX ADMIN — LIDOCAINE HYDROCHLORIDE 50 MG: 10 INJECTION, SOLUTION EPIDURAL; INFILTRATION; INTRACAUDAL; PERINEURAL at 13:07

## 2021-11-22 RX ADMIN — DROPERIDOL 0.62 MG: 2.5 INJECTION, SOLUTION INTRAMUSCULAR; INTRAVENOUS at 14:40

## 2021-11-22 RX ADMIN — DEXAMETHASONE SODIUM PHOSPHATE 8 MG: 4 INJECTION, SOLUTION INTRA-ARTICULAR; INTRALESIONAL; INTRAMUSCULAR; INTRAVENOUS; SOFT TISSUE at 13:29

## 2021-11-22 RX ADMIN — EPHEDRINE SULFATE 5 MG: 50 INJECTION INTRAVENOUS at 13:32

## 2021-11-22 RX ADMIN — EPHEDRINE SULFATE 5 MG: 50 INJECTION INTRAVENOUS at 13:33

## 2021-11-22 RX ADMIN — PROPOFOL 200 MG: 10 INJECTION, EMULSION INTRAVENOUS at 13:07

## 2021-11-22 RX ADMIN — FENTANYL CITRATE 100 MCG: 50 INJECTION, SOLUTION INTRAMUSCULAR; INTRAVENOUS at 13:07

## 2021-11-22 RX ADMIN — ONDANSETRON 4 MG: 2 INJECTION INTRAMUSCULAR; INTRAVENOUS at 13:29

## 2021-11-22 RX ADMIN — KETOROLAC TROMETHAMINE 30 MG: 30 INJECTION, SOLUTION INTRAMUSCULAR; INTRAVENOUS at 14:09

## 2021-11-22 RX ADMIN — EPHEDRINE SULFATE 5 MG: 50 INJECTION INTRAVENOUS at 13:31

## 2021-11-22 RX ADMIN — FAMOTIDINE 20 MG: 20 TABLET ORAL at 12:02

## 2021-11-22 RX ADMIN — SODIUM CHLORIDE, POTASSIUM CHLORIDE, SODIUM LACTATE AND CALCIUM CHLORIDE: 600; 310; 30; 20 INJECTION, SOLUTION INTRAVENOUS at 13:00

## 2021-11-22 RX ADMIN — NEOSTIGMINE METHYLSULFATE 2.5 MG: 0.5 INJECTION INTRAVENOUS at 14:09

## 2021-11-22 NOTE — ANESTHESIA PREPROCEDURE EVALUATION
Anesthesia Evaluation     Patient summary reviewed and Nursing notes reviewed   no history of anesthetic complications:  NPO Solid Status: > 8 hours  NPO Liquid Status: > 2 hours           Airway   Mallampati: II  TM distance: >3 FB  Neck ROM: full  No difficulty expected  Dental - normal exam     Pulmonary - normal exam    breath sounds clear to auscultation  (+) a smoker (Vapes),   Cardiovascular - negative cardio ROS and normal exam    Rhythm: regular  Rate: normal        Neuro/Psych- negative ROS  GI/Hepatic/Renal/Endo    (+) obesity,  GERD well controlled,      Musculoskeletal     (+) back pain,   Abdominal    Substance History      OB/GYN          Other                        Anesthesia Plan    ASA 2     general     intravenous induction     Anesthetic plan, all risks, benefits, and alternatives have been provided, discussed and informed consent has been obtained with: patient.    Plan discussed with CRNA.

## 2021-11-22 NOTE — BRIEF OP NOTE
CHOLECYSTECTOMY LAPAROSCOPIC  Progress Note    Riana Anthony  11/22/2021    Pre-op Diagnosis:   Calculus of gallbladder without cholecystitis without obstruction [K80.20]       Post-Op Diagnosis Codes:     * Calculus of gallbladder without cholecystitis without obstruction [K80.20]    Procedure/CPT® Codes:  SD LAP,CHOLECYSTECTOMY [66876]      Procedure(s):  CHOLECYSTECTOMY LAPAROSCOPIC    Surgeon(s):  Amrita Roche MD    Anesthesia: General    Staff:   Circulator: Sarahi Mondragon RN; Zohaib Villagran RN  Scrub Person: Kunal Espitia  Nursing Assistant: Migdalia Orona PCT; Melinda Bauman CNA         Estimated Blood Loss: 50 mL    Urine Voided: * No values recorded between 11/22/2021  1:00 PM and 11/22/2021  2:28 PM *    Specimens:                Specimens     ID Source Type Tests Collected By Collected At Frozen?    A Gallbladder Tissue · TISSUE PATHOLOGY EXAM   Amrita Roche MD 11/22/21 1341 No    Description: gallbladder and collection bag                Drains: * No LDAs found *    Findings: Normal external appearance of gallbladder, which had small stones.  Normal external appearance of sleeved stomach without twist or adhesions.  No hiatal hernia seen from anterior view.  Port site hernia above umbilicus with contents that reduced spontaneously.    Complications: None immediate.          Amrita Roche MD     Date: 11/22/2021  Time: 14:39 EST

## 2021-11-22 NOTE — H&P
"       Expand All Collapse All          Show:Clear all  [x]Manual[x]Template[x]Copied    Added by:  [x]Amrita Roche MD      []Cristi for details    [facility]  [address]  [address]  [facility] [address]  [phone]           Patient Name: Riana Anthony.  YOB: 1990        Date of Visit: 11/08/2021        Reason for Visit:  Discuss options     HPI:  Riana Anthony is a 31 y.o. female s/p LSG in [place] 7/14/19     KOFI to Dr. Roche LOV 8/2021, planned UGI and EGD. Future rula possibly.      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  Has been on Zyprexa for 2+ years.  She reports she was addicted to weight-loss medications.  Has always been anxious about her weight.     Weight before surgery was 220.  She lost 20 pounds only and regained.  When she had sleeve, she was instructed to \"eat healthy, focus on proteins.\"  [place] time she went to [place] to have the surgery, and only saw her surgeon once after surgery.  I reviewed sparse op note: 38 Portuguese bougie laparoscopic sleeve gastrectomy.  Per patient no complications.     Now c/o GERD that is new since surgery.  She takes Prilosec 40 mg 2-3x per day, PRN.  She got a doctor online from an appt to give her carafate pills, which have helped.     C/o epigastric abdominal pain that is worse when she vapes.  \"I'm addicted to nicotine.\"  Has had to cut back on vaping b/c of the pain.  Also periumbilical pain.     7/29/21 ED visit at [facility] for epigastric pain/vomiting.    CT reviewed:  IMPRESSION:     1. Cholelithiasis without biliary obstruction.  2. Gastric sleeve surgery. GI tract is otherwise normal including the appendix.  3. Small fat-containing ventral wall hernia 5 cm above the umbilicus.  4. Otherwise negative.     They recommended f/u with bariatric surgeon and also referred " "her to general surgeon re: gallstones.     Had miscarriage in January.  Has 3 boys.  Trying to get pregnant again.\"     11/17/21 Update:     EGD on 9/23/21 by me showed GEj at 36 cm, no HH, and a relative stircture or twist of proximal stomach with proximal dilated fundus, unsure if just retained from surgery or stretched from small stricture.  Stricture was empirically dilated to 20 mm.       Path:  1.  GASTRIC ANTRUM BIOPSY:  Mild reactive gastropathic changes with background of minimal chronic gastritis without activity.  Negative for intestinal metaplasia and dysplasia.  No Helicobacter pylori-like organisms identified on routine stains.    2.  ESOPHAGUS AT 35 CM:  Squamous mucosa with basal layer hyperplasia and increased intraepithelial leukocytes including neutrophils and rare eosinophils.  Focal changes consistent with surface erosion/ulceration.  Negative for intestinal metaplasia, dysplasia and glandular mucosa.     11/2/21 GES: IMPRESSION:  Normal gastric emptying.     9/8/21 CT: IMPRESSION:  No acute abdominal or pelvic pathology. No evidence of acute cholecystitis.     Upper midline ventral hernia containing omental fat only.     9/21/21 UGI: IMPRESSION:  1. Slightly limited upper GI series due to food present within the  stomach. Further evaluation such as gastric emptying study may be  considered if clinically relevant.  2. Status post vertical sleeve gastrectomy x2 years. There was no  evidence of extraluminal contrast. No postoperative strictures were  seen.  2. Mild gastroesophageal reflux to the level of the cervical esophagus  3. Mild esophageal dysmotility            Today, her symptoms of GERD persist.  Has some epigastric pain . Sometimes has postprandial n/vomiting.  Main symptom is reflux.  She is taking omeprazole 40 mg bid, which has helped a lot, just a little bit of breakthrough reflux.       Still plans additional pregnancy.  Still vapes.     Her psychiatrist has taken her off Zyprexa.  " Wants to lose weight to get pregnant.       COVID-19 Questionnaire:     1.  Have you previously been tested for COVID-19?                          []?  No              [x]?  Yes     2.  Were you ever positive for COVID-19?                          [x]?  No              []?  Yes     3.  Are you employed in a healthcare setting?                         [x]?  No              []?  Yes     4.  Are you symptomatic for COVID-19 as defined by the CDC (fever, cough)?  If so, when did symptoms begin?                          [x]?  No              []?  Yes     5.  Have you been hospitalized for COVID-19?  If so, were you in the ICU?  [x]?  No     []?  Yes, but not in the ICU     []?  Yes, and I was in the ICU     6.  Are you a resident in a congregate (group care setting?)                          [x]?  No              []?  Yes     7.  Are you pregnant?  [x]?  No              []?  Yes     8.  Are you vaccinated?                          []?  No              []?  Yes, but only partially       [x]?  Yes, fully                    Medical History        Past Medical History:   Diagnosis Date   • Back pain     • Gallstones     • GERD (gastroesophageal reflux disease)     • Irregular periods     • Miscarriage       x 1   •  (normal spontaneous vaginal delivery)       x 3   • Psychosis (HCC)       secondary to Hydroxycut use.  Follows with psychiatry, on Zyprexa and Prozac.     • Umbilical hernia           Surgical History         Past Surgical History:   Procedure Laterality Date   • GASTRIC SLEEVE LAPAROSCOPIC   20.  38 Croatian Bougie.  op note rviewed         Medications Taking          Outpatient Medications Marked as Taking for the 21 encounter (Office Visit) with Amrita Roche MD   Medication Sig Dispense Refill   • Multiple Vitamins-Minerals (MULTIVITAMIN GUMMIES ADULT PO) Adult Multivitamin Gummies       • omeprazole (priLOSEC) 40 MG capsule Take 1 capsule by mouth Daily. Take 30 minutes 1st  meal of the day (Patient taking differently: Take 40 mg by mouth Daily. Take 30 minutes 1st meal of the day. Pt states she sometimes takes 3 a day) 30 capsule 5   • vitamin D3 125 MCG (5000 UT) capsule capsule Take 5,000 Units by mouth Daily.             No Known Allergies     Social History   Social History            Socioeconomic History   • Marital status:    Tobacco Use   • Smoking status: Never Smoker   • Smokeless tobacco: Never Used   Vaping Use   • Vaping Use: Every day   • Substances: Nicotine   • Devices: Disposable (pt no longer vapes 7/25/21)   • Passive vaping exposure: Yes   Substance and Sexual Activity   • Alcohol use: No   • Drug use: No   • Sexual activity: Yes       Partners: Male                Vitals:     11/08/21 1308   BP: 120/72   Pulse: 68   Resp: 18   Temp: 97.3 °F (36.3 °C)   SpO2: 99%      Weight 107 kg (235 lb)  Body mass index is 37.93 kg/m².     Physical Exam  Constitutional:       General: She is not in acute distress.     Appearance: She is well-developed. She is not diaphoretic.   HENT:      Head: Normocephalic and atraumatic.      Mouth/Throat:      Pharynx: No oropharyngeal exudate.   Eyes:      Conjunctiva/sclera: Conjunctivae normal.      Pupils: Pupils are equal, round, and reactive to light.   Pulmonary:      Effort: Pulmonary effort is normal. No respiratory distress.   Abdominal:      General: There is no distension.      Palpations: Abdomen is soft.      Comments: Lap scars, incisional hernia at mid epigastric incision.   Skin:     General: Skin is warm and dry.      Coloration: Skin is not pale.   Neurological:      Mental Status: She is alert and oriented to person, place, and time.      Cranial Nerves: No cranial nerve deficit.   Psychiatric:         Behavior: Behavior normal.         Thought Content: Thought content normal.               Assessment:    Visit Diagnosis       ICD-10-CM ICD-9-CM   1. Calculus of gallbladder without cholecystitis without obstruction  " K80.20 574.20            Plan:       The risks, benefits, and alternatives of laparoscopic cholecystectomy were discussed, including but not limited to anesthesia complications, heart attack, stroke, death, post-cholecystectomy diarrhea, infection, bleeding, bile leak, injury to surrounding structures, particularly the common bile duct.   Pt is aware that her symptoms may not resolve with cholecystectomy.        She does have some retained fundus, and a mild stricture, but I would not offer her RYGB at this time given her hx of nicotine use, and also because I think her main issue is her gallbladder.  Options for future include dilation with achalasia balloon.  May also find something easy, such as adhesions, I can intervene on during lap rula.  Pt understands and wishes to proceed.                  [facility] Pre-op    Full history and physical note from office is up to date.     There were no vitals taken for this visit.  Patient denies allergy to contrast dye or latex  IMM:  Influenza: Yes  Pneumococcal: No  Tetanus: \"Probably\"  Lungs: Clear to auscultation bilaterally to the bases  Abdomen: Soft, nontender, bowel sounds present throughout.  Cardiovascular: S1-S2 without rubs murmurs or gallops    LAB Results:  Lab Results   Component Value Date    WBC 8.49 11/19/2021    HGB 11.5 (L) 11/19/2021    HCT 36.9 11/19/2021    MCV 82.2 11/19/2021     11/19/2021    NEUTROABS 6.12 09/08/2021    GLUCOSE 101 (H) 09/08/2021    BUN 9 09/08/2021    CREATININE 0.83 09/08/2021    EGFRIFNONA 81 09/08/2021    EGFRIFAFRI 104 08/18/2021     09/08/2021    K 4.1 09/08/2021     09/08/2021    CO2 23.0 09/08/2021    CALCIUM 9.5 09/08/2021    ALBUMIN 4.30 09/08/2021    AST 55 (H) 09/08/2021    ALT 66 (H) 09/08/2021    BILITOT 0.2 09/08/2021       Cancer Staging (if applicable)  Cancer Patient: __ yes __no __unknown__N/A; If yes, clinical stage T:__ N:__M:__, stage group or __N/A  Impression: Calculus of " the gallbladder without cholecystitis or obstruction  Plan: Laparoscopic cholecystectomy.  TANJA Beatty   11/22/21   11:39 AM EST

## 2021-11-22 NOTE — ANESTHESIA PROCEDURE NOTES
Airway  Urgency: elective    Date/Time: 11/22/2021 1:09 PM  Airway not difficult    General Information and Staff    Patient location during procedure: OR  CRNA: Kolby Prasad CRNA    Indications and Patient Condition  Indications for airway management: airway protection    Preoxygenated: yes  MILS not maintained throughout  Mask difficulty assessment: 1 - vent by mask    Final Airway Details  Final airway type: endotracheal airway      Successful airway: ETT  Cuffed: yes   Successful intubation technique: direct laryngoscopy  Endotracheal tube insertion site: oral  Blade: Davey  Blade size: 3  ETT size (mm): 7.5  Cormack-Lehane Classification: grade I - full view of glottis  Placement verified by: chest auscultation and capnometry   Measured from: lips  ETT/EBT  to lips (cm): 20  Number of attempts at approach: 1  Assessment: lips, teeth, and gum same as pre-op and atraumatic intubation    Additional Comments  Negative epigastric sounds, Breath sound equal bilaterally with symmetric chest rise and fall

## 2021-11-22 NOTE — ANESTHESIA POSTPROCEDURE EVALUATION
Patient: Riana Anthony    Procedure Summary     Date: 11/22/21 Room / Location:  [facility]  /  [facility]    Anesthesia Start: 1300 Anesthesia Stop: 1428    Procedure: CHOLECYSTECTOMY LAPAROSCOPIC (N/A Abdomen) Diagnosis:       Calculus of gallbladder without cholecystitis without obstruction      (Calculus of gallbladder without cholecystitis without obstruction [K80.20])    Surgeons: Amrita Roche MD Provider: Lit Ly MD    Anesthesia Type: general ASA Status: 2          Anesthesia Type: general    Vitals  Vitals Value Taken Time   BP 99/56 11/22/21 1530   Temp 98.2 °F (36.8 °C) 11/22/21 1530   Pulse 87 11/22/21 1534   Resp 18 11/22/21 1530   SpO2 98 % 11/22/21 1534   Vitals shown include unvalidated device data.        Post Anesthesia Care and Evaluation    Patient location during evaluation: PACU  Patient participation: complete - patient participated  Level of consciousness: awake and alert  Pain management: adequate  Airway patency: patent  Anesthetic complications: No anesthetic complications  PONV Status: none  Cardiovascular status: hemodynamically stable and acceptable  Respiratory status: nonlabored ventilation, acceptable and nasal cannula  Hydration status: acceptable

## 2021-11-22 NOTE — OP NOTE
OPERATIVE REPORT    DATE: 11/22/21    PATIENT: Riana Anthony     PREOPERATIVE DIAGNOSIS:    1. Symptomatic cholelithaisis     POSTOPERATIVE DIAGNOSIS:    1. Symptomatic cholelithaisis     PROCEDURES PERFORMED:  1. Laparoscopic cholecystectomy  2.  Diagnostic laparoscopy    SURGEON:  Amrita Roche M.D.     ANESTHESIA:  General endotracheal.     ESTIMATED BLOOD LOSS:   50 mL     FLUIDS:  Crystalloids.     SPECIMENS:  gallbladder      DRAINS:  None.     COUNTS:  Correct.     COMPLICATIONS:  None.     FINDINGS: Normal external appearance of gallbladder, which had small stones.  Normal external appearance of sleeved stomach without twist or adhesions.  No hiatal hernia seen from anterior view.  Port site hernia above umbilicus with contents that reduced spontaneously.     INDICATIONS:   iRana Anthony is a 31 y.o. 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  She transferred care to our practice in 8/2021 with complaint of de jenna GERD since surgery that was poorly controlled on Prilosec 40 mg 2-3 times per day.  She also has had vomiting.  A CT scan in July 2021 showed cholelithiasis without biliary obstruction.  Gastric emptying study was normal, and upper GI series showed food present in the stomach, reflux to the level of the cervical esophagus, mild esophageal dysmotility, and notably no hiatal hernia.  I performed an EGD on 9/23/2021 that showed the GE junction at 36 cm, no hiatal hernia, and a relative stricture or twist of the proximal stomach with proximal dilated versus retained fundus.  Her symptoms of epigastric pain and nausea persist.    The patient is here today for laparoscopic cholecystectomy.  The risks, benefits, and alternatives to the procedure were explained to the patient and they gave consent for surgery.  She is aware that I will plan to laparoscopically visualize the sleeve and may take down any constricting adhesions that  I may see.  She is aware that I have no plans to revise the sleeve or convert to gastric bypass at this time as she is a current smoker.  She also has history of an epigastric port site hernia, and this would not be repaired today as she plans to have future pregnancies.      DESCRIPTION OF PROCEDURE:   After informed consent was taken, the patient was brought to the operating room and placed in supine position.  SCDs were placed. The patient underwent uneventful general endotracheal anesthesia.  She received subcutaneous Lovenox and was given Ancef. The abdomen was prepped with ChloraPrep and draped in the usual sterile fashion. Timeout was performed.     Incision was made 15 cm below xiphoid and to the left, and the abdomen was entered using a 5 mm optiview trocar. Diagnostic laparoscopy was performed to make sure no injury and none was observed.  Additional 5 mm ports were placed in the right upper quadrant and the subxiphoid area, and an 11 mm port was placed in the right abdomen.  I visualize the epigastric old port site hernia.  It had no contents and no adhesions.  Hernia contents that apparently spontaneously reduced upon induction of anesthesia.  Gallbladder was normal in appearance without significant inflammation or adhesions.  The gallbladder was grasped and retracted up over the liver.  The neck of the gallbladder was grasped, and dissection was began.  The cystic duct and artery were skeletonized and the liver was seen behind them; the critical view of safety was obtained.  Both structures were doubly clipped and cut.  The gallbladder was dissected off the liver bed with cautery.  The gallbladder was completely removed off the liver bed then placed in an Endo Catch bag and removed and sent for permanent specimen.  There have been inadvertent cholecystotomy with spillage of clear yellow bile.  This was promptly suctioned.  There was also some bleeding from the liver bed, and this was controlled with  cautery.  The right upper quadrant was copiously irrigated and suctioned until the effluent ran clear.  The liver bed and clips were examined and there was no bleeding or bile leakage.      The bed was repositioned with the right side down, and then I turned my attention to the stomach.  I placed an additional 5 mm port in the left upper quadrant to help with retraction.  The sleeve was a narrow uniform tube that was lying nicely without twist or obvious external impingement.  I could visualize the anterior aspect of the hiatus, and there was no hiatal hernia.  I did not dissected out the left hemant.  There were no adhesions from the sleeve to the underside of the left lobe of the liver, and there was no evidence of any obstruction or twist from the laparoscopic view.    The ports were each removed and replaced with no bleeding from port sites.  The abdomen was desufflated and the ports were removed.  The skin incisions were closed with 3-0 monocryl.  SureClose was placed and the incisions were infiltrated with 20 mL of local anesthesia.  The patient was awakened and taken to recovery in good condition.  Sponge and needles were counted and reported as correct.      Amrita Roche MD

## 2022-03-07 ENCOUNTER — TRANSCRIBE ORDERS (OUTPATIENT)
Dept: OBSTETRICS AND GYNECOLOGY | Facility: HOSPITAL | Age: 32
End: 2022-03-07

## 2022-03-07 DIAGNOSIS — O99.210 OBESITY IN PREGNANCY, ANTEPARTUM: ICD-10-CM

## 2022-03-07 DIAGNOSIS — O28.3 ABNORMAL FETAL ULTRASOUND: ICD-10-CM

## 2022-03-07 DIAGNOSIS — Z98.84 PERSONAL HISTORY OF GASTRIC BYPASS: ICD-10-CM

## 2022-03-07 DIAGNOSIS — O35.8XX0: Primary | ICD-10-CM

## 2022-03-08 ENCOUNTER — OFFICE VISIT (OUTPATIENT)
Dept: OBSTETRICS AND GYNECOLOGY | Facility: HOSPITAL | Age: 32
End: 2022-03-08

## 2022-03-08 ENCOUNTER — HOSPITAL ENCOUNTER (OUTPATIENT)
Dept: WOMENS IMAGING | Facility: HOSPITAL | Age: 32
Discharge: HOME | End: 2022-03-08
Admitting: OBSTETRICS & GYNECOLOGY

## 2022-03-08 VITALS
WEIGHT: 216.4 LBS | DIASTOLIC BLOOD PRESSURE: 60 MMHG | BODY MASS INDEX: 36.06 KG/M2 | HEIGHT: 65 IN | SYSTOLIC BLOOD PRESSURE: 88 MMHG

## 2022-03-08 DIAGNOSIS — O35.8XX0 FETAL SACROCOCCYGEAL TERATOMA AFFECTING ANTEPARTUM CARE OF MOTHER, SINGLE OR UNSPECIFIED FETUS: Primary | ICD-10-CM

## 2022-03-08 DIAGNOSIS — O35.8XX0: ICD-10-CM

## 2022-03-08 DIAGNOSIS — Z34.90 PREGNANCY, UNSPECIFIED GESTATIONAL AGE: ICD-10-CM

## 2022-03-08 DIAGNOSIS — Z98.84 STATUS POST BARIATRIC SURGERY: ICD-10-CM

## 2022-03-08 DIAGNOSIS — Z98.84 PERSONAL HISTORY OF GASTRIC BYPASS: ICD-10-CM

## 2022-03-08 DIAGNOSIS — O99.210 OBESITY IN PREGNANCY, ANTEPARTUM: ICD-10-CM

## 2022-03-08 DIAGNOSIS — O28.3 ABNORMAL FETAL ULTRASOUND: ICD-10-CM

## 2022-03-08 PROCEDURE — 76811 OB US DETAILED SNGL FETUS: CPT | Performed by: OBSTETRICS & GYNECOLOGY

## 2022-03-08 PROCEDURE — 99203 OFFICE O/P NEW LOW 30 MIN: CPT | Performed by: OBSTETRICS & GYNECOLOGY

## 2022-03-08 PROCEDURE — 76811 OB US DETAILED SNGL FETUS: CPT

## 2022-03-08 RX ORDER — PROMETHAZINE HYDROCHLORIDE 12.5 MG/1
TABLET ORAL 3 TIMES DAILY PRN
COMMUNITY
Start: 2022-02-17 | End: 2022-05-26

## 2022-03-08 RX ORDER — ONDANSETRON 4 MG/1
TABLET, FILM COATED ORAL
COMMUNITY
Start: 2022-02-17 | End: 2022-03-08 | Stop reason: SDUPTHER

## 2022-03-08 RX ORDER — OMEPRAZOLE 10 MG/1
CAPSULE, DELAYED RELEASE ORAL
COMMUNITY
End: 2022-07-05

## 2022-03-15 ENCOUNTER — APPOINTMENT (OUTPATIENT)
Dept: WOMENS IMAGING | Facility: HOSPITAL | Age: 32
End: 2022-03-15

## 2022-03-29 ENCOUNTER — OFFICE VISIT (OUTPATIENT)
Dept: OBSTETRICS AND GYNECOLOGY | Facility: HOSPITAL | Age: 32
End: 2022-03-29

## 2022-03-29 ENCOUNTER — HOSPITAL ENCOUNTER (OUTPATIENT)
Dept: WOMENS IMAGING | Facility: HOSPITAL | Age: 32
Discharge: HOME | End: 2022-03-29
Admitting: OBSTETRICS & GYNECOLOGY

## 2022-03-29 VITALS — DIASTOLIC BLOOD PRESSURE: 57 MMHG | WEIGHT: 216 LBS | BODY MASS INDEX: 35.94 KG/M2 | SYSTOLIC BLOOD PRESSURE: 96 MMHG

## 2022-03-29 DIAGNOSIS — Z98.84 STATUS POST BARIATRIC SURGERY: ICD-10-CM

## 2022-03-29 DIAGNOSIS — O35.8XX0 FETAL SACROCOCCYGEAL TERATOMA AFFECTING ANTEPARTUM CARE OF MOTHER, SINGLE OR UNSPECIFIED FETUS: ICD-10-CM

## 2022-03-29 DIAGNOSIS — O35.8XX0 FETAL SACROCOCCYGEAL TERATOMA AFFECTING ANTEPARTUM CARE OF MOTHER, SINGLE OR UNSPECIFIED FETUS: Primary | ICD-10-CM

## 2022-03-29 DIAGNOSIS — Z34.90 PREGNANCY, UNSPECIFIED GESTATIONAL AGE: ICD-10-CM

## 2022-03-29 PROCEDURE — 76816 OB US FOLLOW-UP PER FETUS: CPT | Performed by: OBSTETRICS & GYNECOLOGY

## 2022-03-29 PROCEDURE — 76816 OB US FOLLOW-UP PER FETUS: CPT

## 2022-03-29 NOTE — PROGRESS NOTES
Patient seen in Maternal Fetal Medicine clinic today. Please see full note in under imaging tab of patient chart in Epic (Viewpoint report).    Rena Pinzon MD

## 2022-04-12 ENCOUNTER — HOSPITAL ENCOUNTER (OUTPATIENT)
Dept: WOMENS IMAGING | Facility: HOSPITAL | Age: 32
Discharge: HOME | End: 2022-04-12
Admitting: OBSTETRICS & GYNECOLOGY

## 2022-04-12 ENCOUNTER — OFFICE VISIT (OUTPATIENT)
Dept: OBSTETRICS AND GYNECOLOGY | Facility: HOSPITAL | Age: 32
End: 2022-04-12

## 2022-04-12 VITALS — SYSTOLIC BLOOD PRESSURE: 103 MMHG | DIASTOLIC BLOOD PRESSURE: 61 MMHG | BODY MASS INDEX: 36.28 KG/M2 | WEIGHT: 218 LBS

## 2022-04-12 DIAGNOSIS — Z98.84 STATUS POST BARIATRIC SURGERY: ICD-10-CM

## 2022-04-12 DIAGNOSIS — Z3A.22 22 WEEKS GESTATION OF PREGNANCY: ICD-10-CM

## 2022-04-12 DIAGNOSIS — O35.8XX0 FETAL SACROCOCCYGEAL TERATOMA AFFECTING ANTEPARTUM CARE OF MOTHER, SINGLE OR UNSPECIFIED FETUS: Primary | ICD-10-CM

## 2022-04-12 DIAGNOSIS — O35.8XX0 FETAL SACROCOCCYGEAL TERATOMA AFFECTING ANTEPARTUM CARE OF MOTHER, SINGLE OR UNSPECIFIED FETUS: ICD-10-CM

## 2022-04-12 PROCEDURE — 76815 OB US LIMITED FETUS(S): CPT | Performed by: OBSTETRICS & GYNECOLOGY

## 2022-04-12 PROCEDURE — 76815 OB US LIMITED FETUS(S): CPT

## 2022-04-25 ENCOUNTER — OFFICE VISIT (OUTPATIENT)
Dept: OBSTETRICS AND GYNECOLOGY | Facility: HOSPITAL | Age: 32
End: 2022-04-25

## 2022-04-25 ENCOUNTER — HOSPITAL ENCOUNTER (OUTPATIENT)
Dept: WOMENS IMAGING | Facility: HOSPITAL | Age: 32
Discharge: HOME | End: 2022-04-25
Admitting: OBSTETRICS & GYNECOLOGY

## 2022-04-25 VITALS — BODY MASS INDEX: 36.28 KG/M2 | DIASTOLIC BLOOD PRESSURE: 57 MMHG | SYSTOLIC BLOOD PRESSURE: 100 MMHG | WEIGHT: 218 LBS

## 2022-04-25 DIAGNOSIS — O35.8XX0 FETAL SACROCOCCYGEAL TERATOMA AFFECTING ANTEPARTUM CARE OF MOTHER, SINGLE OR UNSPECIFIED FETUS: ICD-10-CM

## 2022-04-25 DIAGNOSIS — Z3A.22 22 WEEKS GESTATION OF PREGNANCY: ICD-10-CM

## 2022-04-25 DIAGNOSIS — Z98.84 STATUS POST BARIATRIC SURGERY: ICD-10-CM

## 2022-04-25 DIAGNOSIS — Z34.90 PREGNANCY, UNSPECIFIED GESTATIONAL AGE: Primary | ICD-10-CM

## 2022-04-25 PROCEDURE — 76816 OB US FOLLOW-UP PER FETUS: CPT | Performed by: OBSTETRICS & GYNECOLOGY

## 2022-04-25 PROCEDURE — 76816 OB US FOLLOW-UP PER FETUS: CPT

## 2022-05-03 ENCOUNTER — APPOINTMENT (OUTPATIENT)
Dept: WOMENS IMAGING | Facility: HOSPITAL | Age: 32
End: 2022-05-03

## 2022-05-03 ENCOUNTER — TELEPHONE (OUTPATIENT)
Dept: OBSTETRICS AND GYNECOLOGY | Facility: HOSPITAL | Age: 32
End: 2022-05-03

## 2022-05-05 NOTE — TELEPHONE ENCOUNTER
PT was seen at [facility] and was advised that they would need an iron infusion 3 times a week.  PT  called today to see if this is something that can be done here at [facility] in [place].  Can we help this happen? Please contact  to let him know what they would need, or if this is even a possibility.  Thank you.  
Returned call from patient's . Patient has not given consent to discuss her care with anyone so he provided information to me. Dr. Hoff in [place] has prescribed iron infusions 3x/week for 5 doses and gave the patient a written prescription in hopes of arranging the infusions here @ [facility]. Advised him I will call the patient to discuss. He stated they are still seeing her primary OB but will have future care in [place] with plan for delivery there.     Spoke with patient. Informed her that I have left a message with our Outpatient Infusion Center and will call her back tomorrow when I have more information. She v/u and agrees.   
Spoke with [facility]. Was told they can only accept orders from a physician that has privileges at this hospital so they would not be able to accept the order from Dr. Hoff in [place].   Spoke with patient. She will send us a copy of the order from Dr. Hoff via My Chart so our physician can provide the order.   
Spoke with patient. Informed her that Dr. Milligan ordered the iron infusions and she is scheduled for the 1st infusion 5/13/22 @ 2:30 pm in the [facility] (Phone # [phone]). She v/u and agrees.   
(4) no limitation

## 2022-05-12 ENCOUNTER — TELEPHONE (OUTPATIENT)
Dept: OBSTETRICS AND GYNECOLOGY | Facility: HOSPITAL | Age: 32
End: 2022-05-12

## 2022-05-12 PROBLEM — D64.9 ANEMIA: Status: ACTIVE | Noted: 2022-05-12

## 2022-05-12 RX ORDER — ACETAMINOPHEN 325 MG/1
650 TABLET ORAL EVERY 6 HOURS PRN
Qty: 2 TABLET | Refills: 0 | Status: SHIPPED | OUTPATIENT
Start: 2022-05-12 | End: 2022-08-16

## 2022-05-12 NOTE — TELEPHONE ENCOUNTER
Rashida from [facility], wanted to see if she could get some clarification on the orders for Ms. Anthony.

## 2022-05-13 ENCOUNTER — HOSPITAL ENCOUNTER (OUTPATIENT)
Dept: ONCOLOGY | Facility: HOSPITAL | Age: 32
Setting detail: INFUSION SERIES
Discharge: HOME | End: 2022-05-13

## 2022-05-16 ENCOUNTER — APPOINTMENT (OUTPATIENT)
Dept: ONCOLOGY | Facility: HOSPITAL | Age: 32
End: 2022-05-16

## 2022-05-18 ENCOUNTER — APPOINTMENT (OUTPATIENT)
Dept: ONCOLOGY | Facility: HOSPITAL | Age: 32
End: 2022-05-18

## 2022-05-20 ENCOUNTER — APPOINTMENT (OUTPATIENT)
Dept: ONCOLOGY | Facility: HOSPITAL | Age: 32
End: 2022-05-20

## 2022-05-23 ENCOUNTER — APPOINTMENT (OUTPATIENT)
Dept: ONCOLOGY | Facility: HOSPITAL | Age: 32
End: 2022-05-23

## 2022-05-26 ENCOUNTER — POSTPARTUM VISIT (OUTPATIENT)
Dept: OBSTETRICS AND GYNECOLOGY | Facility: HOSPITAL | Age: 32
End: 2022-05-26

## 2022-05-26 VITALS
DIASTOLIC BLOOD PRESSURE: 70 MMHG | WEIGHT: 212.6 LBS | BODY MASS INDEX: 35.38 KG/M2 | SYSTOLIC BLOOD PRESSURE: 128 MMHG | HEART RATE: 58 BPM

## 2022-05-26 DIAGNOSIS — Z98.891 STATUS POST CESAREAN SECTION: Primary | ICD-10-CM

## 2022-05-26 PROCEDURE — 99212 OFFICE O/P EST SF 10 MIN: CPT | Performed by: OBSTETRICS & GYNECOLOGY

## 2022-05-26 RX ORDER — OXYCODONE HYDROCHLORIDE 5 MG/1
5 TABLET ORAL
COMMUNITY
Start: 2022-05-22 | End: 2022-05-27

## 2022-05-26 RX ORDER — IBUPROFEN 600 MG/1
1 TABLET ORAL EVERY 6 HOURS
COMMUNITY
Start: 2022-05-22 | End: 2022-08-16

## 2022-05-26 RX ORDER — FLUOXETINE HYDROCHLORIDE 40 MG/1
40 CAPSULE ORAL 2 TIMES DAILY
COMMUNITY
Start: 2022-03-17

## 2022-05-26 NOTE — PROGRESS NOTES
W/o c/o  VSS AF  Legs nt   lchia scant  Incision healing well  Staples removed, steri-strips applied.    F/U 2 weks

## 2022-05-26 NOTE — PROGRESS NOTES
Tearful; appropriately sad in light of  demise. Reports light lochia. Incision without redness or drainage. Staples removed & steri-strips and Mastisol applied. Patient tolerated well. Discussed  Bereavement Program resources and patient would like to be contacted; left phone message for  to contact patient.

## 2022-06-02 ENCOUNTER — TELEPHONE (OUTPATIENT)
Dept: LABOR AND DELIVERY | Facility: HOSPITAL | Age: 32
End: 2022-06-02

## 2022-06-08 ENCOUNTER — POSTPARTUM VISIT (OUTPATIENT)
Dept: OBSTETRICS AND GYNECOLOGY | Facility: HOSPITAL | Age: 32
End: 2022-06-08

## 2022-06-08 VITALS
BODY MASS INDEX: 31.48 KG/M2 | WEIGHT: 189.2 LBS | HEART RATE: 78 BPM | SYSTOLIC BLOOD PRESSURE: 102 MMHG | DIASTOLIC BLOOD PRESSURE: 57 MMHG

## 2022-06-08 DIAGNOSIS — O35.8XX0 FETAL SACROCOCCYGEAL TERATOMA AFFECTING ANTEPARTUM CARE OF MOTHER, SINGLE OR UNSPECIFIED FETUS: Primary | ICD-10-CM

## 2022-06-08 PROCEDURE — 99213 OFFICE O/P EST LOW 20 MIN: CPT | Performed by: OBSTETRICS & GYNECOLOGY

## 2022-06-08 NOTE — TELEPHONE ENCOUNTER
Spoke with Riana regarding loss of baby girl Brittany after request of ANTONINO Urbina RN.  We discussed her pregnancy and delivery.  She states she is busy with three boys who are 7, 10 and 11 and she is glad to be busy.  States physically doing well now.  We discussed support of family and friends.  She has lived in [place] for 14 years and 's family is here and she has support of friends.  She states she feels she is doing well and always able to talk with others about Brittany and knows she will never forget her.  Was in ICU after delivery and didn't get to see Brittany before burial but  was at  and she and boys were recently able to go to grave sight at [facility] to say helhomero.  We discussed differences in the way women and men may grieve and express their grief.  I mentioned support group and [facility] services.  Riana states it is OK to email support group information and fall memorial service invitations.  Per her request Counseling information and support group schedule emailed after conversation.  Riana encouraged to contact PB program if she has questions or would like to talk in the future.

## 2022-06-08 NOTE — PROGRESS NOTES
"          POST-OP INCISION AND BP CHECK     Referring Provider:  Wendy García     CC: post op s/p PCS, fetal sacrococcygeal teratoma, maternal mirror syndrome,  demise     HPI:   Post op x 3 weeks s/p PCS (vertical skin incision) in the setting of worsening fetal hydrops, known fetal sacrococcygeal teratoma, maternal mirror syndrome,  demise. Patient was admitted to ICU. Currently feels well physically and is coping with loss appropriately. No pain. Minimal lochia.   No contraception plans   Would like a \"scar cream\"   No bowel or bladder complaints     Past medical, surgical, OB and social history reviewed and no changes noted.     ROS: 14 point review of systems reviewed. Pertinent positives and negatives reviewed in HPI. All other systems are negative.      Exam:   102/57   Gen: no apparent distress   HEENT: normocephalic, atraumatic   Neuro: no focal deficits appreciated   Skin: no rashes or lesions visible.   Pulm: Normal effort   Psych: Alert, oriented   Abdomen: nontender, obese. Inc: clean, dry, intact      A/P:  Post op x 3 weeks s/p PCS (vertical skin incision) in the setting of worsening fetal hydrops, known fetal sacrococcygeal teratoma, maternal mirror syndrome,  demise.  Recovering well   Reviewed scar care: hydrating lotion and sunscreen. Discussed other options though not particularly data driven. Will rx for ScarAway at patient request.   Follow up with Primary OB for 6 week post op check   Further visits with MFM as needed         Rena Pinzon MD  Maternal Fetal Medicine     I spent a total time of     minutes with this patient of which    minutes was face-to-face counseling and coordination of care. Questions asked by the patient were answered.  "

## 2022-06-08 NOTE — PROGRESS NOTES
Denies problems. States phone call from  Bereavement program here was helpful. Incision well healed; steri-strips have fallen off. Still with light lochia.

## 2022-07-02 DIAGNOSIS — K21.9 GASTROESOPHAGEAL REFLUX DISEASE, UNSPECIFIED WHETHER ESOPHAGITIS PRESENT: ICD-10-CM

## 2022-07-02 DIAGNOSIS — R12 HEARTBURN: ICD-10-CM

## 2022-07-02 DIAGNOSIS — R14.0 GENERALIZED BLOATING: ICD-10-CM

## 2022-07-05 RX ORDER — OMEPRAZOLE 40 MG/1
CAPSULE, DELAYED RELEASE ORAL
Qty: 30 CAPSULE | Refills: 5 | Status: SHIPPED | OUTPATIENT
Start: 2022-07-05

## 2022-07-30 ENCOUNTER — HOSPITAL ENCOUNTER (EMERGENCY)
Facility: HOSPITAL | Age: 32
Discharge: HOME | End: 2022-07-30
Attending: EMERGENCY MEDICINE | Admitting: EMERGENCY MEDICINE

## 2022-07-30 ENCOUNTER — APPOINTMENT (OUTPATIENT)
Dept: CT IMAGING | Facility: HOSPITAL | Age: 32
End: 2022-07-30

## 2022-07-30 VITALS
WEIGHT: 170 LBS | DIASTOLIC BLOOD PRESSURE: 77 MMHG | TEMPERATURE: 98.2 F | RESPIRATION RATE: 18 BRPM | SYSTOLIC BLOOD PRESSURE: 112 MMHG | HEART RATE: 79 BPM | BODY MASS INDEX: 27.32 KG/M2 | HEIGHT: 66 IN | OXYGEN SATURATION: 99 %

## 2022-07-30 DIAGNOSIS — R10.30 LOWER ABDOMINAL PAIN: ICD-10-CM

## 2022-07-30 DIAGNOSIS — N94.6 DYSMENORRHEA: Primary | ICD-10-CM

## 2022-07-30 LAB
ALBUMIN SERPL-MCNC: 4.1 G/DL (ref 3.5–5.2)
ALBUMIN/GLOB SERPL: 1.3 G/DL
ALP SERPL-CCNC: 290 U/L (ref 39–117)
ALT SERPL W P-5'-P-CCNC: 149 U/L (ref 1–33)
ANION GAP SERPL CALCULATED.3IONS-SCNC: 10 MMOL/L (ref 5–15)
AST SERPL-CCNC: 146 U/L (ref 1–32)
B-HCG UR QL: NEGATIVE
BACTERIA UR QL AUTO: ABNORMAL /HPF
BASOPHILS # BLD AUTO: 0.04 10*3/MM3 (ref 0–0.2)
BASOPHILS NFR BLD AUTO: 0.8 % (ref 0–1.5)
BILIRUB SERPL-MCNC: 0.4 MG/DL (ref 0–1.2)
BILIRUB UR QL STRIP: NEGATIVE
BUN SERPL-MCNC: 9 MG/DL (ref 6–20)
BUN/CREAT SERPL: 12.2 (ref 7–25)
CALCIUM SPEC-SCNC: 9.4 MG/DL (ref 8.6–10.5)
CHLORIDE SERPL-SCNC: 98 MMOL/L (ref 98–107)
CLARITY UR: CLEAR
CO2 SERPL-SCNC: 25 MMOL/L (ref 22–29)
COLOR UR: YELLOW
CREAT SERPL-MCNC: 0.74 MG/DL (ref 0.57–1)
DEPRECATED RDW RBC AUTO: 55.3 FL (ref 37–54)
EGFRCR SERPLBLD CKD-EPI 2021: 111.1 ML/MIN/1.73
EOSINOPHIL # BLD AUTO: 0.12 10*3/MM3 (ref 0–0.4)
EOSINOPHIL NFR BLD AUTO: 2.3 % (ref 0.3–6.2)
ERYTHROCYTE [DISTWIDTH] IN BLOOD BY AUTOMATED COUNT: 17.9 % (ref 12.3–15.4)
EXPIRATION DATE: NORMAL
GLOBULIN UR ELPH-MCNC: 3.1 GM/DL
GLUCOSE SERPL-MCNC: 93 MG/DL (ref 65–99)
GLUCOSE UR STRIP-MCNC: NEGATIVE MG/DL
HAV IGM SERPL QL IA: NORMAL
HBV CORE IGM SERPL QL IA: NORMAL
HBV SURFACE AG SERPL QL IA: NORMAL
HCT VFR BLD AUTO: 36.8 % (ref 34–46.6)
HCV AB SER DONR QL: NORMAL
HGB BLD-MCNC: 12 G/DL (ref 12–15.9)
HGB UR QL STRIP.AUTO: ABNORMAL
HOLD SPECIMEN: NORMAL
HYALINE CASTS UR QL AUTO: ABNORMAL /LPF
IMM GRANULOCYTES # BLD AUTO: 0.01 10*3/MM3 (ref 0–0.05)
IMM GRANULOCYTES NFR BLD AUTO: 0.2 % (ref 0–0.5)
INTERNAL NEGATIVE CONTROL: NORMAL
INTERNAL POSITIVE CONTROL: NORMAL
KETONES UR QL STRIP: ABNORMAL
LEUKOCYTE ESTERASE UR QL STRIP.AUTO: ABNORMAL
LIPASE SERPL-CCNC: 20 U/L (ref 13–60)
LYMPHOCYTES # BLD AUTO: 1.2 10*3/MM3 (ref 0.7–3.1)
LYMPHOCYTES NFR BLD AUTO: 23 % (ref 19.6–45.3)
Lab: NORMAL
MCH RBC QN AUTO: 27.8 PG (ref 26.6–33)
MCHC RBC AUTO-ENTMCNC: 32.6 G/DL (ref 31.5–35.7)
MCV RBC AUTO: 85.2 FL (ref 79–97)
MONOCYTES # BLD AUTO: 0.42 10*3/MM3 (ref 0.1–0.9)
MONOCYTES NFR BLD AUTO: 8 % (ref 5–12)
NEUTROPHILS NFR BLD AUTO: 3.43 10*3/MM3 (ref 1.7–7)
NEUTROPHILS NFR BLD AUTO: 65.7 % (ref 42.7–76)
NITRITE UR QL STRIP: NEGATIVE
NRBC BLD AUTO-RTO: 0 /100 WBC (ref 0–0.2)
PH UR STRIP.AUTO: 8 [PH] (ref 5–8)
PLATELET # BLD AUTO: 344 10*3/MM3 (ref 140–450)
PMV BLD AUTO: 9.7 FL (ref 6–12)
POTASSIUM SERPL-SCNC: 3.9 MMOL/L (ref 3.5–5.2)
PROT SERPL-MCNC: 7.2 G/DL (ref 6–8.5)
PROT UR QL STRIP: ABNORMAL
RBC # BLD AUTO: 4.32 10*6/MM3 (ref 3.77–5.28)
RBC # UR STRIP: ABNORMAL /HPF
REF LAB TEST METHOD: ABNORMAL
SODIUM SERPL-SCNC: 133 MMOL/L (ref 136–145)
SP GR UR STRIP: 1.02 (ref 1–1.03)
SQUAMOUS #/AREA URNS HPF: ABNORMAL /HPF
UROBILINOGEN UR QL STRIP: ABNORMAL
WBC # UR STRIP: ABNORMAL /HPF
WBC NRBC COR # BLD: 5.22 10*3/MM3 (ref 3.4–10.8)
WHOLE BLOOD HOLD COAG: NORMAL
WHOLE BLOOD HOLD SPECIMEN: NORMAL

## 2022-07-30 PROCEDURE — 25010000002 MORPHINE PER 10 MG: Performed by: EMERGENCY MEDICINE

## 2022-07-30 PROCEDURE — 36415 COLL VENOUS BLD VENIPUNCTURE: CPT

## 2022-07-30 PROCEDURE — 96374 THER/PROPH/DIAG INJ IV PUSH: CPT

## 2022-07-30 PROCEDURE — 83690 ASSAY OF LIPASE: CPT

## 2022-07-30 PROCEDURE — 81025 URINE PREGNANCY TEST: CPT

## 2022-07-30 PROCEDURE — 99283 EMERGENCY DEPT VISIT LOW MDM: CPT

## 2022-07-30 PROCEDURE — 25010000002 IOPAMIDOL 61 % SOLUTION: Performed by: EMERGENCY MEDICINE

## 2022-07-30 PROCEDURE — 74177 CT ABD & PELVIS W/CONTRAST: CPT

## 2022-07-30 PROCEDURE — 80074 ACUTE HEPATITIS PANEL: CPT | Performed by: EMERGENCY MEDICINE

## 2022-07-30 PROCEDURE — 81001 URINALYSIS AUTO W/SCOPE: CPT

## 2022-07-30 PROCEDURE — 25010000002 ONDANSETRON PER 1 MG: Performed by: EMERGENCY MEDICINE

## 2022-07-30 PROCEDURE — 80053 COMPREHEN METABOLIC PANEL: CPT

## 2022-07-30 PROCEDURE — 85025 COMPLETE CBC W/AUTO DIFF WBC: CPT

## 2022-07-30 PROCEDURE — 96375 TX/PRO/DX INJ NEW DRUG ADDON: CPT

## 2022-07-30 RX ORDER — SODIUM CHLORIDE 9 MG/ML
10 INJECTION INTRAVENOUS AS NEEDED
Status: DISCONTINUED | OUTPATIENT
Start: 2022-07-30 | End: 2022-07-30 | Stop reason: HOSPADM

## 2022-07-30 RX ORDER — DICLOFENAC SODIUM 75 MG/1
75 TABLET, DELAYED RELEASE ORAL 2 TIMES DAILY
Qty: 14 TABLET | Refills: 0 | Status: SHIPPED | OUTPATIENT
Start: 2022-07-30 | End: 2022-07-31 | Stop reason: SDUPTHER

## 2022-07-30 RX ORDER — OXYCODONE HYDROCHLORIDE 5 MG/1
5 TABLET ORAL EVERY 4 HOURS PRN
Qty: 12 TABLET | Refills: 0 | Status: SHIPPED | OUTPATIENT
Start: 2022-07-30 | End: 2022-07-31 | Stop reason: SDUPTHER

## 2022-07-30 RX ORDER — MORPHINE SULFATE 4 MG/ML
4 INJECTION, SOLUTION INTRAMUSCULAR; INTRAVENOUS ONCE
Status: COMPLETED | OUTPATIENT
Start: 2022-07-30 | End: 2022-07-30

## 2022-07-30 RX ORDER — ONDANSETRON 2 MG/ML
4 INJECTION INTRAMUSCULAR; INTRAVENOUS ONCE
Status: COMPLETED | OUTPATIENT
Start: 2022-07-30 | End: 2022-07-30

## 2022-07-30 RX ADMIN — ONDANSETRON 4 MG: 2 INJECTION INTRAMUSCULAR; INTRAVENOUS at 17:49

## 2022-07-30 RX ADMIN — IOPAMIDOL 85 ML: 612 INJECTION, SOLUTION INTRAVENOUS at 17:40

## 2022-07-30 RX ADMIN — MORPHINE SULFATE 4 MG: 4 INJECTION, SOLUTION INTRAMUSCULAR; INTRAVENOUS at 17:48

## 2022-07-31 RX ORDER — OXYCODONE HYDROCHLORIDE 5 MG/1
5 TABLET ORAL EVERY 4 HOURS PRN
Qty: 12 TABLET | Refills: 0 | Status: SHIPPED | OUTPATIENT
Start: 2022-07-31 | End: 2022-08-16

## 2022-07-31 RX ORDER — DICLOFENAC SODIUM 75 MG/1
75 TABLET, DELAYED RELEASE ORAL 2 TIMES DAILY
Qty: 14 TABLET | Refills: 0 | Status: SHIPPED | OUTPATIENT
Start: 2022-07-31 | End: 2022-08-16

## 2022-08-01 ENCOUNTER — PATIENT OUTREACH (OUTPATIENT)
Dept: CASE MANAGEMENT | Facility: OTHER | Age: 32
End: 2022-08-01

## 2022-08-01 DIAGNOSIS — K42.9 UMBILICAL HERNIA WITHOUT OBSTRUCTION AND WITHOUT GANGRENE: Primary | ICD-10-CM

## 2022-08-01 NOTE — OUTREACH NOTE
"AMBULATORY CASE MANAGEMENT NOTE    Name and Relationship of Patient/Support Person: Riana Anthony Elmiralauren - Self    Patient Outreach    Pt contacted regarding [facility] ED visit 7/30/22 with chief c/o listed as abd pain.  Role of Ambulatory Nurse  explained.  States doing \"good.  Doing a lot better.\"  She was unable to obtain her medicines over the weekend, but did obtain today. Offered to assist in scheduling f/u with her PCP,Alie PEREZ, however states she is following up with OB-GYN and Bariatrics and does not feel she needs to see PCP at present. She has referral in place for general surgery for umbilical hernia consult.  [facility] 24/7 Nurse Call Center explained and number provided.  Discussed annual physical at later date.  She lives with her spouse and 3 boys.  She is self sufficient and drives herself.  She denies any needs at present and voiced her appreciation for the call.     SDOH updated and reviewed with the patient during this program:  Financial Resource Strain: Low Risk    • Difficulty of Paying Living Expenses: Not hard at all      Food Insecurity: No Food Insecurity   • Worried About Running Out of Food in the Last Year: Never true   • Ran Out of Food in the Last Year: Never true      Transportation Needs: No Transportation Needs   • Lack of Transportation (Medical): No   • Lack of Transportation (Non-Medical): No      Housing Stability: Low Risk    • Unable to Pay for Housing in the Last Year: No   • Number of Places Lived in the Last Year: 1   • Unstable Housing in the Last Year: No       RADHA WATERS  Ambulatory Case Management    8/1/2022, 15:52 EDT  "

## 2022-08-16 ENCOUNTER — OFFICE VISIT (OUTPATIENT)
Dept: BARIATRICS/WEIGHT MGMT | Facility: CLINIC | Age: 32
End: 2022-08-16

## 2022-08-16 VITALS
SYSTOLIC BLOOD PRESSURE: 114 MMHG | DIASTOLIC BLOOD PRESSURE: 72 MMHG | HEIGHT: 66 IN | OXYGEN SATURATION: 99 % | WEIGHT: 176 LBS | RESPIRATION RATE: 18 BRPM | HEART RATE: 100 BPM | TEMPERATURE: 97.7 F | BODY MASS INDEX: 28.28 KG/M2

## 2022-08-16 DIAGNOSIS — K21.9 GASTROESOPHAGEAL REFLUX DISEASE, UNSPECIFIED WHETHER ESOPHAGITIS PRESENT: Primary | ICD-10-CM

## 2022-08-16 PROCEDURE — 99214 OFFICE O/P EST MOD 30 MIN: CPT | Performed by: PHYSICIAN ASSISTANT

## 2022-08-16 RX ORDER — FAMOTIDINE 40 MG/1
40 TABLET, FILM COATED ORAL 2 TIMES DAILY
Qty: 180 TABLET | Refills: 1 | Status: SHIPPED | OUTPATIENT
Start: 2022-08-16 | End: 2022-09-01

## 2022-09-01 ENCOUNTER — PRE-ADMISSION TESTING (OUTPATIENT)
Dept: PREADMISSION TESTING | Facility: HOSPITAL | Age: 32
End: 2022-09-01

## 2022-09-01 VITALS — HEIGHT: 66 IN | WEIGHT: 179.45 LBS | BODY MASS INDEX: 28.84 KG/M2

## 2022-09-01 LAB
ANION GAP SERPL CALCULATED.3IONS-SCNC: 10 MMOL/L (ref 5–15)
BUN SERPL-MCNC: 9 MG/DL (ref 6–20)
BUN/CREAT SERPL: 11.4 (ref 7–25)
CALCIUM SPEC-SCNC: 10.5 MG/DL (ref 8.6–10.5)
CHLORIDE SERPL-SCNC: 102 MMOL/L (ref 98–107)
CO2 SERPL-SCNC: 28 MMOL/L (ref 22–29)
CREAT SERPL-MCNC: 0.79 MG/DL (ref 0.57–1)
DEPRECATED RDW RBC AUTO: 46 FL (ref 37–54)
EGFRCR SERPLBLD CKD-EPI 2021: 102.7 ML/MIN/1.73
ERYTHROCYTE [DISTWIDTH] IN BLOOD BY AUTOMATED COUNT: 14.5 % (ref 12.3–15.4)
GLUCOSE SERPL-MCNC: 78 MG/DL (ref 65–99)
HBA1C MFR BLD: 4.7 % (ref 4.8–5.6)
HCT VFR BLD AUTO: 36.7 % (ref 34–46.6)
HGB BLD-MCNC: 12.3 G/DL (ref 12–15.9)
MCH RBC QN AUTO: 29.7 PG (ref 26.6–33)
MCHC RBC AUTO-ENTMCNC: 33.5 G/DL (ref 31.5–35.7)
MCV RBC AUTO: 88.6 FL (ref 79–97)
PLATELET # BLD AUTO: 399 10*3/MM3 (ref 140–450)
PMV BLD AUTO: 9.6 FL (ref 6–12)
POTASSIUM SERPL-SCNC: 4.6 MMOL/L (ref 3.5–5.2)
RBC # BLD AUTO: 4.14 10*6/MM3 (ref 3.77–5.28)
SODIUM SERPL-SCNC: 140 MMOL/L (ref 136–145)
WBC NRBC COR # BLD: 8.56 10*3/MM3 (ref 3.4–10.8)

## 2022-09-01 PROCEDURE — 80048 BASIC METABOLIC PNL TOTAL CA: CPT

## 2022-09-01 PROCEDURE — 36415 COLL VENOUS BLD VENIPUNCTURE: CPT

## 2022-09-01 PROCEDURE — 83036 HEMOGLOBIN GLYCOSYLATED A1C: CPT

## 2022-09-01 PROCEDURE — 85027 COMPLETE CBC AUTOMATED: CPT

## 2022-09-01 RX ORDER — DIPHENOXYLATE HYDROCHLORIDE AND ATROPINE SULFATE 2.5; .025 MG/1; MG/1
TABLET ORAL DAILY
COMMUNITY

## 2022-09-01 NOTE — PAT
An arrival time for procedure was not provided during PAT visit. If patient had any questions or concerns about their arrival time, they were instructed to contact their surgeon/physician.  Additionally, if the patient referred to an arrival time that was acquired from their my chart account, patient was encouraged to verify that time with their surgeon/physician. Arrival times are NOT provided in Pre Admission Testing Department.    Patient to apply Chlorhexadine wipes  to surgical area (as instructed) the night before procedure and the AM of procedure. Wipes provided.    Patient instructed to drink 20 ounces of Gatorade and it needs to be completed 1 hour (for Main OR patients) or 2 hours (scheduled  section & [facility] patients) before given arrival time for procedure (NO RED Gatorade)    Patient verbalized understanding.    Patient denies any current skin issues.     Patient viewed general PAT education video as instructed in their preoperative information received from their surgeon.  Patient stated the general PAT education video was viewed in its entirety and survey completed.  Copies of [facility] general education handouts (Incentive Spirometry, Meds to Beds Program, Patient Belongings, Pre-op skin preparation instructions, Blood Glucose testing, Visitor policy, Surgery FAQ, Code H) distributed to patient if not printed. Education related to the PAT pass and skin preparation for surgery (if applicable) completed in PAT as a reinforcement to PAT education video. Patient instructed to return PAT pass provided today as well as completed skin preparation sheet (if applicable) on the day of procedure.     Additionally if patient had not viewed video yet but intended to view it at home or in our waiting area, then referred them to the handout with QR code/link provided during PAT visit.  Instructed patient to complete survey after viewing the video in its entirety.  Encouraged patient/family to read [facility] general  education handouts thoroughly and notify PAT staff with any questions or concerns. Patient verbalized understanding of all information and priority content.

## 2022-09-01 NOTE — DISCHARGE INSTRUCTIONS
The following information and instructions were given:    Do not eat, drink, smoke or chew gum after midnight the night before surgery. This includes no mints.  Take all routine, prescribed medications including heart and blood pressure medicines with a sip of water unless otherwise instructed by your physician.   Do NOT take diabetic medication unless instructed by your physician.    DO NOT shave for two days before your procedure.  Do not wear makeup.      DO NOT wear fingernail polish (gel/regular) and/or acrylic/artificial nails on the day of surgery. If you had a recent manicure and would rather not remove polish or artificial nails, the minimum requirement is that the polish/artificial nails must be removed from the middle finger on each hand.      If you are having surgery/procedure on an upper extremity, fingernail polish/artificial fingernails must be removed for surgery.  NO EXCEPTIONS.      If you are having surgery/procedure on a lower extremity, toenail polish on both extremities must be removed for surgery.  NO EXCEPTIONS.    Remove all jewelry (advise to go to jeweler if unable to remove).  Jewelry, especially rings, can no longer be taped for surgery.    Leave anything you consider valuable at home.    Leave your suitcase in the car until after your surgery if you are staying overnight.    Bring the following with you the day of your procedure (when applicable):       -Picture ID and insurance cards       -Co-pay/deductible required by insurance       -Medications in the original bottles (not a list) including all over-the-counter medications if not brought to PAT       -Copy of advance directive, living will or power of  documents if not brought to PAT       -CPAP or BIPAP mask and tubing (do not bring machine)       -Skin prep instruction(s) sheet       -PAT Pass    Educational handout or binder (joint replacements) related to procedure given to patient.  Educational handout also includes  general information related to the recovery that mentions signs and symptoms of infection and when to call the doctor.    When applicable, an ERAS handout was given to patient.    Respirex use and pneumonia prevention education provided in Pre Admission Testing general education video.    Information related to infection and hand hygiene mentioned in Pre Admission Testing general education video. Patient instructed to call their doctor if any of the following symptoms are noted during recovery:  Fever of 100.4 F or higher, incision that is warm or has increasing bleeding, redness or drainage.    DVT Prevention instructions given in general education video presentation during Pre Admission Testing appointment that stress the importance of ambulation to improve blood circulation.  Also encouraged patient to perform foot exercises when in bed and application of a sequential device may be applied to lower extremities to improve circulation.      Please apply Chlorhexidine wipes to surgical area (if instructed) the night before procedure and the AM of procedure and document date/time of applications on skin prep instruction sheet.

## 2022-09-12 ENCOUNTER — ANESTHESIA EVENT (OUTPATIENT)
Dept: PERIOP | Facility: HOSPITAL | Age: 32
End: 2022-09-12

## 2022-09-12 RX ORDER — FAMOTIDINE 10 MG/ML
20 INJECTION, SOLUTION INTRAVENOUS ONCE
Status: CANCELLED | OUTPATIENT
Start: 2022-09-12 | End: 2022-09-12

## 2022-09-13 ENCOUNTER — ANESTHESIA (OUTPATIENT)
Dept: PERIOP | Facility: HOSPITAL | Age: 32
End: 2022-09-13

## 2022-09-13 ENCOUNTER — HOSPITAL ENCOUNTER (OUTPATIENT)
Facility: HOSPITAL | Age: 32
Discharge: HOME | End: 2022-09-14
Attending: PLASTIC SURGERY | Admitting: PLASTIC SURGERY

## 2022-09-13 ENCOUNTER — ANESTHESIA EVENT CONVERTED (OUTPATIENT)
Dept: ANESTHESIOLOGY | Facility: HOSPITAL | Age: 32
End: 2022-09-13

## 2022-09-13 DIAGNOSIS — K43.9 VENTRAL HERNIA: ICD-10-CM

## 2022-09-13 LAB
B-HCG UR QL: NEGATIVE
EXPIRATION DATE: NORMAL
INTERNAL NEGATIVE CONTROL: NEGATIVE
INTERNAL POSITIVE CONTROL: POSITIVE
Lab: NORMAL

## 2022-09-13 PROCEDURE — 25010000002 ONDANSETRON PER 1 MG: Performed by: PLASTIC SURGERY

## 2022-09-13 PROCEDURE — 25010000002 DEXAMETHASONE PER 1 MG: Performed by: NURSE ANESTHETIST, CERTIFIED REGISTERED

## 2022-09-13 PROCEDURE — 25010000002 CEFAZOLIN IN DEXTROSE 2-4 GM/100ML-% SOLUTION: Performed by: NURSE ANESTHETIST, CERTIFIED REGISTERED

## 2022-09-13 PROCEDURE — C1789 PROSTHESIS, BREAST, IMP: HCPCS | Performed by: PLASTIC SURGERY

## 2022-09-13 PROCEDURE — 81025 URINE PREGNANCY TEST: CPT | Performed by: ANESTHESIOLOGY

## 2022-09-13 PROCEDURE — 25010000002 MORPHINE PER 10 MG: Performed by: PLASTIC SURGERY

## 2022-09-13 PROCEDURE — 63710000001 PROMETHAZINE PER 12.5 MG: Performed by: PLASTIC SURGERY

## 2022-09-13 PROCEDURE — 25010000002 HYDROMORPHONE PER 4 MG: Performed by: NURSE ANESTHETIST, CERTIFIED REGISTERED

## 2022-09-13 PROCEDURE — 25010000002 DEXAMETHASONE SODIUM PHOSPHATE 10 MG/ML SOLUTION: Performed by: NURSE ANESTHETIST, CERTIFIED REGISTERED

## 2022-09-13 PROCEDURE — 25010000002 ONDANSETRON PER 1 MG: Performed by: NURSE ANESTHETIST, CERTIFIED REGISTERED

## 2022-09-13 PROCEDURE — 25010000002 PROPOFOL 10 MG/ML EMULSION: Performed by: NURSE ANESTHETIST, CERTIFIED REGISTERED

## 2022-09-13 PROCEDURE — 25010000002 CEFAZOLIN IN DEXTROSE 2-4 GM/100ML-% SOLUTION: Performed by: PLASTIC SURGERY

## 2022-09-13 PROCEDURE — 25010000002 FENTANYL CITRATE (PF) 50 MCG/ML SOLUTION: Performed by: NURSE ANESTHETIST, CERTIFIED REGISTERED

## 2022-09-13 PROCEDURE — 25010000002 ALBUMIN HUMAN 5% PER 50 ML: Performed by: NURSE ANESTHETIST, CERTIFIED REGISTERED

## 2022-09-13 PROCEDURE — P9041 ALBUMIN (HUMAN),5%, 50ML: HCPCS | Performed by: NURSE ANESTHETIST, CERTIFIED REGISTERED

## 2022-09-13 PROCEDURE — 25010000002 GENTAMICIN PER 80 MG: Performed by: PLASTIC SURGERY

## 2022-09-13 PROCEDURE — 88302 TISSUE EXAM BY PATHOLOGIST: CPT | Performed by: PLASTIC SURGERY

## 2022-09-13 PROCEDURE — 25010000002 CEFAZOLIN PER 500 MG: Performed by: PLASTIC SURGERY

## 2022-09-13 DEVICE — DEV CONTRL TISS STRATAFIX SPIRAL MNCRYL UD 3/0 PLS 60CM: Type: IMPLANTABLE DEVICE | Site: ABDOMEN | Status: FUNCTIONAL

## 2022-09-13 DEVICE — IMPLANTABLE DEVICE: Type: IMPLANTABLE DEVICE | Site: BREAST | Status: FUNCTIONAL

## 2022-09-13 RX ORDER — EPHEDRINE SULFATE 50 MG/ML
INJECTION, SOLUTION INTRAVENOUS AS NEEDED
Status: DISCONTINUED | OUTPATIENT
Start: 2022-09-13 | End: 2022-09-13 | Stop reason: SURG

## 2022-09-13 RX ORDER — MORPHINE SULFATE 2 MG/ML
2 INJECTION, SOLUTION INTRAMUSCULAR; INTRAVENOUS
Status: DISCONTINUED | OUTPATIENT
Start: 2022-09-13 | End: 2022-09-14 | Stop reason: HOSPADM

## 2022-09-13 RX ORDER — CEFAZOLIN SODIUM 2 G/100ML
2 INJECTION, SOLUTION INTRAVENOUS ONCE
Status: COMPLETED | OUTPATIENT
Start: 2022-09-13 | End: 2022-09-13

## 2022-09-13 RX ORDER — DEXAMETHASONE SODIUM PHOSPHATE 4 MG/ML
INJECTION, SOLUTION INTRA-ARTICULAR; INTRALESIONAL; INTRAMUSCULAR; INTRAVENOUS; SOFT TISSUE AS NEEDED
Status: DISCONTINUED | OUTPATIENT
Start: 2022-09-13 | End: 2022-09-13 | Stop reason: SURG

## 2022-09-13 RX ORDER — SODIUM CHLORIDE 0.9 % (FLUSH) 0.9 %
10 SYRINGE (ML) INJECTION AS NEEDED
Status: DISCONTINUED | OUTPATIENT
Start: 2022-09-13 | End: 2022-09-13 | Stop reason: HOSPADM

## 2022-09-13 RX ORDER — NALOXONE HCL 0.4 MG/ML
0.4 VIAL (ML) INJECTION
Status: DISCONTINUED | OUTPATIENT
Start: 2022-09-13 | End: 2022-09-14 | Stop reason: HOSPADM

## 2022-09-13 RX ORDER — PROMETHAZINE HYDROCHLORIDE 12.5 MG/1
6.25 TABLET ORAL EVERY 6 HOURS PRN
Status: DISCONTINUED | OUTPATIENT
Start: 2022-09-13 | End: 2022-09-14 | Stop reason: HOSPADM

## 2022-09-13 RX ORDER — FENTANYL CITRATE 50 UG/ML
INJECTION, SOLUTION INTRAMUSCULAR; INTRAVENOUS AS NEEDED
Status: DISCONTINUED | OUTPATIENT
Start: 2022-09-13 | End: 2022-09-13 | Stop reason: SURG

## 2022-09-13 RX ORDER — BUPIVACAINE HYDROCHLORIDE 2.5 MG/ML
INJECTION, SOLUTION EPIDURAL; INFILTRATION; INTRACAUDAL
Status: COMPLETED | OUTPATIENT
Start: 2022-09-13 | End: 2022-09-13

## 2022-09-13 RX ORDER — SODIUM CHLORIDE 0.9 % (FLUSH) 0.9 %
10 SYRINGE (ML) INJECTION EVERY 12 HOURS SCHEDULED
Status: DISCONTINUED | OUTPATIENT
Start: 2022-09-13 | End: 2022-09-13 | Stop reason: HOSPADM

## 2022-09-13 RX ORDER — CEFAZOLIN SODIUM 2 G/100ML
2 INJECTION, SOLUTION INTRAVENOUS EVERY 8 HOURS
Status: DISCONTINUED | OUTPATIENT
Start: 2022-09-13 | End: 2022-09-14 | Stop reason: HOSPADM

## 2022-09-13 RX ORDER — HYDROMORPHONE HCL 110MG/55ML
PATIENT CONTROLLED ANALGESIA SYRINGE INTRAVENOUS AS NEEDED
Status: DISCONTINUED | OUTPATIENT
Start: 2022-09-13 | End: 2022-09-13 | Stop reason: SURG

## 2022-09-13 RX ORDER — DIPHENOXYLATE HYDROCHLORIDE AND ATROPINE SULFATE 2.5; .025 MG/1; MG/1
1 TABLET ORAL DAILY
Status: DISCONTINUED | OUTPATIENT
Start: 2022-09-13 | End: 2022-09-14 | Stop reason: HOSPADM

## 2022-09-13 RX ORDER — MAGNESIUM HYDROXIDE 1200 MG/15ML
LIQUID ORAL AS NEEDED
Status: DISCONTINUED | OUTPATIENT
Start: 2022-09-13 | End: 2022-09-13 | Stop reason: HOSPADM

## 2022-09-13 RX ORDER — ALBUMIN, HUMAN INJ 5% 5 %
SOLUTION INTRAVENOUS CONTINUOUS PRN
Status: DISCONTINUED | OUTPATIENT
Start: 2022-09-13 | End: 2022-09-13 | Stop reason: SURG

## 2022-09-13 RX ORDER — DIPHENHYDRAMINE HYDROCHLORIDE 50 MG/ML
12.5 INJECTION INTRAMUSCULAR; INTRAVENOUS EVERY 6 HOURS PRN
Status: DISCONTINUED | OUTPATIENT
Start: 2022-09-13 | End: 2022-09-14 | Stop reason: HOSPADM

## 2022-09-13 RX ORDER — FENTANYL CITRATE 50 UG/ML
50 INJECTION, SOLUTION INTRAMUSCULAR; INTRAVENOUS
Status: DISCONTINUED | OUTPATIENT
Start: 2022-09-13 | End: 2022-09-13 | Stop reason: HOSPADM

## 2022-09-13 RX ORDER — LIDOCAINE HYDROCHLORIDE 10 MG/ML
0.5 INJECTION, SOLUTION EPIDURAL; INFILTRATION; INTRACAUDAL; PERINEURAL ONCE AS NEEDED
Status: COMPLETED | OUTPATIENT
Start: 2022-09-13 | End: 2022-09-13

## 2022-09-13 RX ORDER — SODIUM CHLORIDE 9 MG/ML
50 INJECTION, SOLUTION INTRAVENOUS CONTINUOUS
Status: DISCONTINUED | OUTPATIENT
Start: 2022-09-13 | End: 2022-09-14 | Stop reason: HOSPADM

## 2022-09-13 RX ORDER — LIDOCAINE HYDROCHLORIDE 10 MG/ML
INJECTION, SOLUTION EPIDURAL; INFILTRATION; INTRACAUDAL; PERINEURAL AS NEEDED
Status: DISCONTINUED | OUTPATIENT
Start: 2022-09-13 | End: 2022-09-13 | Stop reason: SURG

## 2022-09-13 RX ORDER — FAMOTIDINE 20 MG/1
20 TABLET, FILM COATED ORAL ONCE
Status: COMPLETED | OUTPATIENT
Start: 2022-09-13 | End: 2022-09-13

## 2022-09-13 RX ORDER — PANTOPRAZOLE SODIUM 40 MG/1
40 TABLET, DELAYED RELEASE ORAL EVERY MORNING
Refills: 5 | Status: DISCONTINUED | OUTPATIENT
Start: 2022-09-13 | End: 2022-09-14 | Stop reason: HOSPADM

## 2022-09-13 RX ORDER — ONDANSETRON 2 MG/ML
4 INJECTION INTRAMUSCULAR; INTRAVENOUS ONCE AS NEEDED
Status: DISCONTINUED | OUTPATIENT
Start: 2022-09-13 | End: 2022-09-13 | Stop reason: HOSPADM

## 2022-09-13 RX ORDER — MIDAZOLAM HYDROCHLORIDE 1 MG/ML
1 INJECTION INTRAMUSCULAR; INTRAVENOUS
Status: DISCONTINUED | OUTPATIENT
Start: 2022-09-13 | End: 2022-09-13 | Stop reason: HOSPADM

## 2022-09-13 RX ORDER — ONDANSETRON 2 MG/ML
4 INJECTION INTRAMUSCULAR; INTRAVENOUS EVERY 6 HOURS PRN
Status: DISCONTINUED | OUTPATIENT
Start: 2022-09-13 | End: 2022-09-14 | Stop reason: HOSPADM

## 2022-09-13 RX ORDER — FLUOXETINE HYDROCHLORIDE 20 MG/1
40 CAPSULE ORAL 2 TIMES DAILY
Status: DISCONTINUED | OUTPATIENT
Start: 2022-09-13 | End: 2022-09-14 | Stop reason: HOSPADM

## 2022-09-13 RX ORDER — MEPERIDINE HYDROCHLORIDE 25 MG/ML
12.5 INJECTION INTRAMUSCULAR; INTRAVENOUS; SUBCUTANEOUS
Status: DISCONTINUED | OUTPATIENT
Start: 2022-09-13 | End: 2022-09-13 | Stop reason: HOSPADM

## 2022-09-13 RX ORDER — PROMETHAZINE HYDROCHLORIDE 12.5 MG/1
6.25 SUPPOSITORY RECTAL EVERY 6 HOURS PRN
Status: DISCONTINUED | OUTPATIENT
Start: 2022-09-13 | End: 2022-09-14 | Stop reason: HOSPADM

## 2022-09-13 RX ORDER — ACETAMINOPHEN 500 MG
1000 TABLET ORAL EVERY 6 HOURS
Status: DISCONTINUED | OUTPATIENT
Start: 2022-09-13 | End: 2022-09-14 | Stop reason: HOSPADM

## 2022-09-13 RX ORDER — DIAZEPAM 5 MG/1
5 TABLET ORAL EVERY 8 HOURS PRN
Status: DISCONTINUED | OUTPATIENT
Start: 2022-09-13 | End: 2022-09-14 | Stop reason: HOSPADM

## 2022-09-13 RX ORDER — BUPIVACAINE HYDROCHLORIDE AND EPINEPHRINE 2.5; 5 MG/ML; UG/ML
INJECTION, SOLUTION EPIDURAL; INFILTRATION; INTRACAUDAL; PERINEURAL AS NEEDED
Status: DISCONTINUED | OUTPATIENT
Start: 2022-09-13 | End: 2022-09-13 | Stop reason: HOSPADM

## 2022-09-13 RX ORDER — ONDANSETRON 2 MG/ML
INJECTION INTRAMUSCULAR; INTRAVENOUS AS NEEDED
Status: DISCONTINUED | OUTPATIENT
Start: 2022-09-13 | End: 2022-09-13 | Stop reason: SURG

## 2022-09-13 RX ORDER — HYDROMORPHONE HYDROCHLORIDE 1 MG/ML
0.5 INJECTION, SOLUTION INTRAMUSCULAR; INTRAVENOUS; SUBCUTANEOUS
Status: DISCONTINUED | OUTPATIENT
Start: 2022-09-13 | End: 2022-09-13 | Stop reason: HOSPADM

## 2022-09-13 RX ORDER — PROPOFOL 10 MG/ML
VIAL (ML) INTRAVENOUS AS NEEDED
Status: DISCONTINUED | OUTPATIENT
Start: 2022-09-13 | End: 2022-09-13 | Stop reason: SURG

## 2022-09-13 RX ORDER — SODIUM CHLORIDE, SODIUM LACTATE, POTASSIUM CHLORIDE, CALCIUM CHLORIDE 600; 310; 30; 20 MG/100ML; MG/100ML; MG/100ML; MG/100ML
9 INJECTION, SOLUTION INTRAVENOUS CONTINUOUS
Status: DISCONTINUED | OUTPATIENT
Start: 2022-09-13 | End: 2022-09-14 | Stop reason: HOSPADM

## 2022-09-13 RX ORDER — CEFAZOLIN SODIUM 2 G/100ML
INJECTION, SOLUTION INTRAVENOUS AS NEEDED
Status: DISCONTINUED | OUTPATIENT
Start: 2022-09-13 | End: 2022-09-13 | Stop reason: SURG

## 2022-09-13 RX ORDER — DEXAMETHASONE SODIUM PHOSPHATE 10 MG/ML
INJECTION, SOLUTION INTRAMUSCULAR; INTRAVENOUS
Status: COMPLETED | OUTPATIENT
Start: 2022-09-13 | End: 2022-09-13

## 2022-09-13 RX ORDER — OXYCODONE HYDROCHLORIDE 5 MG/1
5 TABLET ORAL EVERY 4 HOURS PRN
Status: DISCONTINUED | OUTPATIENT
Start: 2022-09-13 | End: 2022-09-14 | Stop reason: HOSPADM

## 2022-09-13 RX ORDER — ESMOLOL HYDROCHLORIDE 10 MG/ML
INJECTION INTRAVENOUS AS NEEDED
Status: DISCONTINUED | OUTPATIENT
Start: 2022-09-13 | End: 2022-09-13 | Stop reason: SURG

## 2022-09-13 RX ORDER — ROCURONIUM BROMIDE 10 MG/ML
INJECTION, SOLUTION INTRAVENOUS AS NEEDED
Status: DISCONTINUED | OUTPATIENT
Start: 2022-09-13 | End: 2022-09-13 | Stop reason: SURG

## 2022-09-13 RX ADMIN — EPHEDRINE SULFATE 10 MG: 50 INJECTION INTRAVENOUS at 09:56

## 2022-09-13 RX ADMIN — CEFAZOLIN SODIUM 2 G: 2 INJECTION, SOLUTION INTRAVENOUS at 16:14

## 2022-09-13 RX ADMIN — DEXAMETHASONE SODIUM PHOSPHATE 8 MG: 4 INJECTION, SOLUTION INTRA-ARTICULAR; INTRALESIONAL; INTRAMUSCULAR; INTRAVENOUS; SOFT TISSUE at 07:52

## 2022-09-13 RX ADMIN — EPHEDRINE SULFATE 10 MG: 50 INJECTION INTRAVENOUS at 08:24

## 2022-09-13 RX ADMIN — FLUOXETINE HYDROCHLORIDE 40 MG: 20 CAPSULE ORAL at 20:10

## 2022-09-13 RX ADMIN — ROCURONIUM BROMIDE 10 MG: 50 INJECTION, SOLUTION INTRAVENOUS at 12:20

## 2022-09-13 RX ADMIN — ROCURONIUM BROMIDE 30 MG: 50 INJECTION, SOLUTION INTRAVENOUS at 10:31

## 2022-09-13 RX ADMIN — ROCURONIUM BROMIDE 10 MG: 50 INJECTION, SOLUTION INTRAVENOUS at 10:03

## 2022-09-13 RX ADMIN — EPHEDRINE SULFATE 10 MG: 50 INJECTION INTRAVENOUS at 08:29

## 2022-09-13 RX ADMIN — HYDROMORPHONE HYDROCHLORIDE 0.5 MG: 2 INJECTION, SOLUTION INTRAMUSCULAR; INTRAVENOUS; SUBCUTANEOUS at 12:44

## 2022-09-13 RX ADMIN — ONDANSETRON 4 MG: 2 INJECTION INTRAMUSCULAR; INTRAVENOUS at 12:37

## 2022-09-13 RX ADMIN — FAMOTIDINE 20 MG: 20 TABLET ORAL at 06:20

## 2022-09-13 RX ADMIN — ACETAMINOPHEN 1000 MG: 500 TABLET, FILM COATED ORAL at 21:01

## 2022-09-13 RX ADMIN — ALBUMIN HUMAN: 0.05 INJECTION, SOLUTION INTRAVENOUS at 09:40

## 2022-09-13 RX ADMIN — LIDOCAINE HYDROCHLORIDE 50 MG: 10 INJECTION, SOLUTION EPIDURAL; INFILTRATION; INTRACAUDAL; PERINEURAL at 07:46

## 2022-09-13 RX ADMIN — ONDANSETRON 4 MG: 2 INJECTION INTRAMUSCULAR; INTRAVENOUS at 15:55

## 2022-09-13 RX ADMIN — PROPOFOL 200 MG: 10 INJECTION, EMULSION INTRAVENOUS at 07:46

## 2022-09-13 RX ADMIN — HYDROMORPHONE HYDROCHLORIDE 0.5 MG: 2 INJECTION, SOLUTION INTRAMUSCULAR; INTRAVENOUS; SUBCUTANEOUS at 12:42

## 2022-09-13 RX ADMIN — ALBUMIN HUMAN: 0.05 INJECTION, SOLUTION INTRAVENOUS at 10:00

## 2022-09-13 RX ADMIN — LIDOCAINE HYDROCHLORIDE 0.5 ML: 10 INJECTION, SOLUTION EPIDURAL; INFILTRATION; INTRACAUDAL; PERINEURAL at 06:20

## 2022-09-13 RX ADMIN — PROMETHAZINE HYDROCHLORIDE 6.25 MG: 12.5 TABLET ORAL at 20:11

## 2022-09-13 RX ADMIN — ROCURONIUM BROMIDE 20 MG: 50 INJECTION, SOLUTION INTRAVENOUS at 11:29

## 2022-09-13 RX ADMIN — PROPOFOL 25 MCG/KG/MIN: 10 INJECTION, EMULSION INTRAVENOUS at 07:52

## 2022-09-13 RX ADMIN — SODIUM CHLORIDE, POTASSIUM CHLORIDE, SODIUM LACTATE AND CALCIUM CHLORIDE 9 ML/HR: 600; 310; 30; 20 INJECTION, SOLUTION INTRAVENOUS at 06:20

## 2022-09-13 RX ADMIN — SODIUM CHLORIDE, POTASSIUM CHLORIDE, SODIUM LACTATE AND CALCIUM CHLORIDE: 600; 310; 30; 20 INJECTION, SOLUTION INTRAVENOUS at 12:45

## 2022-09-13 RX ADMIN — MULTIVITAMIN TABLET 1 TABLET: TABLET at 16:15

## 2022-09-13 RX ADMIN — HYDROMORPHONE HYDROCHLORIDE 1 MG: 2 INJECTION, SOLUTION INTRAMUSCULAR; INTRAVENOUS; SUBCUTANEOUS at 12:46

## 2022-09-13 RX ADMIN — CEFAZOLIN SODIUM 2 G: 2 INJECTION, SOLUTION INTRAVENOUS at 23:50

## 2022-09-13 RX ADMIN — MORPHINE SULFATE 2 MG: 2 INJECTION, SOLUTION INTRAMUSCULAR; INTRAVENOUS at 15:55

## 2022-09-13 RX ADMIN — SUGAMMADEX 150 MG: 100 INJECTION, SOLUTION INTRAVENOUS at 12:39

## 2022-09-13 RX ADMIN — SODIUM CHLORIDE 50 ML/HR: 9 INJECTION, SOLUTION INTRAVENOUS at 16:15

## 2022-09-13 RX ADMIN — CEFAZOLIN SODIUM 2 G: 2 INJECTION, SOLUTION INTRAVENOUS at 07:52

## 2022-09-13 RX ADMIN — ROCURONIUM BROMIDE 20 MG: 50 INJECTION, SOLUTION INTRAVENOUS at 09:16

## 2022-09-13 RX ADMIN — CEFAZOLIN SODIUM 2 G: 2 INJECTION, SOLUTION INTRAVENOUS at 11:29

## 2022-09-13 RX ADMIN — ROCURONIUM BROMIDE 50 MG: 50 INJECTION, SOLUTION INTRAVENOUS at 07:46

## 2022-09-13 RX ADMIN — PANTOPRAZOLE SODIUM 40 MG: 40 TABLET, DELAYED RELEASE ORAL at 16:15

## 2022-09-13 RX ADMIN — BUPIVACAINE HYDROCHLORIDE 60 ML: 2.5 INJECTION, SOLUTION EPIDURAL; INFILTRATION; INTRACAUDAL; PERINEURAL at 07:50

## 2022-09-13 RX ADMIN — ACETAMINOPHEN 1000 MG: 500 TABLET, FILM COATED ORAL at 16:14

## 2022-09-13 RX ADMIN — OXYCODONE 5 MG: 5 TABLET ORAL at 22:02

## 2022-09-13 RX ADMIN — FENTANYL CITRATE 100 MCG: 50 INJECTION, SOLUTION INTRAMUSCULAR; INTRAVENOUS at 07:46

## 2022-09-13 RX ADMIN — DEXAMETHASONE SODIUM PHOSPHATE 2 MG: 10 INJECTION, SOLUTION INTRAMUSCULAR; INTRAVENOUS at 07:50

## 2022-09-13 RX ADMIN — ESMOLOL HYDROCHLORIDE 50 MG: 10 INJECTION, SOLUTION INTRAVENOUS at 13:14

## 2022-09-13 RX ADMIN — SODIUM CHLORIDE, POTASSIUM CHLORIDE, SODIUM LACTATE AND CALCIUM CHLORIDE: 600; 310; 30; 20 INJECTION, SOLUTION INTRAVENOUS at 09:40

## 2022-09-13 RX ADMIN — MORPHINE SULFATE 2 MG: 2 INJECTION, SOLUTION INTRAMUSCULAR; INTRAVENOUS at 18:08

## 2022-09-13 RX ADMIN — ROCURONIUM BROMIDE 20 MG: 50 INJECTION, SOLUTION INTRAVENOUS at 08:21

## 2022-09-13 RX ADMIN — FLUOXETINE HYDROCHLORIDE 40 MG: 20 CAPSULE ORAL at 16:15

## 2022-09-13 RX ADMIN — MORPHINE SULFATE 2 MG: 2 INJECTION, SOLUTION INTRAMUSCULAR; INTRAVENOUS at 20:11

## 2022-09-13 NOTE — OP NOTE
Operative Report    Patient Name:  Riana Anthony  YOB: 1990  [id number]    9/13/2022      PREOPERATIVE DIAGNOSIS:  Ventral Incisional Hernia       POSTOPERATIVE DIAGNOSIS: Same        PROCEDURE PERFORMED:     1.  Open Primary Ventral Incisional Hernia Repair        SURGEON: Segundo Morales MD      ASSISTANT:    Terrell Bob MD PGY-6 Resident        SPECIMENS: incarcerated hernia sac and contents       ESTIMATED BLOOD LOSS: 10 mL for my portion of the case       ANESTHESIA: General        FINDINGS:  1. There was a fair amount of rectus diastasis with the rectus muscles at least 3 cm    2. There were 3 separate small hernia defects along her prior midline incision, 1 below the umbilicus and two above the umbilicus. All of these were less than 2 cm and within the linea alba. Given that the patient was planned to undergo concomitant rectus plication with the plastic surgery service, these were closed primarily        INDICATIONS:      This patient is a 32 y.o. female with a history of ventral incisional hernia who was planned to undergo abdominoplasty today with the plastic surgery service.  There was concern for a small supraumbilical hernia. Pre-operative imaging including CT scan confirmed the diagnosis of what appeared to be only a small supraumbilical hernia.  I was asked to assist with concomitant repair.  The risks, benefits, and alternatives of the procedure were discussed with the patient and their family preoperatively and they agreed to proceed.          DESCRIPTION OF PROCEDURE:      The patient was encountered in the operating room already under general anesthesia with the abdomen prepped and draped.  Exposure by the plastic surgery service had already been performed, with abdominal skin flaps elevated off the anterior abdominal wall fascia from the level of the pubic symphysis superiorly to above the umbilicus.     Careful examination of the anterior abdominal wall  which was already exposed, demonstrated a fair bit of rectus diastases with at least 3 cm of separation of the rectus muscles.  In the infraumbilical position, there was evidence of a just under 2 cm hernia.  The hernia sac was completely excised from this area and there was no significant omental or bowel adhesions along the site.  We then turned our attention to the supraumbilical site, we identified the dominant hernia defect approximately 5 cm above the umbilicus.  We extended our dissection by clearing the anterior abdominal wall fascia for several centimeters superior to this site.  It appeared that this hernia was located on the anterior abdominal wall where the falciform ligament inserted into the abdominal wall.  The hernia sac and a small portion of incarcerated fat was completely excised.  This was tied off with a 2-0 silk suture.  The specimen was passed off the field as hernia sac and contents. The anterior abdominal wall fascia was circumferentially cleared along this site/ The total hernia defect at this site was just under 2 cm.  We then examined inferior to this where there appeared to be a portion of intact fascia but then 1 cm below this another very small hernia defect at just under 1 cm.  Similarly this site was cleared and the hernia sac excised.     We then carefully examined these hernia defects.  Using a ruler, I measured and found that if we connected all 3 of these hernia defects, this would extend for 15 cm in the craniocaudad direction.  All of these defects were located in the midline along the linea alba and extended less than 1 cm laterally. I considered placement of a synthetic or absorbable mesh in both an intraperitoneal location or in the retrorectus space. However in discussion with the plastic surgery service, they planned a rectus plication to address the patient's diastasis and I was concerned that this would lead to folding of the mesh and did feel that the plication in  itself would provide some reinforcement to a primary repair. Because of this, I elected to proceed with primary repair only. Each of the hernia defects was then closed vertically using simple interrupted figure of eight #1 PDS suture. Hemostasis of all wound sites was confirmed.      The case was then turned back over to the plastic surgery service. I was present for all of my portions of this procedure.             Segundo Morales MD  9/13/2022  11:02 EDT

## 2022-09-13 NOTE — PROGRESS NOTES
"Patient Name:  Riana Anthony  YOB: 1990  [id number]    Surgery Post - Operative Note    Date of visit: 9/13/2022      Subjective: Resting in bed.  Pain is controlled.  Denies nausea or vomiting.        Objective:    /66 (BP Location: Right arm, Patient Position: Lying)   Pulse 86   Temp 97.9 °F (36.6 °C) (Oral)   Resp 18   Ht 167.6 cm (66\")   Wt 77.1 kg (170 lb)   LMP 08/29/2022 (Approximate)   SpO2 98%   BMI 27.44 kg/m²     CV:  Regular rate and rhythm   L:  Not labored on O2 by NC   ABD:  Soft, appropriately tender. Dressings clean, dry and intact   EXT:  No cyanosis, clubbing or edema        Assessment/ Plan:     Recovering well after open primary ventral hernia repair and abdominoplasty. Continue Pulmonary toilet        Segundo Morales MD  9/13/2022  17:50 EDT      "

## 2022-09-13 NOTE — ANESTHESIA PREPROCEDURE EVALUATION
Anesthesia Evaluation     Patient summary reviewed and Nursing notes reviewed   no history of anesthetic complications:  NPO Solid Status: > 8 hours  NPO Liquid Status: > 2 hours           Airway   Mallampati: II  TM distance: >3 FB  Neck ROM: full  No difficulty expected  Comment: Previous grade 1 view with MAC 3 blade  Dental - normal exam     Pulmonary - normal exam    breath sounds clear to auscultation  (+) a smoker (Vapes),   Cardiovascular - negative cardio ROS and normal exam    Rhythm: regular  Rate: normal        Neuro/Psych  (+) psychiatric history Anxiety,    (-) seizures, TIA, CVA  GI/Hepatic/Renal/Endo    (+) obesity,  GERD well controlled,      Musculoskeletal     (+) back pain,   Abdominal    Substance History      OB/GYN          Other                          Anesthesia Plan    ASA 2     general with block     intravenous induction     Anesthetic plan, risks, benefits, and alternatives have been provided, discussed and informed consent has been obtained with: patient.    Plan discussed with CRNA.

## 2022-09-13 NOTE — OP NOTE
DATE OF PROCEDURE: 09/13/22    PREOPERATIVE DIAGNOSIS:   1. Encounter for cosmetic surgery  2. Bilateral breast hypoplasia  3. Excess abdominal skin after weight loss  4. Ventral hernia     POSTOPERATIVE DIAGNOSIS:   1. Encounter for cosmetic surgery  2. Bilateral breast hypoplasia  3. Excess abdominal skin after weight loss  4. Ventral hernia     PROCEDURES PERFORMED:  1. Bilateral breast augmentation with silicone implants  2. Abdominoplasty     SURGEON: Frederick Betts MD     ASSISTANT: Giovanny Bob MD; Millicent Dodson PA-C     Assistant: West Ricks PA-C  was responsible for performing the following activities: Suturing and their skilled assistance was necessary for the success of this case.  Circulator: Ollie Leonard RN; Marian Florez RN  Physician Assistant: Millicent Dodson PA  Scrub Person: Batsheva Quinteros; Nati Lobo     ANESTHESIA: General.    INDICATIONS: The patient is a 32-year-old female who presented to my office desiring larger breasts with implants as well as removal of excess abdominal skin after massive weight loss. She had a gastric sleeve in [place] and later an exploratory laparotomy. She developed a ventral hernia for which she saw Dr. Morales. The patient understood all of the risks and benefits of the procedure, including infection, need for future surgeries, capuslar contracture, hematoma, need for drains, DVT, PE and elected to proceed. She sized for implants in my office to 350cc.     FINDINGS:  1. Placement of an Sientra-style 106  cc silicone implant on the right with a serial number of [id number].  2. Placement of an Sientra-style 106  cc silicone implant on the left with a serial number of [id number].   3. Removal of 300 mL of lipoaspirate from each flank.  4. Removal of 2.9 pounds of abdominal skin.     DESCRIPTION OF PROCEDURE: The patient was seen in preoperative holding and then marked in a standing position and taken to the operating room by anesthesia after  informed consent was signed and on the chart. The patient was placed supine on the operating room table and general anesthesia was induced. Once verified, the case was then turned over to the surgical service. The patient had her chest and abdomen prepped and draped in a normal sterile fashion. A timeout was called and all parties were in agreement including nursing and anesthesia. Preoperative antibiotics were given. TAP blocks were performed by the anesthesia service. We began on the right side to perform the breast augmentation part of the case. A 4cm IM fold incision was made with a 15 blade scalpel on the left. We dissected down to pectoralis muscle. It was released in a Dual plane 1 technique. We then made a subpectoral pocket. Sizers were used and the Sientra  cc implant was selected. It was opened and soaked in antibiotic irrigation. I changed my gloves and a Handy funnel was opened. The implant was placed in the pocket with the Handy funnel in a no touch technique. We locked the fold down to the chest wall with a 2-0 Vicryl suture and then closed the incision in layers with monocryl.  I then moved to the left side and performed an identical procedure. A Dual plane 1 breast augmentation with a Sientra  cc implant. At this point, the incisions were then closed with 2-0 Vicryl in an interrupted fashion and then a 3-0 Monocryl and a 4-0 Monocryl to close the skin. Skin Affix glue was placed on the incisions.      I then moved to the abdominoplasty portion of the case. I first performed flank liposuction bilaterally. Then after making the lower abdominal incision with a 10 blade scalpel we disected up to the xiphoid process after freeing up her umbilicus. We then encountered her ventral hernia. At this time Dr. Morales scrubbed in and performed the hernia repair. Please see his dictation for his portion of the case.     I then scrubbed back in. I then performed the plication portion of the case with  tightening of her rectus diastasis. I used a large looped 0 nylon in a running fashion both above and below the umbilicus as well as 0 Ethibond sutures in multiple areas.  I then flexed the table at her waist and performed progressive tension sutures with a 2-0 vicryl in multiple different rows. We then excised a large section of her abdominal skin. Bovie cautery was used for bleeding. We closed the incision after placing 1 15 [place] drain. The incision was closed first with 2-0 vicryl in Mitul's fascia. Then 3-0 monocryl and  A 3-0 Stratafix suture in a running fashion. The umbilicus was brought out through a new position on her abdominal skin and sutured in to place with 4-0 Monocryl and a 5-0 Monocyrl. Skin afix skin glue was placed on all incisions.  She then had Kerlix fluffs and was wrapped in an ACE wrap for her breasts. She also had an abdominal binder placed as well.  She was awakened, extubated and taken to PACU in stable condition. All sponge and instrument counts were correct. I, Dr. Frederick Betts, was present and scrubbed for the entire procedure.      SPECIMENS: None        ESTIMATED BLOOD LOSS: 50 cc.     DRAINS: BOBBI x 1     COMPLICATIONS: None immediate.     Frederick Betts MD  09/13/22  12:41 EDT

## 2022-09-13 NOTE — ANESTHESIA POSTPROCEDURE EVALUATION
Patient: Riana Anthony    Procedure Summary     Date: 09/13/22 Room / Location:  [facility]    Anesthesia Start: 0742 Anesthesia Stop: 1322    Procedures:       ABDOMINOPLASTY WITH LIPOSUCTION (N/A Abdomen)      BREAST AUGMENTATION BILATERAL WITH SILICONE IMPLANTS (Bilateral Breast)      VENTRAL HERNIA REPAIR OPEN WITH MESH (N/A Abdomen) Diagnosis:     Surgeons: Frederick Betts MD; Segundo Morales MD Provider: Keila Angulo MD    Anesthesia Type: general with block ASA Status: 2          Anesthesia Type: general with block    Vitals  Vitals Value Taken Time   BP 96/60 09/13/22 1319   Temp     Pulse 119 09/13/22 1321   Resp     SpO2 94 % 09/13/22 1321   Vitals shown include unvalidated device data.        Post Anesthesia Care and Evaluation    Patient location during evaluation: PACU  Patient participation: complete - patient participated  Level of consciousness: awake and alert  Pain management: adequate    Airway patency: patent  Anesthetic complications: No anesthetic complications  PONV Status: none  Cardiovascular status: hemodynamically stable and acceptable  Respiratory status: nonlabored ventilation, acceptable and nasal cannula  Hydration status: acceptable

## 2022-09-13 NOTE — ANESTHESIA PROCEDURE NOTES
Airway  Urgency: elective    Date/Time: 9/13/2022 7:48 AM  Airway not difficult    General Information and Staff    Patient location during procedure: OR  CRNA/CAA: Isrrael Cardoza CRNA    Indications and Patient Condition  Indications for airway management: airway protection    Preoxygenated: yes  MILS not maintained throughout  Mask difficulty assessment: 1 - vent by mask    Final Airway Details  Final airway type: endotracheal airway      Successful airway: ETT  Cuffed: yes   Successful intubation technique: direct laryngoscopy  Facilitating devices/methods: intubating stylet  Endotracheal tube insertion site: oral  Blade: Cristina  Blade size: 3  ETT size (mm): 7.0  Cormack-Lehane Classification: grade I - full view of glottis  Placement verified by: chest auscultation and capnometry   Cuff volume (mL): 6  Measured from: lips  ETT/EBT  to lips (cm): 20  Number of attempts at approach: 1  Assessment: lips, teeth, and gum same as pre-op and atraumatic intubation    Additional Comments  Negative epigastric sounds, Breath sound equal bilaterally with symmetric chest rise and fall

## 2022-09-13 NOTE — ANESTHESIA PROCEDURE NOTES
Peripheral Block      Patient reassessed immediately prior to procedure    Patient location during procedure: OR  Reason for block: at surgeon's request and post-op pain management  Performed by  CRNA/CAA: Zackery Crawley CRNA  Preanesthetic Checklist  Completed: patient identified, IV checked, site marked, risks and benefits discussed, surgical consent, monitors and equipment checked, pre-op evaluation and timeout performed  Prep:  Pt Position: supine  Sterile barriers:cap, gloves, sterile barriers and mask  Prep: ChloraPrep  Patient monitoring: blood pressure monitoring, continuous pulse oximetry and EKG  Procedure    Sedation: yes  Performed under: general  Guidance:ultrasound guided  Images:still images obtained, printed/placed on chart    Laterality:Bilateral  Block Type:TAP  Injection Technique:single-shot  Needle Type:short-bevel and echogenic  Needle Gauge:20 G  Resistance on Injection: none    Medications Used: dexamethasone sodium phosphate injection, 2 mg  bupivacaine PF (MARCAINE) 0.25 % injection, 60 mL      Medications  Comment:Block Injection:  LA dose divided between Right and Left block        Post Assessment  Injection Assessment: negative aspiration for heme, incremental injection and no paresthesia on injection  Patient Tolerance:comfortable throughout block  Complications:no  Additional Notes      Under Ultrasound guidance, a BBraun 4inch 360 degree needle was advanced with Normal Saline hydro dissection of tissue.  The Internal Oblique and Transversus Abdominus muscles where visualized.  At or before the aponeurosis of Internal Oblique, local anesthetic spread was visualized in the Transversus Abdominus Plane. Injection was made incrementally with aspiration every 5 mls.  There was no  intravascular injection,  injection pressure was normal, there was no neural injection, and the procedure was completed without difficulty.  Thank You.

## 2022-09-13 NOTE — H&P
"  [employer]   Physician      H&P      Signed   Date of Service:  08/16/22 0000   Creation Time:  09/12/22 0719              Associated Documents  Scan on 8/16/2022 by [employer]: CHANELL/[facility] SWEET, 08/16/2022         Last signed by: [employer] at 09/12/22 0719         [facility] Pre-op    Full history and physical note from office is up to date.     /73 (BP Location: Right arm, Patient Position: Lying)   Pulse 70   Temp 97.9 °F (36.6 °C) (Temporal)   Resp 18   Ht 167.6 cm (66\")   Wt 77.1 kg (170 lb)   LMP 08/29/2022 (Approximate)   SpO2 99%   BMI 27.44 kg/m²   Patient denies allergy to contrast dye or latex  IMM:  Influenza: Yes  Pneumococcal: No  Tetanus: Up-to-date  Lungs: Clear to auscultation bilateral bases  Cardiovascular: S1-S2 without rubs murmurs or gallops  Abdomen: Soft, nontender, bowel sounds present throughout.    LAB Results:  Lab Results   Component Value Date    WBC 8.56 09/01/2022    HGB 12.3 09/01/2022    HCT 36.7 09/01/2022    MCV 88.6 09/01/2022     09/01/2022    NEUTROABS 3.43 07/30/2022    GLUCOSE 78 09/01/2022    BUN 9 09/01/2022    CREATININE 0.79 09/01/2022    EGFRIFNONA >60 05/04/2022    EGFRIFAFRI >60 05/04/2022     09/01/2022    K 4.6 09/01/2022     09/01/2022    CO2 28.0 09/01/2022    CALCIUM 10.5 09/01/2022    ALBUMIN 4.10 07/30/2022     (H) 07/30/2022     (H) 07/30/2022    BILITOT 0.4 07/30/2022       Cancer Staging (if applicable)  Cancer Patient: __ yes __no __unknown__N/A; If yes, clinical stage T:__ N:__M:__, stage group or __N/A  Impression: Incisional hernia repair  Esophageal reflux disease  Status post bariatric surgery  Want of tummy tuck and breast augmentation  Plan: Abdominoplasty with liposuction, breast augmentation bilateral silicone implants.  #2 ventral hernia repair open with mesh  TANJA Beatty   09/13/22   6:50 AM EDT   "

## 2022-09-13 NOTE — BRIEF OP NOTE
ABDOMINOPLASTY, BREAST AUGMENTATION  Progress Note    Riana Dwyer Ahmad  9/13/2022    Pre-op Diagnosis:   1. Encounter for cosmetic surgery  2. Bilateral breast hypoplasia  3. Excess abdominal skin after weight loss  4. Ventral hernia       Post-Op Diagnosis Codes:   same     Procedure/CPT® Codes:        Procedure(s):  ABDOMINOPLASTY WITH LIPOSUCTION  BREAST AUGMENTATION BILATERAL WITH SILICONE IMPLANTS  VENTRAL HERNIA REPAIR OPEN WITH MESH        Surgeon(s):  Terrell Bob MD Huerta, Gustavo V, MD Lynch, Michael Paul, MD    Anesthesia: General    Staff:   Circulator: Ollie Leonard RN; Marian Florez RN  Physician Assistant: Millicent Dodson PA  Scrub Person: Batsheva Quinteros; Nati Lobo         Estimated Blood Loss: 100ml    Urine Voided: 300 mL    Specimens:                Specimens     ID Source Type Tests Collected By Collected At Frozen?    A Hernia, Sac Tissue · TISSUE PATHOLOGY EXAM   Frederick Betts MD 9/13/22 1012     Description: Incarcerarted Hernia Contents                Drains:   Closed/Suction Drain 1 Proximal;Right;Anterior Thigh Bulb 15 Fr. (Active)       Urethral Catheter Latex 16 Fr. (Active)       Findings: see op note        Complications: none immediate           Frederick Betts MD     Date: 9/13/2022  Time: 12:39 EDT

## 2022-09-14 VITALS
DIASTOLIC BLOOD PRESSURE: 73 MMHG | RESPIRATION RATE: 17 BRPM | HEIGHT: 66 IN | SYSTOLIC BLOOD PRESSURE: 110 MMHG | HEART RATE: 64 BPM | OXYGEN SATURATION: 92 % | TEMPERATURE: 97 F | WEIGHT: 170 LBS | BODY MASS INDEX: 27.32 KG/M2

## 2022-09-14 PROCEDURE — 25010000002 MORPHINE PER 10 MG: Performed by: PLASTIC SURGERY

## 2022-09-14 PROCEDURE — 25010000002 CEFAZOLIN IN DEXTROSE 2-4 GM/100ML-% SOLUTION: Performed by: PLASTIC SURGERY

## 2022-09-14 RX ADMIN — MORPHINE SULFATE 2 MG: 2 INJECTION, SOLUTION INTRAMUSCULAR; INTRAVENOUS at 06:01

## 2022-09-14 RX ADMIN — MORPHINE SULFATE 2 MG: 2 INJECTION, SOLUTION INTRAMUSCULAR; INTRAVENOUS at 01:10

## 2022-09-14 RX ADMIN — PANTOPRAZOLE SODIUM 40 MG: 40 TABLET, DELAYED RELEASE ORAL at 06:01

## 2022-09-14 RX ADMIN — OXYCODONE 5 MG: 5 TABLET ORAL at 03:14

## 2022-09-14 RX ADMIN — MULTIVITAMIN TABLET 1 TABLET: TABLET at 08:06

## 2022-09-14 RX ADMIN — FLUOXETINE HYDROCHLORIDE 40 MG: 20 CAPSULE ORAL at 08:06

## 2022-09-14 RX ADMIN — ACETAMINOPHEN 1000 MG: 500 TABLET, FILM COATED ORAL at 10:25

## 2022-09-14 RX ADMIN — CEFAZOLIN SODIUM 2 G: 2 INJECTION, SOLUTION INTRAVENOUS at 08:07

## 2022-09-14 RX ADMIN — OXYCODONE 5 MG: 5 TABLET ORAL at 08:16

## 2022-09-14 RX ADMIN — ACETAMINOPHEN 1000 MG: 500 TABLET, FILM COATED ORAL at 05:17

## 2022-09-14 NOTE — NURSING NOTE
Pt AOx4, independent OOB. VSS this morning. meds given as ordered. PRN pain meds given to control pain level. Discharge instructions reviewed with pt and questions answered appropriately.

## 2022-09-14 NOTE — PROGRESS NOTES
"Patient Name:  Riana Anthony  YOB: 1990  [id number]    Surgery Progress Note    Date of visit: 9/14/2022      Subjective: No acute events overnight.  Had some mild nausea but this is improved.  Has tolerated diet.  Pain is controlled.  Eager to go home          Objective:     /73 (BP Location: Left arm, Patient Position: Lying)   Pulse 64   Temp 97 °F (36.1 °C) (Temporal)   Resp 17   Ht 167.6 cm (66\")   Wt 77.1 kg (170 lb)   LMP 08/29/2022 (Approximate)   SpO2 92%   BMI 27.44 kg/m²     Intake/Output Summary (Last 24 hours) at 9/14/2022 0748  Last data filed at 9/14/2022 0700  Gross per 24 hour   Intake 2300 ml   Output 1530 ml   Net 770 ml       GEN:   Awake, alert, in no acute distress, resting comfortably in bed   CV:   Regular rate and rhythm  L:  Symmetric expansion, not labored on room air  Abd:  Soft, not obviously distended, appropriately tender palpation along incisions, drain in place with serosanguineous output  Ext:  No cyanosis, clubbing, or edema    Recent labs that are back at this time have been reviewed.           Assessment/ Plan:    Mrs. Haddad is a 32-year-old lady who presented on September 13 for planned abdominoplasty who underwent concomitant primary ventral incisional hernia repair    -Doing well.  Pain is controlled.  Tolerating a diet  -Okay for discharge from my standpoint  -Wound care per plastic surgery  -Should follow-up with me in 2 weeks.  No lifting more than 10 pounds for the next 6 weeks      Segundo Morales MD  9/14/2022  07:48 EDT      "

## 2022-09-14 NOTE — PROGRESS NOTES
LOS: 0 days   Patient Care Team:  Alie Messina APRN as PCP - General (Family Medicine)  Segundo Morales MD as Surgeon (General Surgery)  Rena Pinzon MD as Consulting Physician (Obstetrics and Gynecology)    Chief Complaint:  Doing well. Pain controlled.     Subjective     Interval History:     Patient Complaints: none  Patient Denies:  Significant pain.   History taken from: patient        Objective     Vital Signs  Temp:  [97 °F (36.1 °C)-97.9 °F (36.6 °C)] 97.8 °F (36.6 °C)  Heart Rate:  [] 73  Resp:  [16-18] 16  BP: ()/(56-91) 108/67    Physical Exam:  GEN: NAD, AAOx3   Breast: implants in good position. No hematomas. Riding high. Incisions c/d/i  ABD: soft. skin flaps pink, viable. No hematomas. Incisions c/d/i. BOBBI drain in place with serosanginous drainage.        Results Review:     I reviewed the patient's new clinical results.  Lab Results   Component Value Date    WBC 8.56 09/01/2022    HGB 12.3 09/01/2022    HCT 36.7 09/01/2022    MCV 88.6 09/01/2022     09/01/2022     Lab Results   Component Value Date    GLUCOSE 78 09/01/2022    CALCIUM 10.5 09/01/2022     09/01/2022    K 4.6 09/01/2022    CO2 28.0 09/01/2022     09/01/2022    BUN 9 09/01/2022    CREATININE 0.79 09/01/2022    EGFRIFAFRI >60 05/04/2022    EGFRIFNONA >60 05/04/2022    BCR 11.4 09/01/2022    ANIONGAP 10.0 09/01/2022   No results found for: PTTNo results found for: INR, PROTIME      Assessment & Plan       Ventral hernia      1. D/c home  2. F/u on Monday in office  3. Leave dressing on until tomorrow. Can then take off and shower and then back into bra and binder AAT  4. Drain care  5. Scripts already filled  6. No lifting  7. Arixtra started today for 5 days  8. Must walk and sleep bent at waist  9. Incentive spirometer  10. Pad incisions with ABD pads    Plan for disposition: home    Frederick Betts MD  09/14/22  07:10 EDT

## 2022-09-14 NOTE — PLAN OF CARE
Goal Outcome Evaluation:  Plan of Care Reviewed With: patient           Outcome Evaluation: VSS. Pt complained of pain and nausea, meds given per orders. Pt ambulated to bathroom with standby assistance. Pt tolerating diet. Will continue with current plan of care.

## 2022-09-22 PROBLEM — F29 PSYCHOSIS (HCC): Status: ACTIVE | Noted: 2022-09-22

## 2022-09-22 PROBLEM — F32.A DEPRESSION: Status: ACTIVE | Noted: 2022-09-22

## 2023-05-30 ENCOUNTER — TELEPHONE (OUTPATIENT)
Dept: FAMILY MEDICINE CLINIC | Facility: CLINIC | Age: 33
End: 2023-05-30

## 2023-05-30 DIAGNOSIS — R21 RASH: Primary | ICD-10-CM

## 2023-05-30 NOTE — TELEPHONE ENCOUNTER
Caller: Riana Anthony    Relationship: Self    Best call back number: [phone]    What is the medical concern/diagnosis: DARK SPOTS ARE APPEARING     What specialty or service is being requested: DERMATOLOGY    What is the provider, practice or medical service name: N/A    What is the office location: N/A    What is the office phone number: N/A    Any additional details: SHE DOES NOT CARE WHO SHE IS SENT TO JUST WANTS THEM TO ACCEPT MEDICAID PATIENTS.

## 2023-06-03 PROCEDURE — 87077 CULTURE AEROBIC IDENTIFY: CPT

## 2023-06-03 PROCEDURE — 87186 SC STD MICRODIL/AGAR DIL: CPT

## 2023-06-03 PROCEDURE — 87086 URINE CULTURE/COLONY COUNT: CPT

## 2024-02-27 ENCOUNTER — HOSPITAL ENCOUNTER (EMERGENCY)
Facility: HOSPITAL | Age: 34
Discharge: HOME | End: 2024-02-27
Attending: EMERGENCY MEDICINE | Admitting: EMERGENCY MEDICINE
Payer: MEDICAID

## 2024-02-27 ENCOUNTER — APPOINTMENT (OUTPATIENT)
Dept: CT IMAGING | Facility: HOSPITAL | Age: 34
End: 2024-02-27
Payer: MEDICAID

## 2024-02-27 VITALS
HEART RATE: 79 BPM | SYSTOLIC BLOOD PRESSURE: 97 MMHG | WEIGHT: 220 LBS | BODY MASS INDEX: 35.36 KG/M2 | DIASTOLIC BLOOD PRESSURE: 55 MMHG | OXYGEN SATURATION: 96 % | HEIGHT: 66 IN | TEMPERATURE: 97.6 F | RESPIRATION RATE: 16 BRPM

## 2024-02-27 DIAGNOSIS — K21.9 GASTROESOPHAGEAL REFLUX DISEASE, UNSPECIFIED WHETHER ESOPHAGITIS PRESENT: ICD-10-CM

## 2024-02-27 DIAGNOSIS — R14.0 GENERALIZED BLOATING: ICD-10-CM

## 2024-02-27 DIAGNOSIS — K29.00 ACUTE GASTRITIS WITHOUT HEMORRHAGE, UNSPECIFIED GASTRITIS TYPE: ICD-10-CM

## 2024-02-27 DIAGNOSIS — R12 HEARTBURN: ICD-10-CM

## 2024-02-27 DIAGNOSIS — R10.9 NONSPECIFIC ABDOMINAL PAIN: Primary | ICD-10-CM

## 2024-02-27 LAB
ALBUMIN SERPL-MCNC: 4 G/DL (ref 3.5–5.2)
ALBUMIN/GLOB SERPL: 1.1 G/DL
ALP SERPL-CCNC: 168 U/L (ref 39–117)
ALT SERPL W P-5'-P-CCNC: 68 U/L (ref 1–33)
ANION GAP SERPL CALCULATED.3IONS-SCNC: 9 MMOL/L (ref 5–15)
AST SERPL-CCNC: 67 U/L (ref 1–32)
B-HCG UR QL: NEGATIVE
BACTERIA UR QL AUTO: ABNORMAL /HPF
BASOPHILS # BLD AUTO: 0.05 10*3/MM3 (ref 0–0.2)
BASOPHILS NFR BLD AUTO: 0.5 % (ref 0–1.5)
BILIRUB SERPL-MCNC: 0.2 MG/DL (ref 0–1.2)
BILIRUB UR QL STRIP: NEGATIVE
BUN SERPL-MCNC: 9 MG/DL (ref 6–20)
BUN/CREAT SERPL: 10.2 (ref 7–25)
CALCIUM SPEC-SCNC: 8.9 MG/DL (ref 8.6–10.5)
CHLORIDE SERPL-SCNC: 107 MMOL/L (ref 98–107)
CLARITY UR: CLEAR
CO2 SERPL-SCNC: 22 MMOL/L (ref 22–29)
COLOR UR: YELLOW
CREAT SERPL-MCNC: 0.88 MG/DL (ref 0.57–1)
DEPRECATED RDW RBC AUTO: 40.1 FL (ref 37–54)
EGFRCR SERPLBLD CKD-EPI 2021: 89.7 ML/MIN/1.73
EOSINOPHIL # BLD AUTO: 0.17 10*3/MM3 (ref 0–0.4)
EOSINOPHIL NFR BLD AUTO: 1.7 % (ref 0.3–6.2)
ERYTHROCYTE [DISTWIDTH] IN BLOOD BY AUTOMATED COUNT: 12.7 % (ref 12.3–15.4)
EXPIRATION DATE: NORMAL
GLOBULIN UR ELPH-MCNC: 3.6 GM/DL
GLUCOSE SERPL-MCNC: 103 MG/DL (ref 65–99)
GLUCOSE UR STRIP-MCNC: NEGATIVE MG/DL
HCG INTACT+B SERPL-ACNC: <0.1 MIU/ML
HCT VFR BLD AUTO: 41.3 % (ref 34–46.6)
HGB BLD-MCNC: 13.8 G/DL (ref 12–15.9)
HGB UR QL STRIP.AUTO: ABNORMAL
HOLD SPECIMEN: NORMAL
HYALINE CASTS UR QL AUTO: ABNORMAL /LPF
IMM GRANULOCYTES # BLD AUTO: 0.03 10*3/MM3 (ref 0–0.05)
IMM GRANULOCYTES NFR BLD AUTO: 0.3 % (ref 0–0.5)
INTERNAL NEGATIVE CONTROL: NEGATIVE
INTERNAL POSITIVE CONTROL: POSITIVE
KETONES UR QL STRIP: NEGATIVE
LEUKOCYTE ESTERASE UR QL STRIP.AUTO: NEGATIVE
LIPASE SERPL-CCNC: 28 U/L (ref 13–60)
LYMPHOCYTES # BLD AUTO: 3.52 10*3/MM3 (ref 0.7–3.1)
LYMPHOCYTES NFR BLD AUTO: 34.5 % (ref 19.6–45.3)
Lab: NORMAL
MCH RBC QN AUTO: 29.1 PG (ref 26.6–33)
MCHC RBC AUTO-ENTMCNC: 33.4 G/DL (ref 31.5–35.7)
MCV RBC AUTO: 86.9 FL (ref 79–97)
MONOCYTES # BLD AUTO: 0.51 10*3/MM3 (ref 0.1–0.9)
MONOCYTES NFR BLD AUTO: 5 % (ref 5–12)
NEUTROPHILS NFR BLD AUTO: 5.93 10*3/MM3 (ref 1.7–7)
NEUTROPHILS NFR BLD AUTO: 58 % (ref 42.7–76)
NITRITE UR QL STRIP: NEGATIVE
NRBC BLD AUTO-RTO: 0 /100 WBC (ref 0–0.2)
PH UR STRIP.AUTO: 6 [PH] (ref 5–8)
PLATELET # BLD AUTO: 404 10*3/MM3 (ref 140–450)
PMV BLD AUTO: 10.1 FL (ref 6–12)
POTASSIUM SERPL-SCNC: 4.3 MMOL/L (ref 3.5–5.2)
PROT SERPL-MCNC: 7.6 G/DL (ref 6–8.5)
PROT UR QL STRIP: NEGATIVE
RBC # BLD AUTO: 4.75 10*6/MM3 (ref 3.77–5.28)
RBC # UR STRIP: ABNORMAL /HPF
REF LAB TEST METHOD: ABNORMAL
SODIUM SERPL-SCNC: 138 MMOL/L (ref 136–145)
SP GR UR STRIP: 1.01 (ref 1–1.03)
SQUAMOUS #/AREA URNS HPF: ABNORMAL /HPF
UROBILINOGEN UR QL STRIP: ABNORMAL
WBC # UR STRIP: ABNORMAL /HPF
WBC NRBC COR # BLD AUTO: 10.21 10*3/MM3 (ref 3.4–10.8)
WHOLE BLOOD HOLD COAG: NORMAL
WHOLE BLOOD HOLD SPECIMEN: NORMAL

## 2024-02-27 PROCEDURE — 74176 CT ABD & PELVIS W/O CONTRAST: CPT

## 2024-02-27 PROCEDURE — 81001 URINALYSIS AUTO W/SCOPE: CPT | Performed by: EMERGENCY MEDICINE

## 2024-02-27 PROCEDURE — 80053 COMPREHEN METABOLIC PANEL: CPT | Performed by: EMERGENCY MEDICINE

## 2024-02-27 PROCEDURE — 84702 CHORIONIC GONADOTROPIN TEST: CPT | Performed by: EMERGENCY MEDICINE

## 2024-02-27 PROCEDURE — 81025 URINE PREGNANCY TEST: CPT | Performed by: EMERGENCY MEDICINE

## 2024-02-27 PROCEDURE — 85025 COMPLETE CBC W/AUTO DIFF WBC: CPT | Performed by: EMERGENCY MEDICINE

## 2024-02-27 PROCEDURE — 99284 EMERGENCY DEPT VISIT MOD MDM: CPT

## 2024-02-27 PROCEDURE — 83690 ASSAY OF LIPASE: CPT | Performed by: EMERGENCY MEDICINE

## 2024-02-27 PROCEDURE — 36415 COLL VENOUS BLD VENIPUNCTURE: CPT

## 2024-02-27 RX ORDER — SUCRALFATE 1 G/1
1 TABLET ORAL 3 TIMES DAILY PRN
Qty: 40 TABLET | Refills: 0 | Status: SHIPPED | OUTPATIENT
Start: 2024-02-27

## 2024-02-27 RX ORDER — SUCRALFATE 1 G/1
1 TABLET ORAL ONCE
Status: COMPLETED | OUTPATIENT
Start: 2024-02-27 | End: 2024-02-27

## 2024-02-27 RX ORDER — SODIUM CHLORIDE 0.9 % (FLUSH) 0.9 %
10 SYRINGE (ML) INJECTION AS NEEDED
Status: DISCONTINUED | OUTPATIENT
Start: 2024-02-27 | End: 2024-02-28 | Stop reason: HOSPADM

## 2024-02-27 RX ORDER — OMEPRAZOLE 20 MG/1
40 CAPSULE, DELAYED RELEASE ORAL DAILY
Qty: 30 CAPSULE | Refills: 0 | Status: SHIPPED | OUTPATIENT
Start: 2024-02-27

## 2024-02-27 RX ADMIN — SUCRALFATE 1 G: 1 TABLET ORAL at 18:20

## 2024-02-27 NOTE — ED PROVIDER NOTES
EMERGENCY DEPARTMENT ENCOUNTER    Pt Name: Riana Anthony  MRN: [id number]  Pt :   1990  Room Number:  32/32  Date of encounter:  2024  PCP: Alie Messina APRN  ED Provider: Alexandr Corrigan MD    Historian: Patient      HPI:  Chief Complaint: Abdominal pain        Context: Riana Anthony is a 33 y.o. female who presents to the ED c/o abdominal pain mostly in the umbilical region.  She reports onset yesterday and notes that it has been somewhat severe and waxing and waning, achy, mostly in the periumbilical and upper regions.  No lateralizing discomfort.  She denies fevers and chills.  She has had loose stools for weeks.  No blood in her stools.  No vomiting.  She has tried Zantac and Shirley-Rhodes ibuprofen all with no relief.  She does have a history of GERD but does not routinely take her Prilosec.      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  Follows with psychiatry, on Prozac, previously on Zyprexa.    Umbilical hernia          PAST SURGICAL HISTORY  Past Surgical History:   Procedure Laterality Date    ABDOMINOPLASTY N/A 2022    Procedure: ABDOMINOPLASTY WITH LIPOSUCTION;  Surgeon: Frederick Betts MD;  Location:  [facility];  Service: Plastics;  Laterality: N/A;    BREAST AUGMENTATION Bilateral 2022    Procedure: BREAST AUGMENTATION BILATERAL WITH SILICONE IMPLANTS;  Surgeon: Frederick Betts MD;  Location:  [facility];  Service: Plastics;  Laterality: Bilateral;     SECTION PRIMARY  2022    CHOLECYSTECTOMY N/A 2021    Procedure: CHOLECYSTECTOMY LAPAROSCOPIC;  Surgeon: Amrita Roche MD;  Location:  [facility];  Service:  General;  Laterality: N/A;    GASTRIC SLEEVE LAPAROSCOPIC  2019 7/14/19.  [address] [place] [address].  op note rviewed    VENTRAL/INCISIONAL HERNIA REPAIR N/A 9/13/2022    Procedure: VENTRAL HERNIA REPAIR OPEN;  Surgeon: Segundo Morales MD;  Location: [facility] OR;  Service: General;  Laterality: N/A;         FAMILY HISTORY  Family History   Problem Relation Age of Onset    No Known Problems Mother     No Known Problems Father     No Known Problems Sister     No Known Problems Brother     No Known Problems Maternal Grandmother     No Known Problems Maternal Grandfather     No Known Problems Paternal Grandmother     No Known Problems Paternal Grandfather          SOCIAL HISTORY  Social History     Socioeconomic History    Marital status:    Tobacco Use    Smoking status: Never    Smokeless tobacco: Never   Vaping Use    Vaping Use: Former    Substances: Nicotine    Devices: Disposable (pt no longer vapes 7/25/21)    Passive vaping exposure: Yes   Substance and Sexual Activity    Alcohol use: Never    Drug use: Never    Sexual activity: Defer     Partners: Male     Birth control/protection: None         ALLERGIES  Patient has no known allergies.        REVIEW OF SYSTEMS  Review of Systems       All systems reviewed and negative except for those discussed in HPI.       PHYSICAL EXAM    I have reviewed the triage vital signs and nursing notes.    ED Triage Vitals [02/27/24 1805]   Temp Heart Rate Resp BP SpO2   97.6 °F (36.4 °C) 75 16 94/67 96 %      Temp src Heart Rate Source Patient Position BP Location FiO2 (%)   Oral Monitor Sitting Left arm --       Physical Exam  GENERAL:   Appears uncomfortable but nontoxic as I see her in our Pit area as we have no rooms for immediate bedding  HENT: Nares patent.  EYES: No scleral icterus.  CV: Regular rhythm, regular rate.  No murmurs gallops rubs  RESPIRATORY: Normal effort.  No audible wheezes, rales or rhonchi.  Clear to auscultation  ABDOMEN: Soft, tenderness in the mid  abdomen just above the umbilicus.  Surgical scars appear clean dry and intact without any signs of infection.  MUSCULOSKELETAL: No deformities.   NEURO: Alert, moves all extremities, follows commands.  SKIN: Warm, dry, no rash visualized.      LAB RESULTS  Recent Results (from the past 24 hour(s))   Comprehensive Metabolic Panel    Collection Time: 02/27/24  6:23 PM    Specimen: Blood   Result Value Ref Range    Glucose 103 (H) 65 - 99 mg/dL    BUN 9 6 - 20 mg/dL    Creatinine 0.88 0.57 - 1.00 mg/dL    Sodium 138 136 - 145 mmol/L    Potassium 4.3 3.5 - 5.2 mmol/L    Chloride 107 98 - 107 mmol/L    CO2 22.0 22.0 - 29.0 mmol/L    Calcium 8.9 8.6 - 10.5 mg/dL    Total Protein 7.6 6.0 - 8.5 g/dL    Albumin 4.0 3.5 - 5.2 g/dL    ALT (SGPT) 68 (H) 1 - 33 U/L    AST (SGOT) 67 (H) 1 - 32 U/L    Alkaline Phosphatase 168 (H) 39 - 117 U/L    Total Bilirubin 0.2 0.0 - 1.2 mg/dL    Globulin 3.6 gm/dL    A/G Ratio 1.1 g/dL    BUN/Creatinine Ratio 10.2 7.0 - 25.0    Anion Gap 9.0 5.0 - 15.0 mmol/L    eGFR 89.7 >60.0 mL/min/1.73   Lipase    Collection Time: 02/27/24  6:23 PM    Specimen: Blood   Result Value Ref Range    Lipase 28 13 - 60 U/L   Green Top (Gel)    Collection Time: 02/27/24  6:23 PM   Result Value Ref Range    Extra Tube Hold for add-ons.    Lavender Top    Collection Time: 02/27/24  6:23 PM   Result Value Ref Range    Extra Tube hold for add-on    Gold Top - SST    Collection Time: 02/27/24  6:23 PM   Result Value Ref Range    Extra Tube Hold for add-ons.    Light Blue Top    Collection Time: 02/27/24  6:23 PM   Result Value Ref Range    Extra Tube Hold for add-ons.    CBC Auto Differential    Collection Time: 02/27/24  6:23 PM    Specimen: Blood   Result Value Ref Range    WBC 10.21 3.40 - 10.80 10*3/mm3    RBC 4.75 3.77 - 5.28 10*6/mm3    Hemoglobin 13.8 12.0 - 15.9 g/dL    Hematocrit 41.3 34.0 - 46.6 %    MCV 86.9 79.0 - 97.0 fL    MCH 29.1 26.6 - 33.0 pg    MCHC 33.4 31.5 - 35.7 g/dL    RDW 12.7 12.3 - 15.4 %     RDW-SD 40.1 37.0 - 54.0 fl    MPV 10.1 6.0 - 12.0 fL    Platelets 404 140 - 450 10*3/mm3    Neutrophil % 58.0 42.7 - 76.0 %    Lymphocyte % 34.5 19.6 - 45.3 %    Monocyte % 5.0 5.0 - 12.0 %    Eosinophil % 1.7 0.3 - 6.2 %    Basophil % 0.5 0.0 - 1.5 %    Immature Grans % 0.3 0.0 - 0.5 %    Neutrophils, Absolute 5.93 1.70 - 7.00 10*3/mm3    Lymphocytes, Absolute 3.52 (H) 0.70 - 3.10 10*3/mm3    Monocytes, Absolute 0.51 0.10 - 0.90 10*3/mm3    Eosinophils, Absolute 0.17 0.00 - 0.40 10*3/mm3    Basophils, Absolute 0.05 0.00 - 0.20 10*3/mm3    Immature Grans, Absolute 0.03 0.00 - 0.05 10*3/mm3    nRBC 0.0 0.0 - 0.2 /100 WBC   hCG, Quantitative, Pregnancy    Collection Time: 02/27/24  6:23 PM    Specimen: Blood   Result Value Ref Range    HCG Quantitative <0.10 mIU/mL   Urinalysis With Microscopic If Indicated (No Culture) - Urine, Clean Catch    Collection Time: 02/27/24  6:59 PM    Specimen: Urine, Clean Catch   Result Value Ref Range    Color, UA Yellow Yellow, Straw    Appearance, UA Clear Clear    pH, UA 6.0 5.0 - 8.0    Specific Gravity, UA 1.008 1.001 - 1.030    Glucose, UA Negative Negative    Ketones, UA Negative Negative    Bilirubin, UA Negative Negative    Blood, UA Small (1+) (A) Negative    Protein, UA Negative Negative    Leuk Esterase, UA Negative Negative    Nitrite, UA Negative Negative    Urobilinogen, UA 0.2 E.U./dL 0.2 - 1.0 E.U./dL   Urinalysis, Microscopic Only - Urine, Clean Catch    Collection Time: 02/27/24  6:59 PM    Specimen: Urine, Clean Catch   Result Value Ref Range    RBC, UA 3-5 (A) None Seen, 0-2 /HPF    WBC, UA 0-2 None Seen, 0-2 /HPF    Bacteria, UA None Seen None Seen, Trace /HPF    Squamous Epithelial Cells, UA None Seen None Seen, 0-2 /HPF    Hyaline Casts, UA None Seen 0 - 6 /LPF    Methodology Automated Microscopy    POC Urine Pregnancy    Collection Time: 02/27/24  7:04 PM    Specimen: Urine   Result Value Ref Range    HCG, Urine, QL Negative Negative    Lot Number [id number]      Internal Positive Control Positive Positive, Passed    Internal Negative Control Negative Negative, Passed    Expiration Date 1,581,400        If labs were ordered, I independently reviewed the results and considered them in treating the patient.        RADIOLOGY  CT Abdomen Pelvis Without Contrast    Result Date: 2/27/2024  CT ABDOMEN PELVIS WO CONTRAST Date of Exam: 2/27/2024 6:56 PM EST Indication: periumb pain with hx of hernia surg/ccy. Comparison: 7/30/2022 Technique: Axial CT images were obtained of the abdomen and pelvis without the administration of contrast. Reconstructed coronal and sagittal images were also obtained. Automated exposure control and iterative construction methods were used. Findings: Prior study report from 7/30/2022 noted postoperative changes of prior laparotomy. Small hernia, no other acute findings. On today's study, the included lower lungs appear grossly clear. There are bilateral breast prostheses. There is fatty liver change and grossly normal liver morphology. Clips are seen in the gallbladder fossa. Spleen is not enlarged. No significant abnormalities are appreciated of the adrenal glands or kidneys for a non-IV contrast enhanced exam. What initially appears to represent a mildly thickened appearance of the pancreatic tail, with reference to the prior contrast-enhanced scan, actually represents the pancreas and adjacent nonopacified splenic vein. Pancreas appears essentially normal. No upper abdominal free air, ascites or adenopathy is seen. There is evidence of previous gastric sleeve surgery or other similar surgery. No upper abdominal free air, ascites, adenopathy, or acute inflammatory change is seen. Regarding the the lower abdomen and pelvis, there is minimal linear scarring of the anterior abdominal wall. No herniation or fascial defect suggestive of potential hernia site is seen. No fluid collection or inflammatory focus is appreciated. Uterus and  ovaries appear within  normal limits. Bladder is nondistended. Terminal ileum cecum and appendix appear normal. Bony structures appear to be intact..     Impression: 1. Mild midline abdominal wall scarring, expected for previous surgery. No visible hernia. Other postsurgical changes elsewhere as noted. 2. No evidence of acute intra-abdominal or intrapelvic disease is seen. If the patient's abdominal symptoms persist or worsen, oral and IV contrast enhanced exam could be considered. Electronically Signed: Abdelrahman Gimenez MD  2/27/2024 7:23 PM EST  Workstation ID: [id number]     I ordered and independently reviewed the above noted radiographic studies.      I viewed images of CT abdomen pelvis which showed no acute abnormalities per my independent interpretation.    See radiologist's dictation for official interpretation.        PROCEDURES    Procedures    No orders to display       MEDICATIONS GIVEN IN ER    Medications   sodium chloride 0.9 % flush 10 mL (has no administration in time range)   sucralfate (CARAFATE) tablet 1 g (1 g Oral Given 2/27/24 1820)         MEDICAL DECISION MAKING, PROGRESS, and CONSULTS    All labs, if obtained, have been independently reviewed by me.  All radiology studies, if obtained, have been reviewed by me and the radiologist dictating the report.  All EKG's, if obtained, have been independently viewed and interpreted by me/my attending physician.      Discussion below represents my analysis of pertinent findings related to patient's condition, differential diagnosis, treatment plan and final disposition.                         Differential diagnosis:    Gastritis versus esophagitis versus peptic ulcer disease versus pancreatitis, etc.      Additional sources:      - External (non-ED) record review: Reviewed multiple records on this patient to include an urgent care note for dysuria and dated 6/3/2023.  I reviewed CT scan of the abdomen and pelvis with IV contrast on 7/30/2022 which showed body wall soft tissue  minimal linear midline scarring and trace edema.    - Chronic or social conditions impacting care: Obesity with BMI 35.5    - Shared decision making: Patient in full agreement with current plans for evaluation and treatment      Orders placed during this visit:  Orders Placed This Encounter   Procedures    CT Abdomen Pelvis Without Contrast    [place] Draw    Comprehensive Metabolic Panel    Lipase    Urinalysis With Microscopic If Indicated (No Culture) - Urine, Clean Catch    CBC Auto Differential    hCG, Quantitative, Pregnancy    Urinalysis, Microscopic Only - Urine, Clean Catch    POC Urine Pregnancy    Insert Peripheral IV    CBC & Differential    Green Top (Gel)    Lavender Top    Gold Top - SST    Gray Top    Light Blue Top         Additional orders considered but not ordered:  IV contrasted CT scan of the abdomen pelvis    ED Course:    Consultants:      ED Course as of 02/27/24 2156 Tue Feb 27, 2024 1817 I have ordered a dose of sucralfate dissolved.  I have contacted will our pharmacist to get this administered.  I am seeing the patient in the triage area and we will be coordinating care for this patient here. [MS]   2153 Patient received Carafate and feels much improved.  I spoke with her in detail about discharge instructions and she is comfortable with this. [MS]      ED Course User Index  [MS] Alexandr Corrigan MD              Shared Decision Making:  After my consideration of clinical presentation and any laboratory/radiology studies obtained, I discussed the findings with the patient/patient representative who is in agreement with the treatment plan and the final disposition.   Risks and benefits of discharge and/or observation/admission were discussed.       AS OF 21:56 EST VITALS:    BP - 97/55  HR - 64  TEMP - 97.6 °F (36.4 °C) (Oral)  O2 SATS - 95%                  DIAGNOSIS  Final diagnoses:   Nonspecific abdominal pain   Acute gastritis without hemorrhage, unspecified gastritis type          DISPOSITION  DISCHARGE    Patient discharged in stable condition.    Reviewed implications of results, diagnosis, meds, responsibility to follow up, warning signs and symptoms of possible worsening, potential complications and reasons to return to ER.    Patient/Family voiced understanding of above instructions.    Discussed plan for discharge, as there is no emergent indication for admission.  Pt/family is agreeable and understands need for follow up and possible repeat testing.  Pt/family is aware that discharge does not mean that nothing is wrong but that it indicates no emergency is currently present that requires admission and they must continue care with follow-up as given below or with a physician of their choice.     FOLLOW-UP  Alie Messina, APRN  [address]  [address]  [place] [address]  [phone]      NEXT AVAILABLE APPOINTMENT - RECHECK OF CONDITION    [facility] EMERGENCY DEPARTMENT  [address]  [facility] [address] [phone]    IF YOU HAVE ANY CONCERN OF WORSENING CONDITION         Medication List        New Prescriptions      sucralfate 1 g tablet  Commonly known as: CARAFATE  Take 1 tablet by mouth 3 (Three) Times a Day As Needed (Abdominal discomfort). Dissolve in small amount of liquid for 15 minutes prior to consuming.            Changed      omeprazole 20 MG capsule  Commonly known as: priLOSEC  Take 2 capsules by mouth Daily.  What changed:   medication strength  See the new instructions.               Where to Get Your Medications        These medications were sent to [facility] [id number] - [place], [place] - [address] [facility] AT [facility] - [phone]  - [phone]   [address] [address], [facility] [address]      Phone: [phone]   omeprazole 20 MG capsule  sucralfate 1 g tablet             Please note that portions of this document were completed with voice recognition software.        Alexandr Corrigan MD  02/28/24  0146

## 2024-03-27 ENCOUNTER — TELEPHONE (OUTPATIENT)
Dept: GASTROENTEROLOGY | Facility: CLINIC | Age: 34
End: 2024-03-27

## 2024-03-29 ENCOUNTER — LAB (OUTPATIENT)
Dept: LAB | Facility: HOSPITAL | Age: 34
End: 2024-03-29
Payer: MEDICAID

## 2024-03-29 ENCOUNTER — OFFICE VISIT (OUTPATIENT)
Dept: FAMILY MEDICINE CLINIC | Facility: CLINIC | Age: 34
End: 2024-03-29
Payer: MEDICAID

## 2024-03-29 VITALS
TEMPERATURE: 97.3 F | OXYGEN SATURATION: 98 % | DIASTOLIC BLOOD PRESSURE: 76 MMHG | HEIGHT: 66 IN | BODY MASS INDEX: 35.68 KG/M2 | SYSTOLIC BLOOD PRESSURE: 114 MMHG | WEIGHT: 222 LBS | RESPIRATION RATE: 21 BRPM | HEART RATE: 91 BPM

## 2024-03-29 DIAGNOSIS — R14.0 GENERALIZED BLOATING: ICD-10-CM

## 2024-03-29 DIAGNOSIS — R14.0 GENERALIZED BLOATING: Primary | ICD-10-CM

## 2024-03-29 DIAGNOSIS — R10.84 GENERALIZED ABDOMINAL PAIN: ICD-10-CM

## 2024-03-29 DIAGNOSIS — K21.9 GASTROESOPHAGEAL REFLUX DISEASE, UNSPECIFIED WHETHER ESOPHAGITIS PRESENT: ICD-10-CM

## 2024-03-29 LAB
ALBUMIN SERPL-MCNC: 4.2 G/DL (ref 3.5–5.2)
ALBUMIN/GLOB SERPL: 1.3 G/DL
ALP SERPL-CCNC: 159 U/L (ref 39–117)
ALT SERPL W P-5'-P-CCNC: 34 U/L (ref 1–33)
AMYLASE SERPL-CCNC: 55 U/L (ref 28–100)
ANION GAP SERPL CALCULATED.3IONS-SCNC: 12.5 MMOL/L (ref 5–15)
AST SERPL-CCNC: 32 U/L (ref 1–32)
BASOPHILS # BLD AUTO: 0.06 10*3/MM3 (ref 0–0.2)
BASOPHILS NFR BLD AUTO: 0.5 % (ref 0–1.5)
BILIRUB SERPL-MCNC: 0.5 MG/DL (ref 0–1.2)
BUN SERPL-MCNC: 10 MG/DL (ref 6–20)
BUN/CREAT SERPL: 12.5 (ref 7–25)
CALCIUM SPEC-SCNC: 9.1 MG/DL (ref 8.6–10.5)
CHLORIDE SERPL-SCNC: 104 MMOL/L (ref 98–107)
CO2 SERPL-SCNC: 19.5 MMOL/L (ref 22–29)
CREAT SERPL-MCNC: 0.8 MG/DL (ref 0.57–1)
DEPRECATED RDW RBC AUTO: 42.8 FL (ref 37–54)
EGFRCR SERPLBLD CKD-EPI 2021: 100.5 ML/MIN/1.73
EOSINOPHIL # BLD AUTO: 0.12 10*3/MM3 (ref 0–0.4)
EOSINOPHIL NFR BLD AUTO: 1 % (ref 0.3–6.2)
ERYTHROCYTE [DISTWIDTH] IN BLOOD BY AUTOMATED COUNT: 13.5 % (ref 12.3–15.4)
GLOBULIN UR ELPH-MCNC: 3.3 GM/DL
GLUCOSE SERPL-MCNC: 91 MG/DL (ref 65–99)
HCT VFR BLD AUTO: 42.1 % (ref 34–46.6)
HGB BLD-MCNC: 14 G/DL (ref 12–15.9)
IMM GRANULOCYTES # BLD AUTO: 0.04 10*3/MM3 (ref 0–0.05)
IMM GRANULOCYTES NFR BLD AUTO: 0.3 % (ref 0–0.5)
LIPASE SERPL-CCNC: 23 U/L (ref 13–60)
LYMPHOCYTES # BLD AUTO: 3.81 10*3/MM3 (ref 0.7–3.1)
LYMPHOCYTES NFR BLD AUTO: 32.4 % (ref 19.6–45.3)
MCH RBC QN AUTO: 28.8 PG (ref 26.6–33)
MCHC RBC AUTO-ENTMCNC: 33.3 G/DL (ref 31.5–35.7)
MCV RBC AUTO: 86.6 FL (ref 79–97)
MONOCYTES # BLD AUTO: 0.59 10*3/MM3 (ref 0.1–0.9)
MONOCYTES NFR BLD AUTO: 5 % (ref 5–12)
NEUTROPHILS NFR BLD AUTO: 60.8 % (ref 42.7–76)
NEUTROPHILS NFR BLD AUTO: 7.15 10*3/MM3 (ref 1.7–7)
NRBC BLD AUTO-RTO: 0 /100 WBC (ref 0–0.2)
PLATELET # BLD AUTO: 429 10*3/MM3 (ref 140–450)
PMV BLD AUTO: 10.6 FL (ref 6–12)
POTASSIUM SERPL-SCNC: 4.2 MMOL/L (ref 3.5–5.2)
PROT SERPL-MCNC: 7.5 G/DL (ref 6–8.5)
RBC # BLD AUTO: 4.86 10*6/MM3 (ref 3.77–5.28)
SODIUM SERPL-SCNC: 136 MMOL/L (ref 136–145)
WBC NRBC COR # BLD AUTO: 11.77 10*3/MM3 (ref 3.4–10.8)

## 2024-03-29 PROCEDURE — 80053 COMPREHEN METABOLIC PANEL: CPT

## 2024-03-29 PROCEDURE — 36415 COLL VENOUS BLD VENIPUNCTURE: CPT

## 2024-03-29 PROCEDURE — 83013 H PYLORI (C-13) BREATH: CPT

## 2024-03-29 PROCEDURE — 83690 ASSAY OF LIPASE: CPT | Performed by: NURSE PRACTITIONER

## 2024-03-29 PROCEDURE — 85025 COMPLETE CBC W/AUTO DIFF WBC: CPT

## 2024-03-29 PROCEDURE — 82150 ASSAY OF AMYLASE: CPT

## 2024-03-29 RX ORDER — PANTOPRAZOLE SODIUM 40 MG/1
40 TABLET, DELAYED RELEASE ORAL DAILY
Qty: 30 TABLET | Refills: 2 | Status: SHIPPED | OUTPATIENT
Start: 2024-03-29

## 2024-03-29 RX ORDER — SUCRALFATE 1 G/1
1 TABLET ORAL 3 TIMES DAILY PRN
Qty: 40 TABLET | Refills: 0 | Status: SHIPPED | OUTPATIENT
Start: 2024-03-29

## 2024-03-29 NOTE — PROGRESS NOTES
Chief Complaint  Abdominal Pain (Pt is having pain near belly button. Pt has hx of GERD but is unable to get into gastro until June.  Pt needs refil on omeprazole and carafate. ) and Med Refill    Subjective          Riana Anthony presents to [facility]  History of Present Illness  Patient is a 33-year-old female.  She is here for complaint of generalized abdominal pain around her umbilicus area.  She states it has been off-and-on for the last year.  She does state it just comes and goes.  She complains of diarrhea, nausea, flatus.    She denies any current constipation.  She denies fever or vomiting.    She states she felt better after completing carafate 02/27/24. She has hx of GERD - will take occasional Prilosec. Will check labs, retreat with carafate. Change to protonix.   She has a hx of abdominoplasty with liposuction, ventral hernia repair and bilateral breast augmentation with bilateral silicone implants 09/23. Dr.s Bob, Andrew Tejada Gustavo, and Dr. Frederick Betts.            Abdominal Pain  This is a recurrent problem. The current episode started more than 1 year ago. The onset quality is sudden. The problem occurs daily. The problem has been coming and going. The pain is located in the periumbilical region. The pain is at a severity of 7/10. The quality of the pain is cramping. The abdominal pain radiates to the periumbilical region. Associated symptoms include belching, diarrhea, flatus, frequency and nausea. Pertinent negatives include no anorexia, arthralgias, constipation, dysuria, fever, hematochezia, hematuria, melena, myalgias, vomiting or weight loss. The pain is aggravated by certain positions and eating. The pain is relieved by Belching and passing flatus. Prior diagnostic workup includes CT scan and surgery.       The following portions of the patient's history were reviewed and updated as appropriate: allergies, current medications, past family  "history, past medical history, past social history, past surgical history and problem list.    Review of Systems   Constitutional:  Negative for fever and weight loss.   Gastrointestinal:  Positive for abdominal pain, diarrhea, flatus and nausea. Negative for anorexia, constipation, hematochezia, melena and vomiting.   Genitourinary:  Positive for frequency. Negative for dysuria and hematuria.   Musculoskeletal:  Negative for arthralgias and myalgias.         Objective   Vital Signs:   /76   Pulse 91   Temp 97.3 °F (36.3 °C) (Temporal)   Resp 21   Ht 167.6 cm (65.98\")   Wt 101 kg (222 lb)   SpO2 98%   BMI 35.85 kg/m²    Class 2 Severe Obesity (BMI >=35 and <=39.9). Obesity-related health conditions include the following: GERD. Obesity is unchanged. BMI is is above average; BMI management plan is completed. We discussed portion control and increasing exercise.      PHQ-2/9 Depression Screening  PHQ-9 Total Score: 0            Physical Exam   Result Review :            CT Abdomen Pelvis Without Contrast (02/27/2024 19:01)      Assessment and Plan    Diagnoses and all orders for this visit:    1. Generalized bloating (Primary)  -     H. Pylori Breath Test - , Lung; Future  -     Lipase  -     Amylase; Future  -     Comprehensive Metabolic Panel; Future  -     CBC & Differential; Future  -     Ambulatory Referral to Gastroenterology    2. Gastroesophageal reflux disease, unspecified whether esophagitis present  -     H. Pylori Breath Test - , Lung; Future  -     Lipase  -     Amylase; Future  -     Comprehensive Metabolic Panel; Future  -     CBC & Differential; Future  -     Ambulatory Referral to Gastroenterology    3. Generalized abdominal pain  -     H. Pylori Breath Test - , Lung; Future  -     Lipase  -     Amylase; Future  -     Comprehensive Metabolic Panel; Future  -     CBC & Differential; Future  -     Ambulatory Referral to Gastroenterology    Other orders  -     pantoprazole (PROTONIX) 40 MG EC " tablet; Take 1 tablet by mouth Daily.  Dispense: 30 tablet; Refill: 2  -     sucralfate (CARAFATE) 1 g tablet; Take 1 tablet by mouth 3 (Three) Times a Day As Needed (Abdominal discomfort). Dissolve in small amount of liquid for 15 minutes prior to consuming.  Dispense: 40 tablet; Refill: 0    Follow up in 2 weeks.   Adding Protonix 40 mg daily, Carafate tid x 10 days,   Simethicone prn once for flatus/ bloating.   Take a daily probiotic.   Referral to GI for GERD bloating/ periumbilical pain.     Checking labs.   Go to ER for any severe abdominal pain or high fever.             Follow Up   No follow-ups on file.  Patient was given instructions and counseling regarding her condition or for health maintenance advice. Please see specific information pulled into the AVS if appropriate.       Answers submitted by the patient for this visit:  Primary Reason for Visit (Submitted on 3/28/2024)  What is the primary reason for your visit?: Abdominal Pain

## 2024-03-31 LAB — UREA BREATH TEST QL: NEGATIVE

## 2024-04-08 ENCOUNTER — TELEPHONE (OUTPATIENT)
Dept: FAMILY MEDICINE CLINIC | Facility: CLINIC | Age: 34
End: 2024-04-08

## 2024-04-09 ENCOUNTER — LAB (OUTPATIENT)
Dept: LAB | Facility: HOSPITAL | Age: 34
End: 2024-04-09
Payer: MEDICAID

## 2024-04-09 ENCOUNTER — OFFICE VISIT (OUTPATIENT)
Dept: FAMILY MEDICINE CLINIC | Facility: CLINIC | Age: 34
End: 2024-04-09
Payer: MEDICAID

## 2024-04-09 VITALS
HEIGHT: 66 IN | TEMPERATURE: 97.8 F | HEART RATE: 90 BPM | BODY MASS INDEX: 35.45 KG/M2 | DIASTOLIC BLOOD PRESSURE: 64 MMHG | WEIGHT: 220.6 LBS | RESPIRATION RATE: 25 BRPM | SYSTOLIC BLOOD PRESSURE: 108 MMHG

## 2024-04-09 DIAGNOSIS — Z34.91 FIRST TRIMESTER PREGNANCY: ICD-10-CM

## 2024-04-09 DIAGNOSIS — K21.9 GASTROESOPHAGEAL REFLUX DISEASE, UNSPECIFIED WHETHER ESOPHAGITIS PRESENT: Primary | ICD-10-CM

## 2024-04-09 LAB — HCG INTACT+B SERPL-ACNC: 1237 MIU/ML

## 2024-04-09 PROCEDURE — 1159F MED LIST DOCD IN RCRD: CPT | Performed by: NURSE PRACTITIONER

## 2024-04-09 PROCEDURE — 84702 CHORIONIC GONADOTROPIN TEST: CPT

## 2024-04-09 PROCEDURE — 99213 OFFICE O/P EST LOW 20 MIN: CPT | Performed by: NURSE PRACTITIONER

## 2024-04-09 PROCEDURE — 1160F RVW MEDS BY RX/DR IN RCRD: CPT | Performed by: NURSE PRACTITIONER

## 2024-04-09 RX ORDER — PRENATAL WITH FERROUS FUM AND FOLIC ACID 3080; 920; 120; 400; 22; 1.84; 3; 20; 10; 1; 12; 200; 27; 25; 2 [IU]/1; [IU]/1; MG/1; [IU]/1; MG/1; MG/1; MG/1; MG/1; MG/1; MG/1; UG/1; MG/1; MG/1; MG/1; MG/1
1 TABLET ORAL DAILY
Qty: 90 TABLET | Refills: 3 | Status: SHIPPED | OUTPATIENT
Start: 2024-04-09

## 2024-04-09 RX ORDER — FAMOTIDINE 20 MG/1
20 TABLET, FILM COATED ORAL 2 TIMES DAILY
Qty: 60 TABLET | Refills: 5 | Status: SHIPPED | OUTPATIENT
Start: 2024-04-09

## 2024-04-09 NOTE — PROGRESS NOTES
"Chief Complaint  Possible Pregnancy    Subjective          Riana Anthony presents to [facility]  History of Present Illness  Patient is a 32 yo female. She is here for possible pregnancy. She states has taken a home pregnancy test. She has taken 3 home pregnancy test all three were positive.   She is needing a referral to see OB/Gyn Dr. García at [facility]   Her last menstrual cycle was 03/04/2024.     Possible Pregnancy  Associated symptoms include fatigue.       The following portions of the patient's history were reviewed and updated as appropriate: allergies, current medications, past family history, past medical history, past social history, past surgical history and problem list.    Review of Systems   Constitutional:  Positive for fatigue.   HENT: Negative.     Respiratory: Negative.     Cardiovascular: Negative.    Gastrointestinal: Negative.    Genitourinary: Negative.         Positive home pregnancy test.   Musculoskeletal: Negative.    Allergic/Immunologic: Negative.    Neurological: Negative.    Hematological: Negative.    Psychiatric/Behavioral: Negative.           Objective   Vital Signs:   /64 (BP Location: Left arm, Patient Position: Sitting, Cuff Size: Adult)   Pulse 90   Temp 97.8 °F (36.6 °C) (Infrared)   Resp 25   Ht 167.6 cm (65.98\")   Wt 100 kg (220 lb 9.6 oz)   BMI 35.63 kg/m²           Physical Exam  Vitals reviewed.   HENT:      Nose: Nose normal.   Cardiovascular:      Rate and Rhythm: Normal rate and regular rhythm.      Pulses: Normal pulses.      Heart sounds: Normal heart sounds.   Pulmonary:      Effort: Pulmonary effort is normal.      Breath sounds: Normal breath sounds.   Abdominal:      General: Bowel sounds are normal.      Palpations: Abdomen is soft.   Skin:     General: Skin is warm and dry.      Capillary Refill: Capillary refill takes less than 2 seconds.   Neurological:      Mental Status: She is alert.   Psychiatric:   "       Mood and Affect: Mood normal.         Behavior: Behavior normal.        Result Review :                 Assessment and Plan    Diagnoses and all orders for this visit:    1. Gastroesophageal reflux disease, unspecified whether esophagitis present (Primary)  -     famotidine (Pepcid) 20 MG tablet; Take 1 tablet by mouth 2 (Two) Times a Day.  Dispense: 60 tablet; Refill: 5    2. First trimester pregnancy  -     famotidine (Pepcid) 20 MG tablet; Take 1 tablet by mouth 2 (Two) Times a Day.  Dispense: 60 tablet; Refill: 5  -     hCG, Quantitative, Pregnancy; Future  -     Ambulatory Referral to Obstetrics / Gynecology  -     Prenatal Vit-Fe Fumarate-FA (Prenatal 27-1) 27-1 MG tablet tablet; Take 1 tablet by mouth Daily.  Dispense: 90 tablet; Refill: 3    Checking labs:    Referral OB  Prenatal vitamin   Stop protonix. Will order Pepcid.   TUMS prn.       Follow Up   Return in about 6 months (around 10/9/2024).  Patient was given instructions and counseling regarding her condition or for health maintenance advice. Please see specific information pulled into the AVS if appropriate.

## 2024-04-10 ENCOUNTER — TELEPHONE (OUTPATIENT)
Dept: FAMILY MEDICINE CLINIC | Facility: CLINIC | Age: 34
End: 2024-04-10

## 2024-04-16 ENCOUNTER — TELEPHONE (OUTPATIENT)
Dept: OBSTETRICS AND GYNECOLOGY | Facility: CLINIC | Age: 34
End: 2024-04-16
Payer: MEDICAID

## 2024-04-16 NOTE — TELEPHONE ENCOUNTER
Returned patient's call. Informed her that Dr. Stroud states that her BV result does not need treatment. She would like to know what she can do to relieve her symptoms?

## 2024-04-16 NOTE — TELEPHONE ENCOUNTER
"Spoke with patient. Informed her that Dr. Stroud stated \"Lactobaccilus is not considered BV.  She can use warm compress and bathing in tub may help.\" She v/u and agreed.   "

## 2024-04-16 NOTE — TELEPHONE ENCOUNTER
Patient went to an [facility] over the weekend and was told had BV, The [facility] wouldn't treat her because she is pregnant. Patient would like to know if Dr. Stroud can send her something in.

## 2024-04-16 NOTE — TELEPHONE ENCOUNTER
Returned patient's call. Patient has not been seen in this office before but is scheduled for NOB visit with Dr. Stroud 05/01/24.   LMP 03/06/2024 = 5w 6d  Patient was seen @ [facility] 04/14/24 with vaginal itching, burning, and white discharge. BV panel was negative for yeast but positive for Lactobacillus crispatus; [facility] declined to treat due to pregnancy and instructed patient to contact her OB.   Denies any known allergies.   Denies any bleeding, cramping, or other symptoms.   Informed patient I will send this message to Dr. Stroud and will call her back with his instructions. She v/u and agreed.

## 2024-04-22 ENCOUNTER — TELEPHONE (OUTPATIENT)
Dept: OBSTETRICS AND GYNECOLOGY | Facility: CLINIC | Age: 34
End: 2024-04-22
Payer: MEDICAID

## 2024-04-22 DIAGNOSIS — O21.9 NAUSEA AND VOMITING DURING PREGNANCY: Primary | ICD-10-CM

## 2024-04-22 RX ORDER — PROMETHAZINE HYDROCHLORIDE 12.5 MG/1
12.5 TABLET ORAL EVERY 6 HOURS PRN
Qty: 30 TABLET | Refills: 3 | Status: SHIPPED | OUTPATIENT
Start: 2024-04-22

## 2024-04-22 NOTE — TELEPHONE ENCOUNTER
Dr. Stroud NOB pt.   LMP: 3/6/2024  NOB appt: 5/1 with [facility]    S/w pt she states she has been having constant nausea and vomiting and vomits about 4-5x per day. Patient states she has been eating small frequent meals and trying OTC medication to help with this and it has not helped at all. Patient has increased her water intake as well and states with her last pregnancy she had to get IV fluids for this and was wondering if she should go ahead and get an order for IV fluids or if she needs to try something else.     Patient denies: vaginal bleeding, LOF, HA's, vision changes, severe pelvic pain/cramping    I told the patient I would discuss this with Dr. Stroud and CB with plan of care. She v/u

## 2024-04-22 NOTE — TELEPHONE ENCOUNTER
Since I've never seen her, lets start a little more conservative.  Also, let her know that I'm a man and confirm that she is not wanting to see a female provider. She can start off with promethazine 12.5mg PO Q 6 hours prn. Dispense 30 with 3 refills and confirm she has a new OB appointment set up with US.

## 2024-04-22 NOTE — TELEPHONE ENCOUNTER
Per Dr. Stroud:   Since I've never seen her, lets start a little more conservative.  Also, let her know that I'm a man and confirm that she is not wanting to see a female provider. She can start off with promethazine 12.5mg PO Q 6 hours prn. Dispense 30 with 3 refills and confirm she has a new OB appointment set up with [facility].           S/w pt she v/u Dr. Rincon plan of care and states she is okay with seeing a male provider and requests RX to go to [facility] [address] in [place].     NOB appt w/ ultrasound before has already been schedule and pt. V/u appt info.     RX sent.

## 2024-05-01 ENCOUNTER — INITIAL PRENATAL (OUTPATIENT)
Dept: OBSTETRICS AND GYNECOLOGY | Facility: CLINIC | Age: 34
End: 2024-05-01
Payer: MEDICAID

## 2024-05-01 VITALS — WEIGHT: 212 LBS | DIASTOLIC BLOOD PRESSURE: 76 MMHG | SYSTOLIC BLOOD PRESSURE: 118 MMHG | BODY MASS INDEX: 35.28 KG/M2

## 2024-05-01 DIAGNOSIS — Z3A.08 8 WEEKS GESTATION OF PREGNANCY: Primary | ICD-10-CM

## 2024-05-01 DIAGNOSIS — Z98.891 HISTORY OF CLASSICAL CESAREAN SECTION: ICD-10-CM

## 2024-05-01 DIAGNOSIS — O21.0 MILD HYPEREMESIS GRAVIDARUM: ICD-10-CM

## 2024-05-01 PROBLEM — O35.8XX0: Status: RESOLVED | Noted: 2022-03-08 | Resolved: 2024-05-01

## 2024-05-01 RX ORDER — ONDANSETRON 4 MG/1
4 TABLET, ORALLY DISINTEGRATING ORAL EVERY 8 HOURS PRN
Qty: 30 TABLET | Refills: 3 | Status: SHIPPED | OUTPATIENT
Start: 2024-05-01 | End: 2024-05-02

## 2024-05-01 NOTE — PATIENT INSTRUCTIONS
Prenatal Care  Prenatal care is health care during pregnancy. It helps you and your unborn baby (fetus) stay as healthy as possible. Prenatal care may be provided by a midwife, a family practice doctor, a mid-level practitioner (nurse practitioner or physician assistant), or a childbirth and pregnancy doctor (obstetrician).  How does this affect me?  During pregnancy, you will be closely monitored for any new conditions that might develop. To lower your risk of pregnancy complications, you and your health care provider will talk about any underlying conditions you have.  How does this affect my baby?  Early and consistent prenatal care increases the chance that your baby will be healthy during pregnancy. Prenatal care lowers the risk that your baby will be:  Born early (prematurely).  Smaller than expected at birth (small for gestational age).  What can I expect at the first prenatal care visit?  Your first prenatal care visit will likely be the longest. You should schedule your first prenatal care visit as soon as you know that you are pregnant. Your first visit is a good time to talk about any questions or concerns you have about pregnancy.  Medical history  At your visit, you and your health care provider will talk about your medical history, including:  Any past pregnancies.  Your family's medical history.  Medical history of the baby's father.  Any long-term (chronic) health conditions you have and how you manage them.  Any surgeries or procedures you have had.  Any current over-the-counter or prescription medicines, herbs, or supplements that you are taking.  Other factors that could pose a risk to your baby, including:  Exposure to harmful chemicals or radiation at work or at home.  Any substance use, including tobacco, alcohol, and drug use.  Your home setting and your stress levels, including:  Exposure to abuse or violence.  Household financial strain.  Your daily health habits, including diet and  exercise.  Tests and screenings  Your health care provider will:  Measure your weight, height, and blood pressure.  Do a physical exam, including a pelvic and breast exam.  Perform blood tests and urine tests to check for:  Urinary tract infection.  Sexually transmitted infections (STIs).  Low iron levels in your blood (anemia).  Blood type and certain proteins on red blood cells (Rh antibodies).  Infections and immunity to viruses, such as hepatitis B and rubella.  HIV (human immunodeficiency virus).  Discuss your options for genetic screening.  Tips about staying healthy  Your health care provider will also give you information about how to keep yourself and your baby healthy, including:  Nutrition and taking vitamins.  Physical activity.  How to manage pregnancy symptoms such as nausea and vomiting (morning sickness).  Infections and substances that may be harmful to your baby and how to avoid them.  Food safety.  Dental care.  Working.  Travel.  Warning signs to watch for and when to call your health care provider.  How often will I have prenatal care visits?  After your first prenatal care visit, you will have regular visits throughout your pregnancy. The visit schedule is often as follows:  Up to week 28 of pregnancy: once every 4 weeks.  28-36 weeks: once every 2 weeks.  After 36 weeks: every week until delivery.  Some women may have visits more or less often depending on any underlying health conditions and the health of the baby.  Keep all follow-up and prenatal care visits. This is important.  What happens during routine prenatal care visits?  Your health care provider will:  Measure your weight and blood pressure.  Check for fetal heart sounds.  Measure the height of your uterus in your abdomen (fundal height). This may be measured starting around week 20 of pregnancy.  Check the position of your baby inside your uterus.  Ask questions about your diet, sleeping patterns, and whether you can feel the baby  move.  Review warning signs to watch for and signs of labor.  Ask about any pregnancy symptoms you are having and how you are dealing with them. Symptoms may include:  Headaches.  Nausea and vomiting.  Vaginal discharge.  Swelling.  Fatigue.  Constipation.  Changes in your vision.  Feeling persistently sad or anxious.  Any discomfort, including back or pelvic pain.  Bleeding or spotting.  Make a list of questions to ask your health care provider at your routine visits.  What tests might I have during prenatal care visits?  You may have blood, urine, and imaging tests throughout your pregnancy, such as:  Urine tests to check for glucose, protein, or signs of infection.  Glucose tests to check for a form of diabetes that can develop during pregnancy (gestational diabetes mellitus). This is usually done around week 24 of pregnancy.  Ultrasounds to check your baby's growth and development, to check for birth defects, and to check your baby's well-being. These can also help to decide when you should deliver your baby.  A test to check for group B strep (GBS) infection. This is usually done around week 36 of pregnancy.  Genetic testing. This may include blood, fluid, or tissue sampling, or imaging tests, such as an ultrasound. Some genetic tests are done during the first trimester and some are done during the second trimester.  What else can I expect during prenatal care visits?  Your health care provider may recommend getting certain vaccines during pregnancy. These may include:  A yearly flu shot (annual influenza vaccine). This is especially important if you will be pregnant during flu season.  Tdap (tetanus, diphtheria, pertussis) vaccine. Getting this vaccine during pregnancy can protect your baby from whooping cough (pertussis) after birth. This vaccine may be recommended between weeks 27 and 36 of pregnancy.  A COVID-19 vaccine.  Later in your pregnancy, your health care provider may give you information  about:  Childbirth and breastfeeding classes.  Choosing a health care provider for your baby.  Umbilical cord banking.  Breastfeeding.  Birth control after your baby is born.  The hospital labor and delivery unit and how to set up a tour.  Registering at the hospital before you go into labor.  Where to find more information  Office on Women's Health: womenshealth.gov  American Pregnancy Association: americanpregnancy.org  March of Dimes: marchofdimes.org  Summary  Prenatal care helps you and your baby stay as healthy as possible during pregnancy.  Your first prenatal care visit will most likely be the longest.  You will have visits and tests throughout your pregnancy to monitor your health and your baby's health.  Bring a list of questions to your visits to ask your health care provider.  Make sure to keep all follow-up and prenatal care visits.  This information is not intended to replace advice given to you by your health care provider. Make sure you discuss any questions you have with your health care provider.  Document Revised: 09/30/2021 Document Reviewed: 09/30/2021  Elsevier Patient Education © 2024 [employer] Inc.

## 2024-05-01 NOTE — PROGRESS NOTES
Initial ob visit     CC- Here for care of pregnancy        Riana Anthony is a 33 y.o. female, , who presents for her first obstetrical visit.  Patient's last menstrual period was 2024 (approximate).. Her NED is 2024, by Last Menstrual Period. Current GA is 8w0d.     Initial positive test date : 2024 at PCP office, HCG        Her periods are every 30 days.  Prior obstetric issues: none  Patient's past medical history is significant for:  None .  Family history of genetic issues (includes FOB): None  Prior infections concerning in pregnancy (Rash, fever in last 2 weeks): No  Varicella Hx - vaccinated  Prior testing for Cystic Fibrosis Carrier or Sickle Cell Trait- None  Prepregnancy BMI - Body mass index is 35.28 kg/m².  History of STD: yes HSV  Hx of HSV for patient or partner: yes - both  Ultrasound Today: yes    OB History    Para Term  AB Living   6 4 3 1 1 3   SAB IAB Ectopic Molar Multiple Live Births   1 0 0 0   4      # Outcome Date GA Lbr Alen/2nd Weight Sex Type Anes PTL Lv   6 Current            5  22 28w0d   F CS-Unspec Gen N ND   4 SAB 2021 10w0d             Birth Comments: no D&C   3 Term 07/27/15 39w0d  3118 g (6 lb 14 oz) M Vag-Spont  Y ALICE      Birth Comments: NATHANIEL from dehydration at 20 weeks; Elective induction      Complications: Status post induction of labor   2 Term 12 39w0d  3062 g (6 lb 12 oz) M Vag-Spont  N ALICE      Birth Comments: Elective induction      Complications: Status post induction of labor   1 Term 11 39w0d  4026 g (8 lb 14 oz) M Vag-Spont  N ALICE      Birth Comments: induced for LGA      Complications: Status post induction of labor      Obstetric Comments   FOB #1 : Pregnancy # 1-5       Additional Pertinent History   Last Pap : 2023 Result: negative HPV: unknown      Last Completed Pap Smear       This patient has no relevant Health Maintenance data.          History of abnormal Pap smear: no  Family  history of uterine, colon, breast, or ovarian cancer: no  Feelings of Anxiety or Depression: no  Tobacco Usage?: No   Alcohol/Drug Use?: NO  Over the age of 35 at delivery: no  Genetic Screening: desires cell free DNA    PM    Current Outpatient Medications:     famotidine (Pepcid) 20 MG tablet, Take 1 tablet by mouth 2 (Two) Times a Day., Disp: 60 tablet, Rfl: 5    promethazine (PHENERGAN) 12.5 MG tablet, Take 1 tablet by mouth Every 6 (Six) Hours As Needed for Nausea or Vomiting., Disp: 30 tablet, Rfl: 3    ondansetron ODT (ZOFRAN-ODT) 4 MG disintegrating tablet, Take 1 tablet by mouth Every 8 (Eight) Hours As Needed for Nausea or Vomiting., Disp: 30 tablet, Rfl: 3     Past Medical History:   Diagnosis Date    Anemia     Back pain     Bipolar disorder     Cholelithiasis     Depression     Fetal sacrococcygeal teratoma affecting antepartum care of mother 2022    GERD (gastroesophageal reflux disease)     on BID PPI    History of transfusion     PATIENT DENIES REACTION    HL (hearing loss)     HSV-2 infection     Irregular periods     Miscarriage     x 1     (normal spontaneous vaginal delivery)     x 3    Obesity     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  Follows with psychiatry, on Prozac, previously on Zyprexa.    Recurrent pregnancy loss, antepartum condition or complication     Umbilical hernia         Past Surgical History:   Procedure Laterality Date    ABDOMINOPLASTY N/A 2022    Procedure: ABDOMINOPLASTY WITH LIPOSUCTION;  Surgeon: Frederick Betts MD;  Location: [facility] OR;  Service: Plastics;  Laterality: N/A;    BARIATRIC SURGERY      BREAST AUGMENTATION Bilateral 2022    Procedure: BREAST AUGMENTATION BILATERAL WITH SILICONE IMPLANTS;  Surgeon: Frederick Betts MD;  Location: [facility] OR;  Service: Plastics;  Laterality: Bilateral;    BREAST SURGERY       SECTION PRIMARY  2022    CHOLECYSTECTOMY N/A 2021    Procedure: CHOLECYSTECTOMY LAPAROSCOPIC;   Surgeon: Amrita Roche MD;  Location:  [facility] OR;  Service: General;  Laterality: N/A;    COSMETIC SURGERY  2 years ago    Tummy tuck    GASTRIC SLEEVE LAPAROSCOPIC  2019    19.  [address].  op note rviewed    HERNIA REPAIR      VENTRAL/INCISIONAL HERNIA REPAIR N/A 2022    Procedure: VENTRAL HERNIA REPAIR OPEN;  Surgeon: Segundo Morales MD;  Location:  [facility] OR;  Service: General;  Laterality: N/A;       Review of Systems   Review of Systems    Patient Reports:  nausea, vomiting and abdominal pain. Patient has been taking phenergan and OTC unisom and vitamin b6 that has not helped at all.  Patient Denies:vaginal bleeding  All systems reviewed and otherwise normal.    I have reviewed and agree with the HPI, ROS, and historical information as entered above. Grey Stroud MD      /76   Wt 96.2 kg (212 lb)   LMP 2024 (Approximate)   BMI 35.28 kg/m²     The additional following portions of the patient's history were reviewed and updated as appropriate: allergies, current medications, past family history, past medical history, past social history, past surgical history, and problem list.    Physical Exam  General:  well developed; well nourished  no acute distress   Chest/Respiratory: No labored breathing, normal respiratory effort, normal appearance, no respiratory noises noted   Heart:  normal rate, regular rhythm,  no murmurs, rubs, or gallops   Thyroid: normal to inspection and palpation   Breasts:  Not performed.   Abdomen: soft, non-tender; no masses  no umbilical or inguinal hernias are present  no hepato-splenomegaly   Pelvis: Not performed.        Assessment and Plan    Problem List Items Addressed This Visit       History of classical  section     Other Visit Diagnoses       8 weeks gestation of pregnancy    -  Primary    Relevant Orders    Obstetric Panel    HIV-1 / O / 2 Ag / Antibody    Urine Culture - Urine, Urine, Clean Catch    Urinalysis With  Microscopic - Urine, Clean Catch    Chlamydia trachomatis, Neisseria gonorrhoeae, PCR - Urine, Urine, Clean Catch    Urine Drug Screen - Urine, Clean Catch    Mild hyperemesis gravidarum        Relevant Orders    Comprehensive Metabolic Panel            Pregnancy at 8w0d  Reviewed routine prenatal care with the office and educational materials given  Lab(s) Ordered  Discussed options for genetic testing including first trimester nuchal translucency screen, genetic disease carrier testing, quadruple screen, and NIPT  Medication(s) Ordered  Nausea/Vomiting - desires medication.  Options discussed and encouraged to be proactive to avoid constipation if on Zofran.  Patient is on Prenatal vitamins  Return in about 4 weeks (around 5/29/2024) for Routine prenatal care.      Grey Stroud MD  05/01/2024

## 2024-05-02 DIAGNOSIS — O21.0 MILD HYPEREMESIS GRAVIDARUM: Primary | ICD-10-CM

## 2024-05-02 LAB
ABO GROUP BLD: NORMAL
ALBUMIN SERPL-MCNC: 4.1 G/DL (ref 3.9–4.9)
ALBUMIN/GLOB SERPL: 1.5 {RATIO} (ref 1.2–2.2)
ALP SERPL-CCNC: 122 IU/L (ref 44–121)
ALT SERPL-CCNC: 19 IU/L (ref 0–32)
AMPHETAMINES UR QL SCN: NEGATIVE NG/ML
APPEARANCE UR: ABNORMAL
AST SERPL-CCNC: 15 IU/L (ref 0–40)
BACTERIA #/AREA URNS HPF: ABNORMAL /[HPF]
BARBITURATES UR QL SCN: NEGATIVE NG/ML
BASOPHILS # BLD AUTO: 0.1 X10E3/UL (ref 0–0.2)
BASOPHILS NFR BLD AUTO: 1 %
BENZODIAZ UR QL SCN: NEGATIVE NG/ML
BILIRUB SERPL-MCNC: 0.4 MG/DL (ref 0–1.2)
BILIRUB UR QL STRIP: NEGATIVE
BLD GP AB SCN SERPL QL: NEGATIVE
BUN SERPL-MCNC: 6 MG/DL (ref 6–20)
BUN/CREAT SERPL: 11 (ref 9–23)
BZE UR QL SCN: NEGATIVE NG/ML
CALCIUM SERPL-MCNC: 9.7 MG/DL (ref 8.7–10.2)
CANNABINOIDS UR QL SCN: NEGATIVE NG/ML
CASTS URNS QL MICRO: ABNORMAL /LPF
CHLORIDE SERPL-SCNC: 101 MMOL/L (ref 96–106)
CO2 SERPL-SCNC: 22 MMOL/L (ref 20–29)
COLOR UR: YELLOW
CREAT SERPL-MCNC: 0.56 MG/DL (ref 0.57–1)
CREAT UR-MCNC: 376.5 MG/DL (ref 20–300)
CRYSTALS URNS MICRO: ABNORMAL
EGFRCR SERPLBLD CKD-EPI 2021: 124 ML/MIN/1.73
EOSINOPHIL # BLD AUTO: 0.1 X10E3/UL (ref 0–0.4)
EOSINOPHIL NFR BLD AUTO: 1 %
EPI CELLS #/AREA URNS HPF: ABNORMAL /HPF (ref 0–10)
ERYTHROCYTE [DISTWIDTH] IN BLOOD BY AUTOMATED COUNT: 12.9 % (ref 11.7–15.4)
GLOBULIN SER CALC-MCNC: 2.7 G/DL (ref 1.5–4.5)
GLUCOSE SERPL-MCNC: 82 MG/DL (ref 70–99)
GLUCOSE UR QL STRIP: NEGATIVE
HBV SURFACE AG SERPL QL IA: NEGATIVE
HCT VFR BLD AUTO: 41 % (ref 34–46.6)
HCV IGG SERPL QL IA: NON REACTIVE
HGB BLD-MCNC: 13.3 G/DL (ref 11.1–15.9)
HGB UR QL STRIP: ABNORMAL
HIV 1+2 AB+HIV1 P24 AG SERPL QL IA: NON REACTIVE
IMM GRANULOCYTES # BLD AUTO: 0 X10E3/UL (ref 0–0.1)
IMM GRANULOCYTES NFR BLD AUTO: 0 %
KETONES UR QL STRIP: ABNORMAL
LABORATORY COMMENT REPORT: ABNORMAL
LEUKOCYTE ESTERASE UR QL STRIP: NEGATIVE
LYMPHOCYTES # BLD AUTO: 3 X10E3/UL (ref 0.7–3.1)
LYMPHOCYTES NFR BLD AUTO: 28 %
MCH RBC QN AUTO: 28.7 PG (ref 26.6–33)
MCHC RBC AUTO-ENTMCNC: 32.4 G/DL (ref 31.5–35.7)
MCV RBC AUTO: 88 FL (ref 79–97)
METHADONE UR QL SCN: NEGATIVE NG/ML
MICRO URNS: ABNORMAL
MONOCYTES # BLD AUTO: 0.6 X10E3/UL (ref 0.1–0.9)
MONOCYTES NFR BLD AUTO: 6 %
MUCOUS THREADS URNS QL MICRO: PRESENT
NEUTROPHILS # BLD AUTO: 6.9 X10E3/UL (ref 1.4–7)
NEUTROPHILS NFR BLD AUTO: 64 %
NITRITE UR QL STRIP: NEGATIVE
OPIATES UR QL SCN: NEGATIVE NG/ML
OXYCODONE+OXYMORPHONE UR QL SCN: NEGATIVE NG/ML
PCP UR QL: NEGATIVE NG/ML
PH UR STRIP: 6.5 [PH] (ref 5–7.5)
PH UR: 6.6 [PH] (ref 4.5–8.9)
PLATELET # BLD AUTO: 394 X10E3/UL (ref 150–450)
POTASSIUM SERPL-SCNC: 4.3 MMOL/L (ref 3.5–5.2)
PROPOXYPH UR QL SCN: NEGATIVE NG/ML
PROT SERPL-MCNC: 6.8 G/DL (ref 6–8.5)
PROT UR QL STRIP: ABNORMAL
RBC # BLD AUTO: 4.64 X10E6/UL (ref 3.77–5.28)
RBC #/AREA URNS HPF: ABNORMAL /HPF (ref 0–2)
RH BLD: POSITIVE
RPR SER QL: NON REACTIVE
RUBV IGG SERPL IA-ACNC: 3.78 INDEX
SODIUM SERPL-SCNC: 137 MMOL/L (ref 134–144)
SP GR UR STRIP: >=1.03 (ref 1–1.03)
UNIDENT CRYS URNS QL MICRO: PRESENT
UROBILINOGEN UR STRIP-MCNC: 1 MG/DL (ref 0.2–1)
WBC # BLD AUTO: 10.7 X10E3/UL (ref 3.4–10.8)
WBC #/AREA URNS HPF: ABNORMAL /HPF (ref 0–5)

## 2024-05-02 RX ORDER — ONDANSETRON 4 MG/1
4 TABLET, FILM COATED ORAL EVERY 8 HOURS PRN
Qty: 30 TABLET | Refills: 1 | Status: SHIPPED | OUTPATIENT
Start: 2024-05-02 | End: 2025-05-02

## 2024-05-03 LAB
BACTERIA UR CULT: NORMAL
BACTERIA UR CULT: NORMAL
C TRACH RRNA SPEC QL NAA+PROBE: NEGATIVE
N GONORRHOEA RRNA SPEC QL NAA+PROBE: NEGATIVE

## 2024-05-06 ENCOUNTER — TELEPHONE (OUTPATIENT)
Dept: OBSTETRICS AND GYNECOLOGY | Facility: CLINIC | Age: 34
End: 2024-05-06
Payer: MEDICAID

## 2024-05-14 ENCOUNTER — LAB (OUTPATIENT)
Dept: OBSTETRICS AND GYNECOLOGY | Facility: CLINIC | Age: 34
End: 2024-05-14
Payer: MEDICAID

## 2024-05-14 DIAGNOSIS — Z34.91 PRENATAL CARE IN FIRST TRIMESTER: Primary | ICD-10-CM

## 2024-05-18 LAB
5P15 DELETION (CRI-DU-CHAT): NOT DETECTED
CFDNA.FET/CFDNA.TOTAL SFR FETUS: NORMAL %
CITATION REF LAB TEST: NORMAL
FET 13+18+21+X+Y ANEUP PLAS.CFDNA: NEGATIVE
FET 1P36 DEL RISK WBC.DNA+CFDNA QL: NOT DETECTED
FET 22Q11.2 DEL RISK WBC.DNA+CFDNA QL: NOT DETECTED
FET CHR 11Q23 DEL PLAS.CFDNA QL: NOT DETECTED
FET CHR 15Q11 DEL PLAS.CFDNA QL: NOT DETECTED
FET CHR 21 TS PLAS.CFDNA QL: NEGATIVE
FET CHR 4P16 DEL PLAS.CFDNA QL: NOT DETECTED
FET CHR 8Q24 DEL PLAS.CFDNA QL: NOT DETECTED
FET MS X RISK WBC.DNA+CFDNA QL: NOT DETECTED
FET SEX PLAS.CFDNA DOSAGE CFDNA: NORMAL
FET TS 13 RISK PLAS.CFDNA QL: NEGATIVE
FET TS 18 RISK WBC.DNA+CFDNA QL: NEGATIVE
FET X + Y ANEUP RISK PLAS.CFDNA SEQ-IMP: NOT DETECTED
GA EST FROM CONCEPTION DATE: NORMAL D
GESTATIONAL AGE > 9:: YES
LAB DIRECTOR NAME PROVIDER: NORMAL
LAB DIRECTOR NAME PROVIDER: NORMAL
LABORATORY COMMENT REPORT: NORMAL
LIMITATIONS OF THE TEST: NORMAL
NEGATIVE PREDICTIVE VALUE: NORMAL
NOTE: NORMAL
PERFORMANCE CHARACTERISTICS: NORMAL
POSITIVE PREDICTIVE VALUE: NORMAL
REF LAB TEST METHOD: NORMAL
TEST PERFORMANCE INFO SPEC: NORMAL
TRIOSOMY 16: NOT DETECTED
TRISOMY 22: NOT DETECTED

## 2024-05-20 DIAGNOSIS — O09.299 HISTORY OF FETAL ANOMALY IN PRIOR PREGNANCY, CURRENTLY PREGNANT: Primary | ICD-10-CM

## 2024-05-21 PROCEDURE — 87081 CULTURE SCREEN ONLY: CPT

## 2024-05-29 ENCOUNTER — ROUTINE PRENATAL (OUTPATIENT)
Dept: OBSTETRICS AND GYNECOLOGY | Facility: CLINIC | Age: 34
End: 2024-05-29
Payer: MEDICAID

## 2024-05-29 VITALS — DIASTOLIC BLOOD PRESSURE: 74 MMHG | WEIGHT: 211.4 LBS | BODY MASS INDEX: 35.18 KG/M2 | SYSTOLIC BLOOD PRESSURE: 116 MMHG

## 2024-05-29 DIAGNOSIS — Z98.891 HISTORY OF CLASSICAL CESAREAN SECTION: ICD-10-CM

## 2024-05-29 DIAGNOSIS — O21.9 NAUSEA AND VOMITING DURING PREGNANCY: ICD-10-CM

## 2024-05-29 DIAGNOSIS — K21.9 GASTROESOPHAGEAL REFLUX DISEASE, UNSPECIFIED WHETHER ESOPHAGITIS PRESENT: ICD-10-CM

## 2024-05-29 DIAGNOSIS — Z34.91 FIRST TRIMESTER PREGNANCY: ICD-10-CM

## 2024-05-29 DIAGNOSIS — O20.0 THREATENED ABORTION: ICD-10-CM

## 2024-05-29 DIAGNOSIS — Z34.91 PRENATAL CARE IN FIRST TRIMESTER: Primary | ICD-10-CM

## 2024-05-29 LAB
GLUCOSE UR STRIP-MCNC: NEGATIVE MG/DL
PROT UR STRIP-MCNC: NEGATIVE MG/DL

## 2024-05-29 RX ORDER — PROMETHAZINE HYDROCHLORIDE 12.5 MG/1
12.5 TABLET ORAL EVERY 6 HOURS PRN
Qty: 30 TABLET | Refills: 3 | Status: SHIPPED | OUTPATIENT
Start: 2024-05-29

## 2024-05-29 RX ORDER — FAMOTIDINE 20 MG/1
20 TABLET, FILM COATED ORAL 2 TIMES DAILY
Qty: 60 TABLET | Refills: 5 | Status: SHIPPED | OUTPATIENT
Start: 2024-05-29

## 2024-05-29 NOTE — PROGRESS NOTES
OB FOLLOW UP  CC- Here for care of pregnancy        Riana Anthony is a 33 y.o.  12w0d patient being seen today for her obstetrical follow up visit. Patient reports occasional headaches that resolve without medication.     Her prenatal care is complicated by (and status) :   Patient Active Problem List   Diagnosis    Gastroesophageal reflux disease    Supraumbilical hernia    Herpetic vulvovaginitis    Status post bariatric surgery    Anemia    Ventral hernia    Psychosis    Depression    History of classical  section       Genetic testing?: already completed and was normal.  NOB labs reviewed  Ultrasound Today: No    ROS -   Patient Denies: leaking of fluid, vaginal bleeding, dysuria, excessive vomiting, and more than 6 contractions per hour  All other systems reviewed and are negative.     The additional following portions of the patient's history were reviewed and updated as appropriate: allergies, current medications, past family history, past medical history, past social history, past surgical history, and problem list.    I have reviewed and agree with the HPI, ROS, and historical information as entered above. Grey Stroud MD          /74   Wt 95.9 kg (211 lb 6.4 oz)   LMP 2024 (Approximate)   BMI 35.18 kg/m²         EXAM:     Prenatal Vitals  BP: 116/74  Weight: 95.9 kg (211 lb 6.4 oz)              Urine Glucose Read-only: Negative  Urine Protein Read-only: Negative       Assessment and Plan    Problem List Items Addressed This Visit       Gastroesophageal reflux disease    Overview     2021 refractory acid reflux - referring to bariatric surgeon for further eval.            Relevant Medications    famotidine (Pepcid) 20 MG tablet    History of classical  section     Other Visit Diagnoses       Prenatal care in first trimester    -  Primary    Relevant Orders    POC Urinalysis Dipstick (Completed)    Nausea and vomiting during pregnancy         Relevant Medications    promethazine (PHENERGAN) 12.5 MG tablet    First trimester pregnancy        Relevant Medications    famotidine (Pepcid) 20 MG tablet    Threatened         Relevant Orders    US Ob 14 + Weeks Single or First Gestation            Pregnancy at 12w0d  Labs reviewed from New OB Visit.  Counseled on genetic testing, carrier status and option for NT screen  Activity and Exercise discussed.  U/S ordered at follow up  Nausea/Vomiting - desires medication.  Options discussed of Diclegis/Bonjesta, Promethazine, and Zofran  Patient is on Prenatal vitamins  Return in about 4 weeks (around 2024) for Routine prenatal care and US.    Grey Stroud MD  2024

## 2024-06-04 ENCOUNTER — OFFICE VISIT (OUTPATIENT)
Dept: GASTROENTEROLOGY | Facility: CLINIC | Age: 34
End: 2024-06-04
Payer: MEDICAID

## 2024-06-04 VITALS
TEMPERATURE: 97.3 F | HEIGHT: 66 IN | OXYGEN SATURATION: 98 % | SYSTOLIC BLOOD PRESSURE: 108 MMHG | BODY MASS INDEX: 33.91 KG/M2 | WEIGHT: 211 LBS | DIASTOLIC BLOOD PRESSURE: 70 MMHG | HEART RATE: 95 BPM

## 2024-06-04 DIAGNOSIS — R10.13 EPIGASTRIC PAIN: ICD-10-CM

## 2024-06-04 DIAGNOSIS — K21.9 GASTROESOPHAGEAL REFLUX DISEASE, UNSPECIFIED WHETHER ESOPHAGITIS PRESENT: Primary | ICD-10-CM

## 2024-06-04 PROCEDURE — 99213 OFFICE O/P EST LOW 20 MIN: CPT | Performed by: NURSE PRACTITIONER

## 2024-06-04 PROCEDURE — 1159F MED LIST DOCD IN RCRD: CPT | Performed by: NURSE PRACTITIONER

## 2024-06-04 PROCEDURE — 1160F RVW MEDS BY RX/DR IN RCRD: CPT | Performed by: NURSE PRACTITIONER

## 2024-06-04 NOTE — PROGRESS NOTES
"     New Patient Consultation     Patient Name: Riana Anthony  : 1990   MRN: [id number]     Chief Complaint:    Chief Complaint   Patient presents with    Heartburn     Currently 13 weeks pregnant.   Patient has had heartburn for 9+ years.       History of Present Illness: Riana Anthony is a 33 y.o. female who is here today with medical history of GERD, hernia, anemia, HSV-2 for a Gastroenterology Consultation for GERD and abdominal pain    Riana reported to [facility] on  with umbilical abdominal pain and loose stools.  She had a noncontrasted CT scan which showed some midline abdominal wall scarring from previous surgery and postsurgical changes.  She was given Carafate with symptom improvement.    She is currently 12 weeks pregnant.  She reports \"terrible\" reflux for over 12 years. Her symptoms improve when she is pregnant and then return.  Her symptoms are epigastric pain and periumbilical pain. The pain is unrelated to eating- it is just all day long no matter what or when she eats.  It is not really related to bms.  She is having constipation right now in pregnancy.  She will take up to 6 omeprazole at times to ease the pain.  There is a lot of belching and burping.          Abdominal surgical history of abdominoplasty, gastric sleeve, , cholecystectomy, open ventral hernia repair  Subjective      Review of Systems:   Review of Systems   Constitutional:  Negative for appetite change and unexpected weight loss.   HENT:  Negative for trouble swallowing.    Gastrointestinal:  Positive for abdominal distention, abdominal pain and constipation. Negative for anal bleeding, blood in stool, diarrhea, nausea, rectal pain, vomiting, GERD and indigestion.       Past Medical History:   Past Medical History:   Diagnosis Date    Anemia     Back pain     Bipolar disorder     Cholelithiasis     Depression     Fetal sacrococcygeal teratoma affecting antepartum care of mother 2022    " GERD (gastroesophageal reflux disease)     on BID PPI    History of transfusion     PATIENT DENIES REACTION    HL (hearing loss)     HSV-2 infection     Irregular periods     Miscarriage     x 1     (normal spontaneous vaginal delivery)     x 3    Obesity     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  Follows with psychiatry, on Prozac, previously on Zyprexa.    Recurrent pregnancy loss, antepartum condition or complication     Umbilical hernia        Past Surgical History:   Past Surgical History:   Procedure Laterality Date    ABDOMINOPLASTY N/A 2022    Procedure: ABDOMINOPLASTY WITH LIPOSUCTION;  Surgeon: Frederick Betts MD;  Location:  [facility];  Service: Plastics;  Laterality: N/A;    BARIATRIC SURGERY      BREAST AUGMENTATION Bilateral 2022    Procedure: BREAST AUGMENTATION BILATERAL WITH SILICONE IMPLANTS;  Surgeon: Frederick Betts MD;  Location:  [facility];  Service: Plastics;  Laterality: Bilateral;    BREAST SURGERY       SECTION PRIMARY  2022    CHOLECYSTECTOMY N/A 2021    Procedure: CHOLECYSTECTOMY LAPAROSCOPIC;  Surgeon: Amrita Roche MD;  Location:  [facility];  Service: General;  Laterality: N/A;    COSMETIC SURGERY  2 years ago    Tummy tuck    GASTRIC SLEEVE LAPAROSCOPIC  20.  [address] [place] [address].  op note rviewed    HERNIA REPAIR      VENTRAL/INCISIONAL HERNIA REPAIR N/A 2022    Procedure: VENTRAL HERNIA REPAIR OPEN;  Surgeon: Segundo Morales MD;  Location:  [facility];  Service: General;  Laterality: N/A;       Family History:   Family History   Problem Relation Age of Onset    No Known Problems Mother     No Known Problems Father     No Known Problems Sister     No Known Problems Brother     No Known Problems Maternal Grandmother     No Known Problems Maternal Grandfather     No Known Problems Paternal Grandmother     No Known Problems Paternal Grandfather        Social History:   Social History     Socioeconomic History     "Marital status:    Tobacco Use    Smoking status: Never    Smokeless tobacco: Never   Vaping Use    Vaping status: Former    Substances: Nicotine    Devices: Disposable (pt no longer vapes 7/25/21)    Passive vaping exposure: Yes   Substance and Sexual Activity    Alcohol use: Never    Drug use: Never    Sexual activity: Yes     Partners: Male     Birth control/protection: None       Alcohol/Tobacco History:   Social History     Substance and Sexual Activity   Alcohol Use Never     Social History     Tobacco Use   Smoking Status Never   Smokeless Tobacco Never       Medications:     Current Outpatient Medications:     famotidine (Pepcid) 20 MG tablet, Take 1 tablet by mouth 2 (Two) Times a Day., Disp: 60 tablet, Rfl: 5    promethazine (PHENERGAN) 12.5 MG tablet, Take 1 tablet by mouth Every 6 (Six) Hours As Needed for Nausea or Vomiting., Disp: 30 tablet, Rfl: 3    Allergies:   No Known Allergies    Objective     Physical Exam:  Vital Signs:   Vitals:    06/04/24 0823   BP: 108/70   BP Location: Left arm   Patient Position: Sitting   Cuff Size: Adult   Pulse: 95   Temp: 97.3 °F (36.3 °C)   TempSrc: Temporal   SpO2: 98%   Weight: 95.7 kg (211 lb)   Height: 167.6 cm (66\")   PainSc: 0-No pain     Body mass index is 34.06 kg/m².     Physical Exam  Constitutional:       General: She is not in acute distress.     Appearance: She is well-developed.   Pulmonary:      Effort: Pulmonary effort is normal. No accessory muscle usage or respiratory distress.   Abdominal:      General: Bowel sounds are normal. There is distension.      Palpations: Abdomen is soft.   Skin:     Coloration: Skin is not pale.      Findings: No erythema.   Neurological:      Mental Status: She is alert and oriented to person, place, and time.   Psychiatric:         Speech: Speech normal.         Behavior: Behavior normal.         Thought Content: Thought content normal.         Judgment: Judgment normal.         Assessment / Plan  "     Assessment/Plan:   Diagnoses and all orders for this visit:    1. Gastroesophageal reflux disease, unspecified whether esophagitis present (Primary)    2. Epigastric pain    Symptoms  currently controlled at this time as they naturally improve while she is pregnant.    She will return when she is postpartum we can consider repeating her EGD as well as trying various different PPIs to see if she has better response.  We also discussed that she has scar tissue at the area of her pain-this could explain some of her symptoms.  Lastly she may need her gastric sleeve revised-again we will discuss this after she is postpartum and will be able to undergo further testing    Follow Up: in Late Nov/early Dec  No follow-ups on file.    Plan of care reviewed with the patient at the conclusion of today's visit.  Education was provided regarding diagnosis, management, and any prescribed or recommended OTC medications.  Patient verbalized understanding of and agreement with management plan.         CHRIS Lopez  [facility]

## 2024-06-25 DIAGNOSIS — O09.299 HISTORY OF FETAL ANOMALY IN PRIOR PREGNANCY, CURRENTLY PREGNANT: Primary | ICD-10-CM

## 2024-06-26 ENCOUNTER — ROUTINE PRENATAL (OUTPATIENT)
Dept: OBSTETRICS AND GYNECOLOGY | Facility: CLINIC | Age: 34
End: 2024-06-26
Payer: MEDICAID

## 2024-06-26 VITALS — BODY MASS INDEX: 33.28 KG/M2 | DIASTOLIC BLOOD PRESSURE: 64 MMHG | SYSTOLIC BLOOD PRESSURE: 102 MMHG | WEIGHT: 206.2 LBS

## 2024-06-26 DIAGNOSIS — Z3A.16 16 WEEKS GESTATION OF PREGNANCY: Primary | ICD-10-CM

## 2024-06-26 DIAGNOSIS — Z98.891 HISTORY OF CLASSICAL CESAREAN SECTION: ICD-10-CM

## 2024-06-26 LAB
GLUCOSE UR STRIP-MCNC: NEGATIVE MG/DL
PROT UR STRIP-MCNC: NEGATIVE MG/DL

## 2024-06-26 PROCEDURE — 99213 OFFICE O/P EST LOW 20 MIN: CPT | Performed by: OBSTETRICS & GYNECOLOGY

## 2024-06-26 NOTE — PROGRESS NOTES
OB FOLLOW UP  CC- Here for care of pregnancy        Riana Anthony is a 34 y.o.  16w0d patient being seen today for her obstetrical follow up visit. Patient reports nausea and vomiting that is mild-moderate and takes phenergan that does help some.    Her prenatal care is complicated by (and status) : see below.  Patient Active Problem List   Diagnosis    Gastroesophageal reflux disease    Supraumbilical hernia    Herpetic vulvovaginitis    Status post bariatric surgery    Anemia    Ventral hernia    Psychosis    Depression    History of classical  section       Ultrasound Today: Yes    AFP: declines    ROS -   Patient Denies: leaking of fluid, vaginal bleeding, dysuria, excessive vomiting, and more than 6 contractions per hour  All other systems reviewed and are negative.       The additional following portions of the patient's history were reviewed and updated as appropriate: allergies, current medications, past family history, past medical history, past social history, past surgical history, and problem list.      I have reviewed and agree with the HPI, ROS, and historical information as entered above. Grey Stroud MD          EXAM:     Prenatal Vitals  BP: 102/64  Weight: 93.5 kg (206 lb 3.2 oz)   Fetal Heart Rate: 138/US         Urine Glucose Read-only: Negative  Urine Protein Read-only: Negative           Assessment and Plan    Problem List Items Addressed This Visit       History of classical  section     Other Visit Diagnoses       16 weeks gestation of pregnancy    -  Primary    Relevant Orders    POC Urinalysis Dipstick (Completed)    Alpha Fetoprotein, Maternal            Pregnancy at 16w0d  Fetal status reassuring.   Counseled on MSAFP alone in relation to OTD and placental issues.    Anatomy scan next visit.   Activity and Exercise discussed.  U/S ordered at follow up  Patient is on Prenatal vitamins  Return in about 4 weeks (around 2024) for Routine prenatal  care after PDC scan in 1 month.    Grey Stroud MD  06/26/2024

## 2024-06-28 LAB
AFP INTERP SERPL-IMP: NORMAL
AFP INTERP SERPL-IMP: NORMAL
AFP MOM SERPL: 1
AFP SERPL-MCNC: 27.6 NG/ML
AGE AT DELIVERY: 34.2 YR
GA METHOD: NORMAL
GA: 16 WEEKS
IDDM PATIENT QL: NO
LABORATORY COMMENT REPORT: NORMAL
MULTIPLE PREGNANCY: NO
NEURAL TUBE DEFECT RISK FETUS: NORMAL %
RESULT: NORMAL

## 2024-06-28 RX ORDER — SERTRALINE HYDROCHLORIDE 25 MG/1
25 TABLET, FILM COATED ORAL DAILY
Qty: 30 TABLET | Refills: 5 | Status: SHIPPED | OUTPATIENT
Start: 2024-06-28 | End: 2024-07-28

## 2024-07-02 RX ORDER — PROMETHAZINE HYDROCHLORIDE 12.5 MG/1
12.5 TABLET ORAL EVERY 6 HOURS PRN
Qty: 90 TABLET | Refills: 3 | Status: SHIPPED | OUTPATIENT
Start: 2024-07-02

## 2024-07-24 ENCOUNTER — HOSPITAL ENCOUNTER (EMERGENCY)
Facility: HOSPITAL | Age: 34
Discharge: HOME | End: 2024-07-24
Attending: EMERGENCY MEDICINE
Payer: MEDICAID

## 2024-07-24 ENCOUNTER — OFFICE VISIT (OUTPATIENT)
Dept: OBSTETRICS AND GYNECOLOGY | Facility: HOSPITAL | Age: 34
End: 2024-07-24
Payer: MEDICAID

## 2024-07-24 ENCOUNTER — HOSPITAL ENCOUNTER (OUTPATIENT)
Dept: WOMENS IMAGING | Facility: HOSPITAL | Age: 34
Discharge: HOME | End: 2024-07-24
Admitting: OBSTETRICS & GYNECOLOGY
Payer: MEDICAID

## 2024-07-24 ENCOUNTER — ROUTINE PRENATAL (OUTPATIENT)
Dept: OBSTETRICS AND GYNECOLOGY | Facility: CLINIC | Age: 34
End: 2024-07-24
Payer: MEDICAID

## 2024-07-24 VITALS
OXYGEN SATURATION: 98 % | SYSTOLIC BLOOD PRESSURE: 91 MMHG | RESPIRATION RATE: 18 BRPM | BODY MASS INDEX: 32.14 KG/M2 | TEMPERATURE: 97.6 F | DIASTOLIC BLOOD PRESSURE: 57 MMHG | HEART RATE: 80 BPM | WEIGHT: 200 LBS | HEIGHT: 66 IN

## 2024-07-24 VITALS — SYSTOLIC BLOOD PRESSURE: 94 MMHG | DIASTOLIC BLOOD PRESSURE: 68 MMHG | WEIGHT: 201 LBS | BODY MASS INDEX: 32.44 KG/M2

## 2024-07-24 VITALS — SYSTOLIC BLOOD PRESSURE: 93 MMHG | DIASTOLIC BLOOD PRESSURE: 62 MMHG | WEIGHT: 201 LBS | BODY MASS INDEX: 32.44 KG/M2

## 2024-07-24 DIAGNOSIS — Z98.84 STATUS POST BARIATRIC SURGERY: ICD-10-CM

## 2024-07-24 DIAGNOSIS — Z82.79 FAMILY HISTORY OF CONGENITAL ANOMALY: Primary | ICD-10-CM

## 2024-07-24 DIAGNOSIS — O21.9 NAUSEA/VOMITING IN PREGNANCY: ICD-10-CM

## 2024-07-24 DIAGNOSIS — Z98.891 HISTORY OF CLASSICAL CESAREAN SECTION: ICD-10-CM

## 2024-07-24 DIAGNOSIS — K21.9 GASTROESOPHAGEAL REFLUX DISEASE, UNSPECIFIED WHETHER ESOPHAGITIS PRESENT: ICD-10-CM

## 2024-07-24 DIAGNOSIS — Z34.90 PRENATAL CARE, ANTEPARTUM: Primary | ICD-10-CM

## 2024-07-24 DIAGNOSIS — O09.299 HISTORY OF FETAL ANOMALY IN PRIOR PREGNANCY, CURRENTLY PREGNANT: ICD-10-CM

## 2024-07-24 DIAGNOSIS — O21.0 HYPEREMESIS GRAVIDARUM: Primary | ICD-10-CM

## 2024-07-24 DIAGNOSIS — O21.0 MILD HYPEREMESIS GRAVIDARUM: ICD-10-CM

## 2024-07-24 DIAGNOSIS — E86.0 ACUTE DEHYDRATION: ICD-10-CM

## 2024-07-24 LAB
ALBUMIN SERPL-MCNC: 3.8 G/DL (ref 3.5–5.2)
ALBUMIN/GLOB SERPL: 1.5 G/DL
ALP SERPL-CCNC: 80 U/L (ref 39–117)
ALT SERPL W P-5'-P-CCNC: 9 U/L (ref 1–33)
ANION GAP SERPL CALCULATED.3IONS-SCNC: 12 MMOL/L (ref 5–15)
AST SERPL-CCNC: 35 U/L (ref 1–32)
B PARAPERT DNA SPEC QL NAA+PROBE: NOT DETECTED
B PERT DNA SPEC QL NAA+PROBE: NOT DETECTED
BACTERIA UR QL AUTO: ABNORMAL /HPF
BASOPHILS # BLD AUTO: 0.03 10*3/MM3 (ref 0–0.2)
BASOPHILS NFR BLD AUTO: 0.3 % (ref 0–1.5)
BILIRUB SERPL-MCNC: 0.3 MG/DL (ref 0–1.2)
BILIRUB UR QL STRIP: NEGATIVE
BUN SERPL-MCNC: 4 MG/DL (ref 6–20)
BUN/CREAT SERPL: 6.9 (ref 7–25)
C PNEUM DNA NPH QL NAA+NON-PROBE: NOT DETECTED
CALCIUM SPEC-SCNC: 8.8 MG/DL (ref 8.6–10.5)
CHLORIDE SERPL-SCNC: 104 MMOL/L (ref 98–107)
CLARITY UR: CLEAR
CO2 SERPL-SCNC: 20 MMOL/L (ref 22–29)
COLOR UR: YELLOW
CREAT SERPL-MCNC: 0.58 MG/DL (ref 0.57–1)
DEPRECATED RDW RBC AUTO: 42.4 FL (ref 37–54)
EGFRCR SERPLBLD CKD-EPI 2021: 122.7 ML/MIN/1.73
EOSINOPHIL # BLD AUTO: 0.14 10*3/MM3 (ref 0–0.4)
EOSINOPHIL NFR BLD AUTO: 1.5 % (ref 0.3–6.2)
ERYTHROCYTE [DISTWIDTH] IN BLOOD BY AUTOMATED COUNT: 13.2 % (ref 12.3–15.4)
FLUAV SUBTYP SPEC NAA+PROBE: NOT DETECTED
FLUBV RNA ISLT QL NAA+PROBE: NOT DETECTED
GLOBULIN UR ELPH-MCNC: 2.6 GM/DL
GLUCOSE SERPL-MCNC: 87 MG/DL (ref 65–99)
GLUCOSE UR STRIP-MCNC: NEGATIVE MG/DL
GLUCOSE UR STRIP-MCNC: NEGATIVE MG/DL
HADV DNA SPEC NAA+PROBE: NOT DETECTED
HCOV 229E RNA SPEC QL NAA+PROBE: NOT DETECTED
HCOV HKU1 RNA SPEC QL NAA+PROBE: NOT DETECTED
HCOV NL63 RNA SPEC QL NAA+PROBE: NOT DETECTED
HCOV OC43 RNA SPEC QL NAA+PROBE: NOT DETECTED
HCT VFR BLD AUTO: 32.4 % (ref 34–46.6)
HGB BLD-MCNC: 10.8 G/DL (ref 12–15.9)
HGB UR QL STRIP.AUTO: ABNORMAL
HMPV RNA NPH QL NAA+NON-PROBE: NOT DETECTED
HPIV1 RNA ISLT QL NAA+PROBE: NOT DETECTED
HPIV2 RNA SPEC QL NAA+PROBE: NOT DETECTED
HPIV3 RNA NPH QL NAA+PROBE: NOT DETECTED
HPIV4 P GENE NPH QL NAA+PROBE: NOT DETECTED
HYALINE CASTS UR QL AUTO: ABNORMAL /LPF
IMM GRANULOCYTES # BLD AUTO: 0.05 10*3/MM3 (ref 0–0.05)
IMM GRANULOCYTES NFR BLD AUTO: 0.5 % (ref 0–0.5)
KETONES UR QL STRIP: ABNORMAL
LEUKOCYTE ESTERASE UR QL STRIP.AUTO: NEGATIVE
LIPASE SERPL-CCNC: 13 U/L (ref 13–60)
LYMPHOCYTES # BLD AUTO: 2.3 10*3/MM3 (ref 0.7–3.1)
LYMPHOCYTES NFR BLD AUTO: 24.9 % (ref 19.6–45.3)
M PNEUMO IGG SER IA-ACNC: NOT DETECTED
MCH RBC QN AUTO: 29.3 PG (ref 26.6–33)
MCHC RBC AUTO-ENTMCNC: 33.3 G/DL (ref 31.5–35.7)
MCV RBC AUTO: 87.8 FL (ref 79–97)
MONOCYTES # BLD AUTO: 0.47 10*3/MM3 (ref 0.1–0.9)
MONOCYTES NFR BLD AUTO: 5.1 % (ref 5–12)
NEUTROPHILS NFR BLD AUTO: 6.23 10*3/MM3 (ref 1.7–7)
NEUTROPHILS NFR BLD AUTO: 67.7 % (ref 42.7–76)
NITRITE UR QL STRIP: NEGATIVE
NRBC BLD AUTO-RTO: 0 /100 WBC (ref 0–0.2)
PH UR STRIP.AUTO: 6 [PH] (ref 5–8)
PLATELET # BLD AUTO: 310 10*3/MM3 (ref 140–450)
PMV BLD AUTO: 10 FL (ref 6–12)
POTASSIUM SERPL-SCNC: 3.5 MMOL/L (ref 3.5–5.2)
PROT SERPL-MCNC: 6.4 G/DL (ref 6–8.5)
PROT UR QL STRIP: ABNORMAL
PROT UR STRIP-MCNC: ABNORMAL MG/DL
RBC # BLD AUTO: 3.69 10*6/MM3 (ref 3.77–5.28)
RBC # UR STRIP: ABNORMAL /HPF
REF LAB TEST METHOD: ABNORMAL
RHINOVIRUS RNA SPEC NAA+PROBE: NOT DETECTED
RSV RNA NPH QL NAA+NON-PROBE: NOT DETECTED
SARS-COV-2 RNA NPH QL NAA+NON-PROBE: NOT DETECTED
SODIUM SERPL-SCNC: 136 MMOL/L (ref 136–145)
SP GR UR STRIP: 1.02 (ref 1–1.03)
SQUAMOUS #/AREA URNS HPF: ABNORMAL /HPF
UROBILINOGEN UR QL STRIP: ABNORMAL
WBC # UR STRIP: ABNORMAL /HPF
WBC NRBC COR # BLD AUTO: 9.22 10*3/MM3 (ref 3.4–10.8)

## 2024-07-24 PROCEDURE — 99283 EMERGENCY DEPT VISIT LOW MDM: CPT

## 2024-07-24 PROCEDURE — 96374 THER/PROPH/DIAG INJ IV PUSH: CPT

## 2024-07-24 PROCEDURE — 25810000003 SODIUM CHLORIDE 0.9 % SOLUTION: Performed by: EMERGENCY MEDICINE

## 2024-07-24 PROCEDURE — 0202U NFCT DS 22 TRGT SARS-COV-2: CPT | Performed by: EMERGENCY MEDICINE

## 2024-07-24 PROCEDURE — 25010000002 THIAMINE HCL 200 MG/2ML SOLUTION: Performed by: EMERGENCY MEDICINE

## 2024-07-24 PROCEDURE — 80053 COMPREHEN METABOLIC PANEL: CPT | Performed by: EMERGENCY MEDICINE

## 2024-07-24 PROCEDURE — 83690 ASSAY OF LIPASE: CPT | Performed by: EMERGENCY MEDICINE

## 2024-07-24 PROCEDURE — 85025 COMPLETE CBC W/AUTO DIFF WBC: CPT | Performed by: EMERGENCY MEDICINE

## 2024-07-24 PROCEDURE — 76811 OB US DETAILED SNGL FETUS: CPT

## 2024-07-24 PROCEDURE — 81001 URINALYSIS AUTO W/SCOPE: CPT | Performed by: EMERGENCY MEDICINE

## 2024-07-24 RX ORDER — PROMETHAZINE HYDROCHLORIDE 25 MG/1
25 SUPPOSITORY RECTAL EVERY 6 HOURS PRN
Qty: 12 SUPPOSITORY | Refills: 1 | Status: SHIPPED | OUTPATIENT
Start: 2024-07-24 | End: 2025-07-24

## 2024-07-24 RX ORDER — PANTOPRAZOLE SODIUM 40 MG/1
40 TABLET, DELAYED RELEASE ORAL DAILY
Qty: 30 TABLET | Refills: 3 | Status: SHIPPED | OUTPATIENT
Start: 2024-07-24 | End: 2024-08-23

## 2024-07-24 RX ORDER — LAMOTRIGINE 100 MG/1
100 TABLET ORAL DAILY
COMMUNITY
Start: 2024-07-03

## 2024-07-24 RX ORDER — THIAMINE HYDROCHLORIDE 100 MG/ML
100 INJECTION, SOLUTION INTRAMUSCULAR; INTRAVENOUS ONCE
Status: COMPLETED | OUTPATIENT
Start: 2024-07-24 | End: 2024-07-24

## 2024-07-24 RX ORDER — ONDANSETRON HYDROCHLORIDE 8 MG/1
8 TABLET, FILM COATED ORAL EVERY 8 HOURS PRN
Qty: 30 TABLET | Refills: 3 | Status: SHIPPED | OUTPATIENT
Start: 2024-07-24

## 2024-07-24 RX ORDER — SODIUM CHLORIDE 0.9 % (FLUSH) 0.9 %
10 SYRINGE (ML) INJECTION AS NEEDED
Status: DISCONTINUED | OUTPATIENT
Start: 2024-07-24 | End: 2024-07-24 | Stop reason: HOSPADM

## 2024-07-24 RX ADMIN — SODIUM CHLORIDE 1000 ML: 9 INJECTION, SOLUTION INTRAVENOUS at 18:58

## 2024-07-24 RX ADMIN — SODIUM CHLORIDE 1000 ML: 9 INJECTION, SOLUTION INTRAVENOUS at 15:57

## 2024-07-24 RX ADMIN — THIAMINE HYDROCHLORIDE 100 MG: 100 INJECTION, SOLUTION INTRAMUSCULAR; INTRAVENOUS at 18:57

## 2024-07-24 NOTE — LETTER
"2024     Grey Stroud MD  [address] [facility] [address]  [facility] [address]    Patient: Riana Anthony   YOB: 1990   Date of Visit: 2024     Dear Grey Stroud MD:       Thank you for referring Riana Anthony to me for evaluation. Below are the relevant portions of my assessment and plan of care.    If you have questions, please do not hesitate to call me. I look forward to following Riana along with you.         Sincerely,        Douglas A. Milligan, MD        CC: No Recipients    Milligan, Douglas A, MD  24 1119  Sign when Signing Visit  Documentation of the ultrasound findings, images, and interpretations will be available in the patient's Viewpoint report which is located in the imaging tab in chart review.    Maternal/Fetal Medicine Follow Up  Note     Name: Riana Anthony    : 1990     MRN: [id number]     Referring Provider: Grey Stroud MD    Chief Complaint  prev NND    Subjective     History of Present Illness:  Riana Anthony is a 34 y.o.  20w0d who presents today for N&V    NED: Estimated Date of Delivery: 24     ROS:   As noted in HPI.     Objective     Vital Signs  BP 93/62   Wt 91.2 kg (201 lb)   LMP 2024 (Approximate)   Estimated body mass index is 32.44 kg/m² as calculated from the following:    Height as of 24: 167.6 cm (66\").    Weight as of this encounter: 91.2 kg (201 lb).    Physical Exam    Ultrasound Impression:   See Viewpoint    Assessment and Plan     Riana Anthony is a 34 y.o.  20w0d who presents today for N&V    Diagnoses and all orders for this visit:    1. Family history of congenital anomaly (Primary)  Assessment & Plan:  Patient's last pregnancy was complicated by fetal sacrococcygeal teratoma.  The infant  in .  After delivery at 28 weeks gestation due to fetal compromise.  Fortunately recurrence risk for sacrococcygeal teratoma is very small.  Ultrasound today " does not demonstrate sacrococcygeal teratoma.  We will rescan the patient again at 28 weeks gestation and once again reassess fetal anatomy.    Orders:  -     [facility]; Future    2. Status post bariatric surgery  -     [facility]; Future    3. History of classical  section  -      [facility]; Future    4. Nausea/vomiting in pregnancy  Assessment & Plan:  Patient reports continuing nausea and vomiting this pregnancy.   notes that she is only able to keep down Gatorade.  All solid food eaten is vomited nearly immediately.  Patient reports 25 pound weight loss this pregnancy.  We can documented 10 pound weight loss in the last month a month and a half.    Ultrasound ultrasound today demonstrates a normally grown fetus with no abnormality seen.  There were no fetal markers for trisomy.  Amniotic fluid volume and cervical length were normal.    The patient with persistent nausea and vomiting would be reasonable to consider admission for this patient.  I would place her at n.p.o. and hydrate her.  I would institute intravenous antiemetics and then attempt to reintroduce food.  Electrolytes should be checked as they may become abnormal with persistent nausea and vomiting.    Orders:  -     [facility]; Future         Follow Up  Return in about 8 weeks (around 2024).    I spent 20 minutes caring for the patient on the day of service. This included: obtaining or reviewing a separately obtained medical history, reviewing patient records, performing a medically appropriate exam and/or evaluation, counseling or educating the patient/family/caregiver, ordering medications, labs, and/or procedures and documenting such in the medical record. This does not include time spent on review and interpretation of other tests such as fetal ultrasound or the performance of other procedures such as amniocentesis or  CVS.      Douglas A. Milligan, MD  Maternal Fetal Medicine, [facility]     2024

## 2024-07-24 NOTE — ED PROVIDER NOTES
Subjective   History of Present Illness  34-year-old female who reports that she is currently 20 weeks pregnant, followed by Dr. Pittman, who presents with a complaint of persistent nausea and vomiting.  The patient states that she has had the symptoms throughout the entire pregnancy.  She actually saw Dr. Pittman earlier today and reportedly lab work was performed but no IV fluids were ordered.  She states that she feels poorly, continues to not tolerate oral intake well and therefore she presented to the ER.  She also states that she has been seen by high risk OB and per per her report they recommended she receive intermittent infusions of IV fluids.  However Dr. Pittman has not initiated this per her report.  She states that she has lost 25 pounds during this pregnancy.  She continues to feel movement of the pregnancy.  She denies vaginal bleeding or vaginal discharge.  No fever, bodies, chills.  No chest pain, cough, shortness of breath.  No abdominal pain.  No dysuria.  No diarrhea or blood in the stool.  She states that she often is able to drink fluids but she almost never has been able to eat food and keep it down as she will feel nauseated.  However,she has had a good appetite.      Review of Systems   Constitutional:  Positive for activity change and fatigue. Negative for chills and fever.   HENT:  Negative for congestion, ear pain, postnasal drip, sinus pressure and sore throat.    Eyes:  Negative for pain, redness and visual disturbance.   Respiratory:  Negative for cough, chest tightness and shortness of breath.    Cardiovascular:  Negative for chest pain, palpitations and leg swelling.   Gastrointestinal:  Positive for nausea and vomiting. Negative for abdominal pain, anal bleeding, blood in stool and diarrhea.   Endocrine: Negative for polydipsia and polyuria.   Genitourinary:  Negative for difficulty urinating, dysuria, frequency and urgency.   Musculoskeletal:  Negative for arthralgias, back pain  and neck pain.   Skin:  Negative for pallor and rash.   Allergic/Immunologic: Negative for environmental allergies and immunocompromised state.   Neurological:  Negative for dizziness, weakness and headaches.   Hematological:  Negative for adenopathy.   Psychiatric/Behavioral:  Negative for confusion, self-injury and suicidal ideas. The patient is not nervous/anxious.    All other systems reviewed and are negative.      Past Medical History:   Diagnosis Date    Anemia     Back pain     Bipolar disorder     Cholelithiasis     Depression     Fetal sacrococcygeal teratoma affecting antepartum care of mother 2022    GERD (gastroesophageal reflux disease)     on BID PPI    History of transfusion     PATIENT DENIES REACTION    HL (hearing loss)     both ears, wears hearing aides    HSV-2 infection     last outbreak around     Hyperemesis gravidarum     pt states she always throws up every time she eats. She states she only drinks Gatorade.    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  Follows with psychiatry, on Prozac, previously on Zyprexa.    Recurrent pregnancy loss, antepartum condition or complication        No Known Allergies    Past Surgical History:   Procedure Laterality Date    ABDOMINOPLASTY N/A 2022    Procedure: ABDOMINOPLASTY WITH LIPOSUCTION;  Surgeon: Frederick Betts MD;  Location: [facility] OR;  Service: Plastics;  Laterality: N/A;    BREAST AUGMENTATION Bilateral 2022    Procedure: BREAST AUGMENTATION BILATERAL WITH SILICONE IMPLANTS;  Surgeon: Frederick Betts MD;  Location:  [facility];  Service: Plastics;  Laterality: Bilateral;     SECTION PRIMARY  2022    CHOLECYSTECTOMY N/A 2021    Procedure: CHOLECYSTECTOMY LAPAROSCOPIC;  Surgeon: Amrita Roche MD;  Location:  [facility];  Service: General;  Laterality: N/A;    GASTRIC SLEEVE  LAPAROSCOPIC  2019 7/14/19.  [address] [place] [address].  op note rviewed    HERNIA REPAIR      VENTRAL/INCISIONAL HERNIA REPAIR N/A 09/13/2022    Procedure: VENTRAL HERNIA REPAIR OPEN;  Surgeon: Segundo Morales MD;  Location: [facility];  Service: General;  Laterality: N/A;       Family History   Problem Relation Age of Onset    No Known Problems Mother     No Known Problems Father     No Known Problems Sister     No Known Problems Brother     No Known Problems Maternal Grandmother     No Known Problems Maternal Grandfather     No Known Problems Paternal Grandmother     No Known Problems Paternal Grandfather        Social History     Socioeconomic History    Marital status:    Tobacco Use    Smoking status: Never    Smokeless tobacco: Never   Vaping Use    Vaping status: Former    Substances: Nicotine    Devices: Disposable (pt no longer vapes 7/25/21)    Passive vaping exposure: Yes   Substance and Sexual Activity    Alcohol use: Never    Drug use: Never    Sexual activity: Yes     Partners: Male     Birth control/protection: None           Objective   Physical Exam  Vitals and nursing note reviewed.   Constitutional:       General: She is not in acute distress.     Appearance: Normal appearance. She is well-developed. She is not toxic-appearing or diaphoretic.   HENT:      Head: Normocephalic and atraumatic.      Right Ear: External ear normal.      Left Ear: External ear normal.      Nose: Nose normal.   Eyes:      General: Lids are normal.      Pupils: Pupils are equal, round, and reactive to light.   Neck:      Trachea: No tracheal deviation.   Cardiovascular:      Rate and Rhythm: Normal rate and regular rhythm.      Pulses: No decreased pulses.      Heart sounds: Normal heart sounds. No murmur heard.     No friction rub. No gallop.   Pulmonary:      Effort: Pulmonary effort is normal. No respiratory distress.      Breath sounds: Normal breath sounds. No decreased breath sounds, wheezing, rhonchi or rales.    Abdominal:      General: Abdomen is protuberant. Bowel sounds are normal.      Palpations: Abdomen is soft.      Tenderness: There is no abdominal tenderness. There is no guarding or rebound.      Comments: Abdomen is protuberant consistent with known pregnancy.  Normal bowel sounds, no peritoneal signs.  No pain to palpation.   Musculoskeletal:         General: No deformity. Normal range of motion.      Cervical back: Normal range of motion and neck supple.   Lymphadenopathy:      Cervical: No cervical adenopathy.   Skin:     General: Skin is warm and dry.      Findings: No rash.   Neurological:      Mental Status: She is alert and oriented to person, place, and time.      Cranial Nerves: No cranial nerve deficit.      Sensory: No sensory deficit.   Psychiatric:         Speech: Speech normal.         Behavior: Behavior normal.         Thought Content: Thought content normal.         Judgment: Judgment normal.         Procedures           ED Course                                             Medical Decision Making  Differential diagnosis includes renal insufficiency, electrolyte abnormality, dehydration, infectious process, other unspecified etiology.    The patient denies dysuria.  Urine shows some ketones but does not appear consistent with infection with only trace bacteria and 0-2 white blood cells.    The patient is afebrile and does not have infectious symptoms.    Viral respiratory panel was negative in chest x-ray imaging was not pursued due to the fact that the lungs are clear and the patient does not have acute respiratory symptoms.    The patient reports feeling normal movement of the baby and denies vaginal bleeding or loss of fluid.    Evaluation shows normal kidney function, no significant electrolyte abnormality, normal white count.    The patient was given 2 L of IV fluid and thiamine here in the ER for fluid resuscitation. She did not have any nausea or vomiting while being observed in the  ER.    She will be discharged with the advised to keep outpatient follow-up with her obstetrician, Dr. Pittman.    Problems Addressed:  Acute dehydration: complicated acute illness or injury with systemic symptoms  Hyperemesis gravidarum: complicated acute illness or injury with systemic symptoms    Amount and/or Complexity of Data Reviewed  External Data Reviewed: labs.  Labs: ordered. Decision-making details documented in ED Course.    Risk  Prescription drug management.        Final diagnoses:   Hyperemesis gravidarum   Acute dehydration       ED Disposition  ED Disposition       ED Disposition   Discharge    Condition   Stable    Comment   --               Grey Stroud MD  [address] [facility] [address] [place], [place] [id number]  [phone]    Schedule an appointment as soon as possible for a visit            Medication List      No changes were made to your prescriptions during this visit.            Sai Sanchez MD  07/27/24 3429

## 2024-07-24 NOTE — PROGRESS NOTES
OB FOLLOW UP  CC- Here for care of pregnancy        Riana Anthony is a 34 y.o.  20w0d patient being seen today for her obstetrical follow up visit. Patient reports vericose veins on the back of her legs. Discussed the use of compression socks. She reports her acid reflux is a little worse than the last time she came in but she is not interested in starting any new medications at this time.     Patient reports that the doctor at [facility] is concerned about her weight loss. She has lost about 20lbs throughout her pregnancy so far.     Her prenatal care is complicated by (and status) : see below.  Patient Active Problem List   Diagnosis    Gastroesophageal reflux disease    Supraumbilical hernia    Herpetic vulvovaginitis    Status post bariatric surgery    Anemia    Ventral hernia    Psychosis    Depression    History of classical  section    Nausea/vomiting in pregnancy    Family history of congenital anomaly       Ultrasound Today: Yes @ [facility]. Per patient wants to follow up in 8 weeks.   AFP was already completed and was normal.    ROS -     Patient Denies: leaking of fluid, vaginal bleeding, dysuria, excessive vomiting, and more than 6 contractions per hour  Fetal Movement : Yes  All other systems reviewed and are negative.       The additional following portions of the patient's history were reviewed and updated as appropriate: allergies and current medications.      I have reviewed and agree with the HPI, ROS, and historical information as entered above. Grey Stroud MD      BP 94/68   Wt 91.2 kg (201 lb)   LMP 2024 (Approximate)   BMI 32.44 kg/m²       EXAM:     Prenatal Vitals  BP: 94/68  Weight: 91.2 kg (201 lb)   Fetal Heart Rate: PDC          Urine Glucose Read-only: Negative  Urine Protein Read-only: (!) Trace       Assessment and Plan    Problem List Items Addressed This Visit       Gastroesophageal reflux disease    Overview     2021 refractory acid reflux - referring  to bariatric surgeon for further eval.            Relevant Medications    famotidine (Pepcid) 20 MG tablet    pantoprazole (Protonix) 40 MG EC tablet     Other Visit Diagnoses       Prenatal care, antepartum    -  Primary    Relevant Orders    POC Urinalysis Dipstick (Completed)    Mild hyperemesis gravidarum        Relevant Orders    CBC (No Diff)    Comprehensive Metabolic Panel    Amylase    Lipase    Magnesium    Phosphorus            Pregnancy at 20w0d  Anatomy scan today is  Reviewed normal PDC anatomy scan  Fetal status reassuring.   Activity and Exercise discussed.  Lab(s) Ordered  Medication(s) Ordered  Nausea/Vomiting - desires medication.  Options discussed of Diclegis/Bonjesta, Promethazine, and Zofran  Patient is on Prenatal vitamins  Discussed bASA for PIH prevention from 12 to 36wk  Return in about 4 weeks (around 8/21/2024) for Routine prenatal care.      Grey Stroud MD  07/24/2024

## 2024-07-24 NOTE — ASSESSMENT & PLAN NOTE
Patient reports continuing nausea and vomiting this pregnancy.   notes that she is only able to keep down Gatorade.  All solid food eaten is vomited nearly immediately.  Patient reports 25 pound weight loss this pregnancy.  We can documented 10 pound weight loss in the last month a month and a half.    Ultrasound ultrasound today demonstrates a normally grown fetus with no abnormality seen.  There were no fetal markers for trisomy.  Amniotic fluid volume and cervical length were normal.    The patient with persistent nausea and vomiting would be reasonable to consider admission for this patient.  I would place her at n.p.o. and hydrate her.  I would institute intravenous antiemetics and then attempt to reintroduce food.  Electrolytes should be checked as they may become abnormal with persistent nausea and vomiting.

## 2024-07-24 NOTE — PROGRESS NOTES
"Documentation of the ultrasound findings, images, and interpretations will be available in the patient's Viewpoint report which is located in the imaging tab in chart review.    Maternal/Fetal Medicine Follow Up  Note     Name: Riana Anthony    : 1990     MRN: [id number]     Referring Provider: Grey Stroud MD    Chief Complaint  prev NND    Subjective     History of Present Illness:  Riana Anthony is a 34 y.o.  20w0d who presents today for N&V    NED: Estimated Date of Delivery: 24     ROS:   As noted in HPI.     Objective     Vital Signs  BP 93/62   Wt 91.2 kg (201 lb)   LMP 2024 (Approximate)   Estimated body mass index is 32.44 kg/m² as calculated from the following:    Height as of 24: 167.6 cm (66\").    Weight as of this encounter: 91.2 kg (201 lb).    Physical Exam    Ultrasound Impression:   See Viewpoint    Assessment and Plan     Riana Anthony is a 34 y.o.  20w0d who presents today for N&V    Diagnoses and all orders for this visit:    1. Family history of congenital anomaly (Primary)  Assessment & Plan:  Patient's last pregnancy was complicated by fetal sacrococcygeal teratoma.  The infant  in .  After delivery at 28 weeks gestation due to fetal compromise.  Fortunately recurrence risk for sacrococcygeal teratoma is very small.  Ultrasound today does not demonstrate sacrococcygeal teratoma.  We will rescan the patient again at 28 weeks gestation and once again reassess fetal anatomy.    Orders:  -     [facility]; Future    2. Status post bariatric surgery  -     [facility]; Future    3. History of classical  section  -     [facility]; Future    4. Nausea/vomiting in pregnancy  Assessment & Plan:  Patient reports continuing nausea and vomiting this pregnancy.   notes that she is only able to keep down Gatorade.  All solid food eaten is vomited nearly " immediately.  Patient reports 25 pound weight loss this pregnancy.  We can documented 10 pound weight loss in the last month a month and a half.    Ultrasound ultrasound today demonstrates a normally grown fetus with no abnormality seen.  There were no fetal markers for trisomy.  Amniotic fluid volume and cervical length were normal.    The patient with persistent nausea and vomiting would be reasonable to consider admission for this patient.  I would place her at n.p.o. and hydrate her.  I would institute intravenous antiemetics and then attempt to reintroduce food.  Electrolytes should be checked as they may become abnormal with persistent nausea and vomiting.    Orders:  -     [facility] [place]; Future         Follow Up  Return in about 8 weeks (around 2024).    I spent 20 minutes caring for the patient on the day of service. This included: obtaining or reviewing a separately obtained medical history, reviewing patient records, performing a medically appropriate exam and/or evaluation, counseling or educating the patient/family/caregiver, ordering medications, labs, and/or procedures and documenting such in the medical record. This does not include time spent on review and interpretation of other tests such as fetal ultrasound or the performance of other procedures such as amniocentesis or CVS.      Douglas A. Milligan, MD  Maternal Fetal Medicine, [facility]     2024

## 2024-07-24 NOTE — ASSESSMENT & PLAN NOTE
Patient's last pregnancy was complicated by fetal sacrococcygeal teratoma.  The infant  in .  After delivery at 28 weeks gestation due to fetal compromise.  Fortunately recurrence risk for sacrococcygeal teratoma is very small.  Ultrasound today does not demonstrate sacrococcygeal teratoma.  We will rescan the patient again at 28 weeks gestation and once again reassess fetal anatomy.

## 2024-07-25 PROBLEM — O21.0 MILD HYPEREMESIS GRAVIDARUM: Status: ACTIVE | Noted: 2024-07-25

## 2024-07-25 LAB
ALBUMIN SERPL-MCNC: 4.1 G/DL (ref 3.5–5.2)
ALBUMIN/GLOB SERPL: 1.5 G/DL
ALP SERPL-CCNC: 92 U/L (ref 39–117)
ALT SERPL-CCNC: 10 U/L (ref 1–33)
AMYLASE SERPL-CCNC: 79 U/L (ref 28–100)
AST SERPL-CCNC: 15 U/L (ref 1–32)
BILIRUB SERPL-MCNC: 0.3 MG/DL (ref 0–1.2)
BUN SERPL-MCNC: 3 MG/DL (ref 6–20)
BUN/CREAT SERPL: 5.4 (ref 7–25)
CALCIUM SERPL-MCNC: 9.2 MG/DL (ref 8.6–10.5)
CHLORIDE SERPL-SCNC: 103 MMOL/L (ref 98–107)
CO2 SERPL-SCNC: 21.1 MMOL/L (ref 22–29)
CREAT SERPL-MCNC: 0.56 MG/DL (ref 0.57–1)
EGFRCR SERPLBLD CKD-EPI 2021: 123.8 ML/MIN/1.73
ERYTHROCYTE [DISTWIDTH] IN BLOOD BY AUTOMATED COUNT: 13 % (ref 12.3–15.4)
GLOBULIN SER CALC-MCNC: 2.7 GM/DL
GLUCOSE SERPL-MCNC: 76 MG/DL (ref 65–99)
HCT VFR BLD AUTO: 35.4 % (ref 34–46.6)
HGB BLD-MCNC: 11.4 G/DL (ref 12–15.9)
LIPASE SERPL-CCNC: 12 U/L (ref 13–60)
MAGNESIUM SERPL-MCNC: 2 MG/DL (ref 1.6–2.6)
MCH RBC QN AUTO: 28.9 PG (ref 26.6–33)
MCHC RBC AUTO-ENTMCNC: 32.2 G/DL (ref 31.5–35.7)
MCV RBC AUTO: 89.8 FL (ref 79–97)
PHOSPHATE SERPL-MCNC: 4.5 MG/DL (ref 2.5–4.5)
PLATELET # BLD AUTO: 360 10*3/MM3 (ref 140–450)
POTASSIUM SERPL-SCNC: 4 MMOL/L (ref 3.5–5.2)
PROT SERPL-MCNC: 6.8 G/DL (ref 6–8.5)
RBC # BLD AUTO: 3.94 10*6/MM3 (ref 3.77–5.28)
SODIUM SERPL-SCNC: 137 MMOL/L (ref 136–145)
WBC # BLD AUTO: 9.72 10*3/MM3 (ref 3.4–10.8)

## 2024-07-25 RX ORDER — SODIUM CHLORIDE, SODIUM LACTATE, POTASSIUM CHLORIDE, CALCIUM CHLORIDE 600; 310; 30; 20 MG/100ML; MG/100ML; MG/100ML; MG/100ML
150 INJECTION, SOLUTION INTRAVENOUS ONCE
Status: CANCELLED | OUTPATIENT
Start: 2024-07-26

## 2024-07-25 RX ORDER — PROCHLORPERAZINE MALEATE 10 MG
10 TABLET ORAL ONCE
OUTPATIENT
Start: 2024-07-26

## 2024-07-30 ENCOUNTER — HOSPITAL ENCOUNTER (OUTPATIENT)
Facility: HOSPITAL | Age: 34
Discharge: HOME | End: 2024-07-30
Admitting: OBSTETRICS & GYNECOLOGY
Payer: MEDICAID

## 2024-07-30 VITALS
TEMPERATURE: 98.2 F | HEIGHT: 66 IN | RESPIRATION RATE: 16 BRPM | HEART RATE: 78 BPM | BODY MASS INDEX: 32.82 KG/M2 | SYSTOLIC BLOOD PRESSURE: 95 MMHG | DIASTOLIC BLOOD PRESSURE: 65 MMHG | WEIGHT: 204.2 LBS

## 2024-07-30 DIAGNOSIS — O21.0 MILD HYPEREMESIS GRAVIDARUM: Primary | ICD-10-CM

## 2024-07-30 PROCEDURE — 63710000001 PROCHLORPERAZINE MALEATE PER 10 MG: Performed by: OBSTETRICS & GYNECOLOGY

## 2024-07-30 PROCEDURE — 25810000003 LACTATED RINGERS PER 1000 ML: Performed by: OBSTETRICS & GYNECOLOGY

## 2024-07-30 PROCEDURE — 96360 HYDRATION IV INFUSION INIT: CPT

## 2024-07-30 RX ORDER — SODIUM CHLORIDE, SODIUM LACTATE, POTASSIUM CHLORIDE, CALCIUM CHLORIDE 600; 310; 30; 20 MG/100ML; MG/100ML; MG/100ML; MG/100ML
999 INJECTION, SOLUTION INTRAVENOUS ONCE
Status: COMPLETED | OUTPATIENT
Start: 2024-07-30 | End: 2024-07-30

## 2024-07-30 RX ORDER — PROCHLORPERAZINE MALEATE 10 MG
10 TABLET ORAL ONCE
OUTPATIENT
Start: 2024-07-30

## 2024-07-30 RX ORDER — SODIUM CHLORIDE, SODIUM LACTATE, POTASSIUM CHLORIDE, CALCIUM CHLORIDE 600; 310; 30; 20 MG/100ML; MG/100ML; MG/100ML; MG/100ML
999 INJECTION, SOLUTION INTRAVENOUS ONCE
OUTPATIENT
Start: 2024-07-30

## 2024-07-30 RX ORDER — PROCHLORPERAZINE MALEATE 10 MG
10 TABLET ORAL ONCE
Status: COMPLETED | OUTPATIENT
Start: 2024-07-30 | End: 2024-07-30

## 2024-07-30 RX ADMIN — PROCHLORPERAZINE MALEATE 10 MG: 10 TABLET ORAL at 10:28

## 2024-07-30 RX ADMIN — SODIUM CHLORIDE, POTASSIUM CHLORIDE, SODIUM LACTATE AND CALCIUM CHLORIDE 999 ML/HR: 600; 310; 30; 20 INJECTION, SOLUTION INTRAVENOUS at 10:30

## 2024-08-06 ENCOUNTER — HOSPITAL ENCOUNTER (INPATIENT)
Facility: HOSPITAL | Age: 34
LOS: 4 days | Discharge: HOME | End: 2024-08-10
Attending: OBSTETRICS & GYNECOLOGY | Admitting: OBSTETRICS & GYNECOLOGY
Payer: MEDICAID

## 2024-08-06 ENCOUNTER — APPOINTMENT (OUTPATIENT)
Dept: WOMENS IMAGING | Facility: HOSPITAL | Age: 34
End: 2024-08-06
Payer: MEDICAID

## 2024-08-06 ENCOUNTER — APPOINTMENT (OUTPATIENT)
Dept: CT IMAGING | Facility: HOSPITAL | Age: 34
End: 2024-08-06
Payer: MEDICAID

## 2024-08-06 DIAGNOSIS — S37.69XA: ICD-10-CM

## 2024-08-06 DIAGNOSIS — Z98.891 STATUS POST EMERGENCY CESAREAN SECTION: Primary | ICD-10-CM

## 2024-08-06 PROBLEM — O26.899 ABDOMINAL PAIN AFFECTING PREGNANCY: Status: ACTIVE | Noted: 2024-08-06

## 2024-08-06 PROBLEM — R10.9 ABDOMINAL PAIN AFFECTING PREGNANCY: Status: ACTIVE | Noted: 2024-08-06

## 2024-08-06 LAB
ABO GROUP BLD: NORMAL
ALBUMIN SERPL-MCNC: 3.1 G/DL (ref 3.5–5.2)
ALBUMIN SERPL-MCNC: 3.6 G/DL (ref 3.5–5.2)
ALBUMIN/GLOB SERPL: 1.2 G/DL
ALBUMIN/GLOB SERPL: 1.3 G/DL
ALP SERPL-CCNC: 80 U/L (ref 39–117)
ALP SERPL-CCNC: 81 U/L (ref 39–117)
ALT SERPL W P-5'-P-CCNC: 8 U/L (ref 1–33)
ALT SERPL W P-5'-P-CCNC: 8 U/L (ref 1–33)
ANION GAP SERPL CALCULATED.3IONS-SCNC: 10 MMOL/L (ref 5–15)
ANION GAP SERPL CALCULATED.3IONS-SCNC: 16 MMOL/L (ref 5–15)
AST SERPL-CCNC: 16 U/L (ref 1–32)
AST SERPL-CCNC: 19 U/L (ref 1–32)
BACTERIA UR QL AUTO: ABNORMAL /HPF
BASOPHILS # BLD AUTO: 0.02 10*3/MM3 (ref 0–0.2)
BASOPHILS # BLD AUTO: 0.06 10*3/MM3 (ref 0–0.2)
BASOPHILS NFR BLD AUTO: 0.2 % (ref 0–1.5)
BASOPHILS NFR BLD AUTO: 0.4 % (ref 0–1.5)
BILIRUB SERPL-MCNC: 0.3 MG/DL (ref 0–1.2)
BILIRUB SERPL-MCNC: 0.3 MG/DL (ref 0–1.2)
BILIRUB UR QL STRIP: NEGATIVE
BLD GP AB SCN SERPL QL: NEGATIVE
BUN SERPL-MCNC: 4 MG/DL (ref 6–20)
BUN SERPL-MCNC: 4 MG/DL (ref 6–20)
BUN/CREAT SERPL: 7.8 (ref 7–25)
BUN/CREAT SERPL: 8.7 (ref 7–25)
CALCIUM SPEC-SCNC: 8.3 MG/DL (ref 8.6–10.5)
CALCIUM SPEC-SCNC: 8.7 MG/DL (ref 8.6–10.5)
CHLORIDE SERPL-SCNC: 102 MMOL/L (ref 98–107)
CHLORIDE SERPL-SCNC: 104 MMOL/L (ref 98–107)
CLARITY UR: CLEAR
CO2 SERPL-SCNC: 20 MMOL/L (ref 22–29)
CO2 SERPL-SCNC: 21 MMOL/L (ref 22–29)
COLOR UR: YELLOW
CREAT SERPL-MCNC: 0.46 MG/DL (ref 0.57–1)
CREAT SERPL-MCNC: 0.51 MG/DL (ref 0.57–1)
D-LACTATE SERPL-SCNC: 1.6 MMOL/L (ref 0.5–2)
D-LACTATE SERPL-SCNC: 1.8 MMOL/L (ref 0.5–2)
DEPRECATED RDW RBC AUTO: 42.2 FL (ref 37–54)
DEPRECATED RDW RBC AUTO: 42.5 FL (ref 37–54)
EGFRCR SERPLBLD CKD-EPI 2021: 126.6 ML/MIN/1.73
EGFRCR SERPLBLD CKD-EPI 2021: 129.8 ML/MIN/1.73
EOSINOPHIL # BLD AUTO: 0.06 10*3/MM3 (ref 0–0.4)
EOSINOPHIL # BLD AUTO: 0.25 10*3/MM3 (ref 0–0.4)
EOSINOPHIL NFR BLD AUTO: 0.5 % (ref 0.3–6.2)
EOSINOPHIL NFR BLD AUTO: 1.8 % (ref 0.3–6.2)
ERYTHROCYTE [DISTWIDTH] IN BLOOD BY AUTOMATED COUNT: 13.2 % (ref 12.3–15.4)
ERYTHROCYTE [DISTWIDTH] IN BLOOD BY AUTOMATED COUNT: 13.2 % (ref 12.3–15.4)
FIBRINOGEN PPP-MCNC: 390 MG/DL (ref 203–470)
FIBRINOGEN PPP-MCNC: 469 MG/DL (ref 203–470)
GLOBULIN UR ELPH-MCNC: 2.3 GM/DL
GLOBULIN UR ELPH-MCNC: 2.9 GM/DL
GLUCOSE SERPL-MCNC: 107 MG/DL (ref 65–99)
GLUCOSE SERPL-MCNC: 89 MG/DL (ref 65–99)
GLUCOSE UR STRIP-MCNC: NEGATIVE MG/DL
HCT VFR BLD AUTO: 26.9 % (ref 34–46.6)
HCT VFR BLD AUTO: 34.2 % (ref 34–46.6)
HGB BLD-MCNC: 11.4 G/DL (ref 12–15.9)
HGB BLD-MCNC: 8.9 G/DL (ref 12–15.9)
HGB UR QL STRIP.AUTO: NEGATIVE
HYALINE CASTS UR QL AUTO: ABNORMAL /LPF
IMM GRANULOCYTES # BLD AUTO: 0.07 10*3/MM3 (ref 0–0.05)
IMM GRANULOCYTES # BLD AUTO: 0.07 10*3/MM3 (ref 0–0.05)
IMM GRANULOCYTES NFR BLD AUTO: 0.5 % (ref 0–0.5)
IMM GRANULOCYTES NFR BLD AUTO: 0.6 % (ref 0–0.5)
KETONES UR QL STRIP: ABNORMAL
LEUKOCYTE ESTERASE UR QL STRIP.AUTO: NEGATIVE
LIPASE SERPL-CCNC: 10 U/L (ref 13–60)
LYMPHOCYTES # BLD AUTO: 1.84 10*3/MM3 (ref 0.7–3.1)
LYMPHOCYTES # BLD AUTO: 3.76 10*3/MM3 (ref 0.7–3.1)
LYMPHOCYTES NFR BLD AUTO: 16.1 % (ref 19.6–45.3)
LYMPHOCYTES NFR BLD AUTO: 27.1 % (ref 19.6–45.3)
MCH RBC QN AUTO: 29.1 PG (ref 26.6–33)
MCH RBC QN AUTO: 29.2 PG (ref 26.6–33)
MCHC RBC AUTO-ENTMCNC: 33.1 G/DL (ref 31.5–35.7)
MCHC RBC AUTO-ENTMCNC: 33.3 G/DL (ref 31.5–35.7)
MCV RBC AUTO: 87.5 FL (ref 79–97)
MCV RBC AUTO: 87.9 FL (ref 79–97)
MONOCYTES # BLD AUTO: 0.48 10*3/MM3 (ref 0.1–0.9)
MONOCYTES # BLD AUTO: 0.68 10*3/MM3 (ref 0.1–0.9)
MONOCYTES NFR BLD AUTO: 4.2 % (ref 5–12)
MONOCYTES NFR BLD AUTO: 4.9 % (ref 5–12)
MUCOUS THREADS URNS QL MICRO: ABNORMAL /HPF
NEUTROPHILS NFR BLD AUTO: 65.3 % (ref 42.7–76)
NEUTROPHILS NFR BLD AUTO: 78.4 % (ref 42.7–76)
NEUTROPHILS NFR BLD AUTO: 8.93 10*3/MM3 (ref 1.7–7)
NEUTROPHILS NFR BLD AUTO: 9.08 10*3/MM3 (ref 1.7–7)
NITRITE UR QL STRIP: NEGATIVE
NRBC BLD AUTO-RTO: 0 /100 WBC (ref 0–0.2)
NRBC BLD AUTO-RTO: 0 /100 WBC (ref 0–0.2)
PH UR STRIP.AUTO: 7 [PH] (ref 5–8)
PLATELET # BLD AUTO: 302 10*3/MM3 (ref 140–450)
PLATELET # BLD AUTO: 368 10*3/MM3 (ref 140–450)
PMV BLD AUTO: 10.1 FL (ref 6–12)
PMV BLD AUTO: 9.9 FL (ref 6–12)
POTASSIUM SERPL-SCNC: 3.7 MMOL/L (ref 3.5–5.2)
POTASSIUM SERPL-SCNC: 4.1 MMOL/L (ref 3.5–5.2)
PROT SERPL-MCNC: 5.4 G/DL (ref 6–8.5)
PROT SERPL-MCNC: 6.5 G/DL (ref 6–8.5)
PROT UR QL STRIP: ABNORMAL
RBC # BLD AUTO: 3.06 10*6/MM3 (ref 3.77–5.28)
RBC # BLD AUTO: 3.91 10*6/MM3 (ref 3.77–5.28)
RBC # UR STRIP: ABNORMAL /HPF
REF LAB TEST METHOD: ABNORMAL
RENAL EPI CELLS #/AREA URNS HPF: ABNORMAL /HPF
RH BLD: POSITIVE
SODIUM SERPL-SCNC: 135 MMOL/L (ref 136–145)
SODIUM SERPL-SCNC: 138 MMOL/L (ref 136–145)
SP GR UR STRIP: 1.02 (ref 1–1.03)
SQUAMOUS #/AREA URNS HPF: ABNORMAL /HPF
T&S EXPIRATION DATE: NORMAL
UROBILINOGEN UR QL STRIP: ABNORMAL
WBC # UR STRIP: ABNORMAL /HPF
WBC NRBC COR # BLD AUTO: 11.4 10*3/MM3 (ref 3.4–10.8)
WBC NRBC COR # BLD AUTO: 13.9 10*3/MM3 (ref 3.4–10.8)

## 2024-08-06 PROCEDURE — 86923 COMPATIBILITY TEST ELECTRIC: CPT

## 2024-08-06 PROCEDURE — 80053 COMPREHEN METABOLIC PANEL: CPT | Performed by: OBSTETRICS & GYNECOLOGY

## 2024-08-06 PROCEDURE — 25010000002 HYDROMORPHONE PER 4 MG: Performed by: OBSTETRICS & GYNECOLOGY

## 2024-08-06 PROCEDURE — 25810000003 DEXTROSE 5 % AND SODIUM CHLORIDE 0.9 % 5-0.9 % SOLUTION: Performed by: OBSTETRICS & GYNECOLOGY

## 2024-08-06 PROCEDURE — 74178 CT ABD&PLV WO CNTR FLWD CNTR: CPT

## 2024-08-06 PROCEDURE — 76815 OB US LIMITED FETUS(S): CPT

## 2024-08-06 PROCEDURE — 25010000002 THIAMINE PER 100 MG: Performed by: OBSTETRICS & GYNECOLOGY

## 2024-08-06 PROCEDURE — 86901 BLOOD TYPING SEROLOGIC RH(D): CPT | Performed by: OBSTETRICS & GYNECOLOGY

## 2024-08-06 PROCEDURE — 25510000001 IOPAMIDOL 61 % SOLUTION: Performed by: OBSTETRICS & GYNECOLOGY

## 2024-08-06 PROCEDURE — 83690 ASSAY OF LIPASE: CPT | Performed by: OBSTETRICS & GYNECOLOGY

## 2024-08-06 PROCEDURE — 83605 ASSAY OF LACTIC ACID: CPT | Performed by: OBSTETRICS & GYNECOLOGY

## 2024-08-06 PROCEDURE — 81001 URINALYSIS AUTO W/SCOPE: CPT | Performed by: OBSTETRICS & GYNECOLOGY

## 2024-08-06 PROCEDURE — 63710000001 DIPHENHYDRAMINE PER 50 MG: Performed by: OBSTETRICS & GYNECOLOGY

## 2024-08-06 PROCEDURE — 25810000003 LACTATED RINGERS PER 1000 ML: Performed by: OBSTETRICS & GYNECOLOGY

## 2024-08-06 PROCEDURE — 99222 1ST HOSP IP/OBS MODERATE 55: CPT | Performed by: OBSTETRICS & GYNECOLOGY

## 2024-08-06 PROCEDURE — 25810000003 LACTATED RINGERS SOLUTION: Performed by: OBSTETRICS & GYNECOLOGY

## 2024-08-06 PROCEDURE — 59350 REPAIR OF UTERUS: CPT | Performed by: OBSTETRICS & GYNECOLOGY

## 2024-08-06 PROCEDURE — 85025 COMPLETE CBC W/AUTO DIFF WBC: CPT | Performed by: OBSTETRICS & GYNECOLOGY

## 2024-08-06 PROCEDURE — 85384 FIBRINOGEN ACTIVITY: CPT | Performed by: OBSTETRICS & GYNECOLOGY

## 2024-08-06 PROCEDURE — 25010000002 ONDANSETRON PER 1 MG: Performed by: OBSTETRICS & GYNECOLOGY

## 2024-08-06 PROCEDURE — 86900 BLOOD TYPING SEROLOGIC ABO: CPT | Performed by: OBSTETRICS & GYNECOLOGY

## 2024-08-06 PROCEDURE — 25010000002 HYDROMORPHONE 1 MG/ML SOLUTION: Performed by: OBSTETRICS & GYNECOLOGY

## 2024-08-06 PROCEDURE — 76815 OB US LIMITED FETUS(S): CPT | Performed by: OBSTETRICS & GYNECOLOGY

## 2024-08-06 PROCEDURE — 36415 COLL VENOUS BLD VENIPUNCTURE: CPT | Performed by: OBSTETRICS & GYNECOLOGY

## 2024-08-06 PROCEDURE — 86850 RBC ANTIBODY SCREEN: CPT | Performed by: OBSTETRICS & GYNECOLOGY

## 2024-08-06 RX ORDER — SODIUM CHLORIDE, SODIUM LACTATE, POTASSIUM CHLORIDE, CALCIUM CHLORIDE 600; 310; 30; 20 MG/100ML; MG/100ML; MG/100ML; MG/100ML
125 INJECTION, SOLUTION INTRAVENOUS CONTINUOUS
Status: DISCONTINUED | OUTPATIENT
Start: 2024-08-06 | End: 2024-08-07

## 2024-08-06 RX ORDER — DIPHENHYDRAMINE HCL 25 MG
25 CAPSULE ORAL EVERY 6 HOURS PRN
Status: DISCONTINUED | OUTPATIENT
Start: 2024-08-06 | End: 2024-08-06

## 2024-08-06 RX ORDER — AMOXICILLIN 250 MG
2 CAPSULE ORAL ONCE
Status: COMPLETED | OUTPATIENT
Start: 2024-08-06 | End: 2024-08-06

## 2024-08-06 RX ORDER — INDOMETHACIN 25 MG/1
100 CAPSULE ORAL ONCE
Status: COMPLETED | OUTPATIENT
Start: 2024-08-06 | End: 2024-08-06

## 2024-08-06 RX ORDER — PROMETHAZINE HYDROCHLORIDE 12.5 MG/1
12.5 TABLET ORAL EVERY 6 HOURS PRN
COMMUNITY

## 2024-08-06 RX ORDER — SODIUM, POTASSIUM,MAG SULFATES 17.5-3.13G
1 SOLUTION, RECONSTITUTED, ORAL ORAL EVERY 12 HOURS
Status: COMPLETED | OUTPATIENT
Start: 2024-08-06 | End: 2024-08-07

## 2024-08-06 RX ORDER — HYDROMORPHONE HYDROCHLORIDE 1 MG/ML
0.5 INJECTION, SOLUTION INTRAMUSCULAR; INTRAVENOUS; SUBCUTANEOUS ONCE
Status: COMPLETED | OUTPATIENT
Start: 2024-08-06 | End: 2024-08-06

## 2024-08-06 RX ORDER — POLYETHYLENE GLYCOL 3350 17 G/17G
17 POWDER, FOR SOLUTION ORAL 2 TIMES DAILY
Status: DISCONTINUED | OUTPATIENT
Start: 2024-08-06 | End: 2024-08-06

## 2024-08-06 RX ORDER — SODIUM CHLORIDE 0.9 % (FLUSH) 0.9 %
10 SYRINGE (ML) INJECTION AS NEEDED
Status: DISCONTINUED | OUTPATIENT
Start: 2024-08-06 | End: 2024-08-10 | Stop reason: HOSPADM

## 2024-08-06 RX ORDER — SODIUM CHLORIDE 0.9 % (FLUSH) 0.9 %
10 SYRINGE (ML) INJECTION EVERY 12 HOURS SCHEDULED
Status: DISCONTINUED | OUTPATIENT
Start: 2024-08-06 | End: 2024-08-10 | Stop reason: HOSPADM

## 2024-08-06 RX ORDER — ONDANSETRON 2 MG/ML
4 INJECTION INTRAMUSCULAR; INTRAVENOUS EVERY 8 HOURS PRN
Status: DISCONTINUED | OUTPATIENT
Start: 2024-08-06 | End: 2024-08-07

## 2024-08-06 RX ORDER — ONDANSETRON 4 MG/1
8 TABLET, ORALLY DISINTEGRATING ORAL EVERY 8 HOURS PRN
Status: DISCONTINUED | OUTPATIENT
Start: 2024-08-06 | End: 2024-08-07

## 2024-08-06 RX ORDER — INDOMETHACIN 25 MG/1
50 CAPSULE ORAL EVERY 4 HOURS
Status: DISCONTINUED | OUTPATIENT
Start: 2024-08-06 | End: 2024-08-08

## 2024-08-06 RX ORDER — DOCUSATE SODIUM 100 MG/1
100 CAPSULE, LIQUID FILLED ORAL 2 TIMES DAILY
Status: DISCONTINUED | OUTPATIENT
Start: 2024-08-07 | End: 2024-08-06

## 2024-08-06 RX ORDER — SODIUM CHLORIDE 9 MG/ML
40 INJECTION, SOLUTION INTRAVENOUS AS NEEDED
Status: DISCONTINUED | OUTPATIENT
Start: 2024-08-06 | End: 2024-08-10 | Stop reason: HOSPADM

## 2024-08-06 RX ORDER — LIDOCAINE HYDROCHLORIDE 10 MG/ML
0.5 INJECTION, SOLUTION EPIDURAL; INFILTRATION; INTRACAUDAL; PERINEURAL ONCE AS NEEDED
Status: DISCONTINUED | OUTPATIENT
Start: 2024-08-06 | End: 2024-08-10 | Stop reason: HOSPADM

## 2024-08-06 RX ORDER — DEXTROSE MONOHYDRATE AND SODIUM CHLORIDE 5; .9 G/100ML; G/100ML
50 INJECTION, SOLUTION INTRAVENOUS CONTINUOUS
Status: DISCONTINUED | OUTPATIENT
Start: 2024-08-06 | End: 2024-08-07

## 2024-08-06 RX ORDER — LAMOTRIGINE 100 MG/1
100 TABLET ORAL DAILY
Status: DISCONTINUED | OUTPATIENT
Start: 2024-08-06 | End: 2024-08-10 | Stop reason: HOSPADM

## 2024-08-06 RX ORDER — DIPHENHYDRAMINE HCL 50 MG
50 CAPSULE ORAL EVERY 6 HOURS PRN
Status: DISCONTINUED | OUTPATIENT
Start: 2024-08-06 | End: 2024-08-09

## 2024-08-06 RX ORDER — BISACODYL 10 MG
10 SUPPOSITORY, RECTAL RECTAL ONCE
Status: COMPLETED | OUTPATIENT
Start: 2024-08-06 | End: 2024-08-06

## 2024-08-06 RX ORDER — ACETAMINOPHEN 325 MG/1
650 TABLET ORAL EVERY 4 HOURS PRN
Status: DISCONTINUED | OUTPATIENT
Start: 2024-08-06 | End: 2024-08-10 | Stop reason: HOSPADM

## 2024-08-06 RX ORDER — FAMOTIDINE 20 MG/1
20 TABLET, FILM COATED ORAL 2 TIMES DAILY PRN
Status: DISCONTINUED | OUTPATIENT
Start: 2024-08-06 | End: 2024-08-10 | Stop reason: HOSPADM

## 2024-08-06 RX ORDER — DOCUSATE SODIUM 100 MG/1
100 CAPSULE, LIQUID FILLED ORAL 2 TIMES DAILY
Status: DISCONTINUED | OUTPATIENT
Start: 2024-08-06 | End: 2024-08-06

## 2024-08-06 RX ADMIN — DOCUSATE SODIUM 100 MG: 100 CAPSULE, LIQUID FILLED ORAL at 09:38

## 2024-08-06 RX ADMIN — HYDROMORPHONE HYDROCHLORIDE 0.5 MG: 1 INJECTION, SOLUTION INTRAMUSCULAR; INTRAVENOUS; SUBCUTANEOUS at 08:06

## 2024-08-06 RX ADMIN — SERTRALINE HYDROCHLORIDE 25 MG: 50 TABLET ORAL at 23:21

## 2024-08-06 RX ADMIN — SODIUM SULFATE, POTASSIUM SULFATE, MAGNESIUM SULFATE 1 BOTTLE: 1.6; 3.13; 17.5 SOLUTION ORAL at 23:02

## 2024-08-06 RX ADMIN — SODIUM CHLORIDE, POTASSIUM CHLORIDE, SODIUM LACTATE AND CALCIUM CHLORIDE 125 ML/HR: 600; 310; 30; 20 INJECTION, SOLUTION INTRAVENOUS at 08:51

## 2024-08-06 RX ADMIN — SODIUM CHLORIDE, POTASSIUM CHLORIDE, SODIUM LACTATE AND CALCIUM CHLORIDE 1000 ML: 600; 310; 30; 20 INJECTION, SOLUTION INTRAVENOUS at 06:03

## 2024-08-06 RX ADMIN — HYDROMORPHONE HYDROCHLORIDE 1 MG: 1 INJECTION, SOLUTION INTRAMUSCULAR; INTRAVENOUS; SUBCUTANEOUS at 06:44

## 2024-08-06 RX ADMIN — FOLIC ACID 125 ML/HR: 5 INJECTION, SOLUTION INTRAMUSCULAR; INTRAVENOUS; SUBCUTANEOUS at 11:34

## 2024-08-06 RX ADMIN — HYDROMORPHONE HYDROCHLORIDE 1 MG: 1 INJECTION, SOLUTION INTRAMUSCULAR; INTRAVENOUS; SUBCUTANEOUS at 12:36

## 2024-08-06 RX ADMIN — LAMOTRIGINE 100 MG: 100 TABLET ORAL at 23:21

## 2024-08-06 RX ADMIN — ONDANSETRON 4 MG: 2 INJECTION INTRAMUSCULAR; INTRAVENOUS at 23:24

## 2024-08-06 RX ADMIN — HYDROMORPHONE HYDROCHLORIDE 1 MG: 1 INJECTION, SOLUTION INTRAMUSCULAR; INTRAVENOUS; SUBCUTANEOUS at 18:42

## 2024-08-06 RX ADMIN — DOCUSATE SODIUM 100 MG: 100 CAPSULE, LIQUID FILLED ORAL at 21:06

## 2024-08-06 RX ADMIN — DEXTROSE AND SODIUM CHLORIDE 125 ML/HR: 5; 900 INJECTION, SOLUTION INTRAVENOUS at 21:06

## 2024-08-06 RX ADMIN — IOPAMIDOL 80 ML: 612 INJECTION, SOLUTION INTRAVENOUS at 20:45

## 2024-08-06 RX ADMIN — DIPHENHYDRAMINE HYDROCHLORIDE 25 MG: 25 CAPSULE ORAL at 13:15

## 2024-08-06 RX ADMIN — INDOMETHACIN 50 MG: 25 CAPSULE ORAL at 21:06

## 2024-08-06 RX ADMIN — HYDROMORPHONE HYDROCHLORIDE 1 MG: 1 INJECTION, SOLUTION INTRAMUSCULAR; INTRAVENOUS; SUBCUTANEOUS at 23:04

## 2024-08-06 RX ADMIN — INDOMETHACIN 50 MG: 25 CAPSULE ORAL at 13:15

## 2024-08-06 RX ADMIN — SENNOSIDES AND DOCUSATE SODIUM 2 TABLET: 50; 8.6 TABLET ORAL at 06:55

## 2024-08-06 RX ADMIN — BISACODYL 10 MG: 10 SUPPOSITORY RECTAL at 12:37

## 2024-08-06 RX ADMIN — INDOMETHACIN 100 MG: 25 CAPSULE ORAL at 09:38

## 2024-08-06 RX ADMIN — SODIUM CHLORIDE, POTASSIUM CHLORIDE, SODIUM LACTATE AND CALCIUM CHLORIDE 1000 ML: 600; 310; 30; 20 INJECTION, SOLUTION INTRAVENOUS at 05:33

## 2024-08-06 RX ADMIN — INDOMETHACIN 50 MG: 25 CAPSULE ORAL at 16:48

## 2024-08-06 RX ADMIN — DIPHENHYDRAMINE HYDROCHLORIDE 25 MG: 25 CAPSULE ORAL at 15:53

## 2024-08-06 NOTE — PROGRESS NOTES
Patient reports feeling much improved   Having some generalized itching   Received suppository, no BM yet   S/p banana bag and IV bolus   Tolerating PO   No abd pain, vomiting, LOF, VB     NAD   Abd soft, NT    [place] none     Plan:   Continue indocin   Benadryl prn itching   Continue bowel regimen   Regular diet   Cont IV hydration  Monitor overnight   Anticipate d/c tomorrow AM

## 2024-08-06 NOTE — H&P
"[facility]  Obstetric History and Physical    Chief Complaint   Patient presents with    Contractions       HPI:      Patient is a 34 y.o. female  currently at 21w6d, who presents with acute lower abdominal pain and lower back pain that woke her up around 0200 hrs.  She tried getting in the shower and using the toilet, but nothing helped and it only got worse.   She reports some nausea from the pain, but no vomiting, fever or chills.  She denies vaginal leaking and bleeding.   She denies trauma.   Denies recent intercourse.   She denies urinary symptoms.         The following portions of the patients history were reviewed and updated as appropriate: current medications, allergies, past medical history, past surgical history, past family history, past social history and problem list .       Prenatal Information:   Maternal Prenatal Labs  Blood Type ABO Type   Date Value Ref Range Status   2024 O  Final      Rh Status RH type   Date Value Ref Range Status   2024 Positive  Final      Antibody Screen Antibody Screen   Date Value Ref Range Status   2024 Negative  Final      Gonnorhea No results found for: \"GCCX\"   Chlamydia No results found for: \"CLAMYDCU\"   RPR No results found for: \"RPR\"   Syphilis Antibody No results found for: \"SYPHILIS\"   Rubella No results found for: \"RUBELLAIGGIN\"   Hepatitis B Surface Antigen No results found for: \"HEPBSAG\"   HIV-1 Antibody No results found for: \"LABHIV1\"   Hepatitis C Antibody No results found for: \"HEPCAB\"   Rapid Urin Drug Screen No results found for: \"AMPMETHU\", \"BARBITSCNUR\", \"LABBENZSCN\", \"LABMETHSCN\", \"LABOPIASCN\", \"THCURSCR\", \"COCAINEUR\", \"AMPHETSCREEN\", \"PROPOXSCN\", \"BUPRENORSCNU\", \"METAMPSCNUR\", \"OXYCODONESCN\", \"TRICYCLICSCN\"   Group B Strep Culture No results found for: \"GBSANTIGEN\"           External Prenatal Results       Pregnancy Outside Results - Transcribed From Office Records - See Scanned Records For Details       Test Value Date Time "    ABO  O  24 0516    Rh  Positive  24 0516    Antibody Screen  Negative  24 0516       Negative  24 1450    Varicella IgG       Rubella  3.78 index 24 1450    Hgb  11.4 g/dL 24 0516       10.8 g/dL 24 1556       11.4 g/dL 24 1342       13.3 g/dL 24 1450       14.0 g/dL 24 1555    Hct  34.2 % 24 0516       32.4 % 24 1556       35.4 % 24 1342       41.0 % 24 1450       42.1 % 24 1555    HgB A1c        1h GTT       3h GTT Fasting       3h GTT 1 hour       3h GTT 2 hour       3h GTT 3 hour        Gonorrhea (discrete)  Negative  24 1450    Chlamydia (discrete)  Negative  24 1450    RPR  Non Reactive  24 1450    Syphils cascade: TP-Ab (FTA)       TP-Ab       TP-Ab (EIA)       TPPA       HBsAg  Negative  24 1450    Herpes Simplex Virus PCR       Herpes Simplex VIrus Culture       HIV  Non Reactive  24 1450    Hep C RNA Quant PCR       Hep C Antibody  Non Reactive  24 1450    AFP  27.6 ng/mL 24 0938    NIPT       Cystic Fibroisis        Group B Strep       GBS Susceptibility to Clindamycin       GBS Susceptibility to Erythromycin       Fetal Fibronectin       Genetic Testing, Maternal Blood                 Drug Screening       Test Value Date Time    Urine Drug Screen       Amphetamine Screen  Negative ng/mL 24 1450    Barbiturate Screen  Negative ng/mL 24 1450    Benzodiazepine Screen  Negative ng/mL 24 1450    Methadone Screen  Negative ng/mL 24 1450    Phencyclidine Screen  Negative ng/mL 24 1450    Opiates Screen       THC Screen       Cocaine Screen       Propoxyphene Screen  Negative ng/mL 24 1450    Buprenorphine Screen       Methamphetamine Screen       Oxycodone Screen       Tricyclic Antidepressants Screen                 Legend    ^: Historical                              Past OB History:     OB History    Para Term  AB Living   6 4  3 1 1 3   SAB IAB Ectopic Molar Multiple Live Births   1 0 0 0 0 4      # Outcome Date GA Lbr Alen/2nd Weight Sex Type Anes PTL Lv   6 Current            5  22 28w0d   F CS-Unspec Gen, Combined spi N ND      Name: STEPHANIE NAVA      Apgar1: 0  Apgar5: 0   4 SAB 2021 10w0d             Birth Comments: no D&C   3 Term 07/27/15 39w0d  3118 g (6 lb 14 oz) M Vag-Spont  Y ALICE      Birth Comments: NATHANIEL from dehydration at 20 weeks; Elective induction      Complications: Status post induction of labor   2 Term 12 39w0d  3062 g (6 lb 12 oz) M Vag-Spont  N ALICE      Birth Comments: Elective induction      Complications: Status post induction of labor   1 Term 11 39w0d  4026 g (8 lb 14 oz) M Vag-Spont  N ALICE      Birth Comments: induced for LGA      Complications: Status post induction of labor      Obstetric Comments   FOB #1 : Pregnancy # 1-6       Past Medical History: Past Medical History:   Diagnosis Date    Anemia     Back pain     Bipolar disorder     Cholelithiasis     Depression     Fetal sacrococcygeal teratoma affecting antepartum care of mother 2022    GERD (gastroesophageal reflux disease)     on BID PPI    History of transfusion     PATIENT DENIES REACTION    HL (hearing loss)     both ears, wears hearing aides    HSV-2 infection     last outbreak around     Hyperemesis gravidarum     pt states she always throws up every time she eats. She states she only drinks Gatorade.    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  Follows with psychiatry, on Prozac, previously on Zyprexa.    Recurrent pregnancy loss, antepartum condition or complication       Past Surgical History Past Surgical History:   Procedure Laterality Date    ABDOMINOPLASTY N/A 2022    Procedure: ABDOMINOPLASTY WITH LIPOSUCTION;  Surgeon: Frederick Betts MD;  Location: [facility] OR;  Service:  Plastics;  Laterality: N/A;    BREAST AUGMENTATION Bilateral 2022    Procedure: BREAST AUGMENTATION BILATERAL WITH SILICONE IMPLANTS;  Surgeon: Frederick Betts MD;  Location:  [facility] OR;  Service: Plastics;  Laterality: Bilateral;     SECTION PRIMARY  2022    CHOLECYSTECTOMY N/A 2021    Procedure: CHOLECYSTECTOMY LAPAROSCOPIC;  Surgeon: Amrita Roche MD;  Location:  [facility] OR;  Service: General;  Laterality: N/A;    GASTRIC SLEEVE LAPAROSCOPIC  20.  [address].  op note rviewed    HERNIA REPAIR      VENTRAL/INCISIONAL HERNIA REPAIR N/A 2022    Procedure: VENTRAL HERNIA REPAIR OPEN;  Surgeon: Segundo Morales MD;  Location:  [facility] OR;  Service: General;  Laterality: N/A;      Family History: Family History   Problem Relation Age of Onset    No Known Problems Mother     No Known Problems Father     No Known Problems Sister     No Known Problems Brother     No Known Problems Maternal Grandmother     No Known Problems Maternal Grandfather     No Known Problems Paternal Grandmother     No Known Problems Paternal Grandfather       Social History:  reports that she has never smoked. She has never used smokeless tobacco.   reports no history of alcohol use.   reports no history of drug use.        Review of Systems  Denies HA, CP, Shortness of air, muscle weakness,  and rashes      Objective     Vital Signs Range for the last 24 hours  Temperature: Temp:  [97.9 °F (36.6 °C)] 97.9 °F (36.6 °C)   Temp Source: Temp src: Oral   BP: BP: ()/(54-74) 89/57   Pulse: Heart Rate:  [74-79] 77   Respirations: Resp:  [20] 20   SPO2: SpO2:  [99 %-100 %] 100 %   O2 Amount (l/min):     O2 Devices     Weight: Weight:  [92.5 kg (204 lb)] 92.5 kg (204 lb)     Physical Examination: General appearance - alert, but appeared very uncomfortable with abdominal pain.   Chest - Breathing is unlaboured   Heart - regular rate  Abdomen - soft, nondistended, Significant tenderness to  the touch at the lower abdomen/uterus.    bowel sounds normal  Extremities - pedal edema   - none    Presentation: Cephalic    Cervix: Exam by: Method: sterile exam per physician   Dilation:  Closed   Effacement:  thick   Station:  High      Fetal Heart Rate Assessment   Method: Fetal HR Assessment Method: intermittent auscultation, using Doppler   Beats/min: Fetal HR (beats/min): 155   Baseline:     Varibility:     Accels:     Decels:     Tracing Category:       Uterine Assessment   Method: Method: external tocotransducer   Frequency (min):     Ctx Count in 10 min:     Duration:     Intensity: Contraction Intensity: mild by palpation   Intensity by IUPC:     Resting Tone: Uterine Resting Tone: soft by palpation             Laboratory Results:   Lab Results   Component Value Date     08/06/2024    HGB 11.4 (L) 08/06/2024    HCT 34.2 08/06/2024    WBC 13.90 (H) 08/06/2024      Lab Results   Component Value Date     08/06/2024    K 3.7 08/06/2024     08/06/2024    CO2 20.0 (L) 08/06/2024    BUN 4 (L) 08/06/2024    CREATININE 0.51 (L) 08/06/2024    GLUCOSE 89 08/06/2024    ALBUMIN 3.6 08/06/2024    CALCIUM 8.7 08/06/2024    AST 19 08/06/2024    ALT 8 08/06/2024    BILITOT 0.3 08/06/2024      Lactate:   1.8    Fibrinogen 469   Lab Results   Component Value Date    SQUAMEPIUA 21-30 (A) 08/06/2024    SPECGRAVUR 1.020 08/06/2024    KETONESU Trace (A) 08/06/2024    BLOODU Negative 08/06/2024    LEUKOCYTESUR Negative 08/06/2024    NITRITEU Negative 08/06/2024    RBCUA 3-5 (A) 08/06/2024    WBCUA 0-2 08/06/2024    BACTERIA None Seen 08/06/2024            Assessment:  1. Intrauterine pregnancy at 21w6d weeks gestation  2.  Acute abdominal pain  3.  Previous C/S at 28 weeks due to lethal fetal anomaly       Plan:  1 IV fluids and pain medications   2.Labs done - no ominous findings  3. PDC scan pending       Raul Cali MD  8/6/2024  07:10 EDT      ADDENDUM     PDC scan reassuring (see Viewpoint)   Labs  reviewed   [place] with irregular contractions   Patient continues to report intermittent lower abdominal pain. Of note, no bowel movement x 2 days. On exam, diffuse abdominal pain (R>L), no guarding or rebound.   Plan admit for continued IV hydration, indocin, bowel regimen and continued observation.   Possible CT-A/P if no resolution/worsens.   Questions solicited and answered.

## 2024-08-07 PROBLEM — O99.340 BIPOLAR DISEASE IN PREGNANCY: Status: ACTIVE | Noted: 2024-08-07

## 2024-08-07 PROBLEM — F31.9 BIPOLAR DISEASE IN PREGNANCY: Status: ACTIVE | Noted: 2024-08-07

## 2024-08-07 LAB
ANION GAP SERPL CALCULATED.3IONS-SCNC: 16 MMOL/L (ref 5–15)
BACTERIA UR QL AUTO: ABNORMAL /HPF
BASOPHILS # BLD AUTO: 0.03 10*3/MM3 (ref 0–0.2)
BASOPHILS NFR BLD AUTO: 0.2 % (ref 0–1.5)
BILE AC SERPL-SCNC: 3 UMOL/L (ref 0–10)
BILIRUB UR QL STRIP: ABNORMAL
BUN SERPL-MCNC: 5 MG/DL (ref 6–20)
BUN/CREAT SERPL: 9.4 (ref 7–25)
CALCIUM SPEC-SCNC: 8.2 MG/DL (ref 8.6–10.5)
CHLORIDE SERPL-SCNC: 102 MMOL/L (ref 98–107)
CLARITY UR: ABNORMAL
CO2 SERPL-SCNC: 17 MMOL/L (ref 22–29)
COLOR UR: ABNORMAL
CREAT SERPL-MCNC: 0.53 MG/DL (ref 0.57–1)
DEPRECATED RDW RBC AUTO: 43 FL (ref 37–54)
EGFRCR SERPLBLD CKD-EPI 2021: 125.4 ML/MIN/1.73
EOSINOPHIL # BLD AUTO: 0.06 10*3/MM3 (ref 0–0.4)
EOSINOPHIL NFR BLD AUTO: 0.5 % (ref 0.3–6.2)
ERYTHROCYTE [DISTWIDTH] IN BLOOD BY AUTOMATED COUNT: 13.2 % (ref 12.3–15.4)
FIBRINOGEN PPP-MCNC: 401 MG/DL (ref 203–470)
GLUCOSE SERPL-MCNC: 171 MG/DL (ref 65–99)
GLUCOSE UR STRIP-MCNC: ABNORMAL MG/DL
HCT VFR BLD AUTO: 26.4 % (ref 34–46.6)
HGB BLD-MCNC: 8.8 G/DL (ref 12–15.9)
HGB UR QL STRIP.AUTO: NEGATIVE
HYALINE CASTS UR QL AUTO: ABNORMAL /LPF
IMM GRANULOCYTES # BLD AUTO: 0.11 10*3/MM3 (ref 0–0.05)
IMM GRANULOCYTES NFR BLD AUTO: 0.9 % (ref 0–0.5)
INR PPP: 1.1 (ref 0.89–1.12)
KETONES UR QL STRIP: ABNORMAL
LEUKOCYTE ESTERASE UR QL STRIP.AUTO: NEGATIVE
LYMPHOCYTES # BLD AUTO: 1.58 10*3/MM3 (ref 0.7–3.1)
LYMPHOCYTES NFR BLD AUTO: 12.3 % (ref 19.6–45.3)
MAGNESIUM SERPL-MCNC: 2.1 MG/DL (ref 1.6–2.6)
MCH RBC QN AUTO: 29.5 PG (ref 26.6–33)
MCHC RBC AUTO-ENTMCNC: 33.3 G/DL (ref 31.5–35.7)
MCV RBC AUTO: 88.6 FL (ref 79–97)
MONOCYTES # BLD AUTO: 0.55 10*3/MM3 (ref 0.1–0.9)
MONOCYTES NFR BLD AUTO: 4.3 % (ref 5–12)
MUCOUS THREADS URNS QL MICRO: ABNORMAL /HPF
NEUTROPHILS NFR BLD AUTO: 10.53 10*3/MM3 (ref 1.7–7)
NEUTROPHILS NFR BLD AUTO: 81.8 % (ref 42.7–76)
NITRITE UR QL STRIP: NEGATIVE
NRBC BLD AUTO-RTO: 0.2 /100 WBC (ref 0–0.2)
PH UR STRIP.AUTO: 5.5 [PH] (ref 5–8)
PHOSPHATE SERPL-MCNC: 3.5 MG/DL (ref 2.5–4.5)
PLATELET # BLD AUTO: 305 10*3/MM3 (ref 140–450)
PMV BLD AUTO: 10 FL (ref 6–12)
POTASSIUM SERPL-SCNC: 3.6 MMOL/L (ref 3.5–5.2)
PROT UR QL STRIP: ABNORMAL
PROTHROMBIN TIME: 14.3 SECONDS (ref 12.2–14.5)
RBC # BLD AUTO: 2.98 10*6/MM3 (ref 3.77–5.28)
RBC # UR STRIP: ABNORMAL /HPF
REF LAB TEST METHOD: ABNORMAL
SODIUM SERPL-SCNC: 135 MMOL/L (ref 136–145)
SP GR UR STRIP: 1.05 (ref 1–1.03)
SQUAMOUS #/AREA URNS HPF: ABNORMAL /HPF
UROBILINOGEN UR QL STRIP: ABNORMAL
WBC # UR STRIP: ABNORMAL /HPF
WBC NRBC COR # BLD AUTO: 12.86 10*3/MM3 (ref 3.4–10.8)

## 2024-08-07 PROCEDURE — 85610 PROTHROMBIN TIME: CPT | Performed by: STUDENT IN AN ORGANIZED HEALTH CARE EDUCATION/TRAINING PROGRAM

## 2024-08-07 PROCEDURE — 84100 ASSAY OF PHOSPHORUS: CPT | Performed by: OBSTETRICS & GYNECOLOGY

## 2024-08-07 PROCEDURE — 25810000003 DEXTROSE 5 % AND SODIUM CHLORIDE 0.9 % 5-0.9 % SOLUTION: Performed by: OBSTETRICS & GYNECOLOGY

## 2024-08-07 PROCEDURE — 25810000003 LACTATED RINGERS PER 1000 ML: Performed by: OBSTETRICS & GYNECOLOGY

## 2024-08-07 PROCEDURE — 25010000002 DIPHENHYDRAMINE PER 50 MG

## 2024-08-07 PROCEDURE — 82239 BILE ACIDS TOTAL: CPT | Performed by: OBSTETRICS & GYNECOLOGY

## 2024-08-07 PROCEDURE — 85384 FIBRINOGEN ACTIVITY: CPT | Performed by: STUDENT IN AN ORGANIZED HEALTH CARE EDUCATION/TRAINING PROGRAM

## 2024-08-07 PROCEDURE — 81001 URINALYSIS AUTO W/SCOPE: CPT | Performed by: OBSTETRICS & GYNECOLOGY

## 2024-08-07 PROCEDURE — 86901 BLOOD TYPING SEROLOGIC RH(D): CPT

## 2024-08-07 PROCEDURE — 85025 COMPLETE CBC W/AUTO DIFF WBC: CPT | Performed by: STUDENT IN AN ORGANIZED HEALTH CARE EDUCATION/TRAINING PROGRAM

## 2024-08-07 PROCEDURE — 25010000002 HYDROMORPHONE 1 MG/ML SOLUTION: Performed by: OBSTETRICS & GYNECOLOGY

## 2024-08-07 PROCEDURE — 83735 ASSAY OF MAGNESIUM: CPT | Performed by: OBSTETRICS & GYNECOLOGY

## 2024-08-07 PROCEDURE — 25010000002 METOCLOPRAMIDE PER 10 MG: Performed by: OBSTETRICS & GYNECOLOGY

## 2024-08-07 PROCEDURE — 99232 SBSQ HOSP IP/OBS MODERATE 35: CPT | Performed by: OBSTETRICS & GYNECOLOGY

## 2024-08-07 PROCEDURE — 86900 BLOOD TYPING SEROLOGIC ABO: CPT

## 2024-08-07 PROCEDURE — 80048 BASIC METABOLIC PNL TOTAL CA: CPT | Performed by: OBSTETRICS & GYNECOLOGY

## 2024-08-07 PROCEDURE — 63710000001 DIPHENHYDRAMINE PER 50 MG: Performed by: OBSTETRICS & GYNECOLOGY

## 2024-08-07 PROCEDURE — 25010000002 THIAMINE PER 100 MG: Performed by: OBSTETRICS & GYNECOLOGY

## 2024-08-07 PROCEDURE — 87086 URINE CULTURE/COLONY COUNT: CPT | Performed by: OBSTETRICS & GYNECOLOGY

## 2024-08-07 RX ORDER — NITROFURANTOIN 25; 75 MG/1; MG/1
100 CAPSULE ORAL EVERY 12 HOURS SCHEDULED
Status: DISCONTINUED | OUTPATIENT
Start: 2024-08-07 | End: 2024-08-07

## 2024-08-07 RX ORDER — SODIUM PHOSPHATE,MONO-DIBASIC 19G-7G/118
1 ENEMA (ML) RECTAL ONCE
Status: COMPLETED | OUTPATIENT
Start: 2024-08-07 | End: 2024-08-07

## 2024-08-07 RX ORDER — POLYETHYLENE GLYCOL 3350 17 G/17G
17 POWDER, FOR SOLUTION ORAL DAILY
Status: DISCONTINUED | OUTPATIENT
Start: 2024-08-07 | End: 2024-08-09

## 2024-08-07 RX ORDER — DOCUSATE SODIUM 100 MG/1
100 CAPSULE, LIQUID FILLED ORAL 2 TIMES DAILY
Status: DISCONTINUED | OUTPATIENT
Start: 2024-08-07 | End: 2024-08-10 | Stop reason: HOSPADM

## 2024-08-07 RX ORDER — BISACODYL 10 MG
10 SUPPOSITORY, RECTAL RECTAL DAILY
Status: DISCONTINUED | OUTPATIENT
Start: 2024-08-07 | End: 2024-08-10 | Stop reason: HOSPADM

## 2024-08-07 RX ORDER — AMOXICILLIN 250 MG
2 CAPSULE ORAL ONCE
Status: COMPLETED | OUTPATIENT
Start: 2024-08-07 | End: 2024-08-07

## 2024-08-07 RX ORDER — DIPHENHYDRAMINE HYDROCHLORIDE 50 MG/ML
25 INJECTION INTRAMUSCULAR; INTRAVENOUS ONCE
Status: COMPLETED | OUTPATIENT
Start: 2024-08-07 | End: 2024-08-07

## 2024-08-07 RX ORDER — SIMETHICONE 80 MG
80 TABLET,CHEWABLE ORAL 4 TIMES DAILY PRN
Status: DISCONTINUED | OUTPATIENT
Start: 2024-08-07 | End: 2024-08-10 | Stop reason: HOSPADM

## 2024-08-07 RX ORDER — DIPHENHYDRAMINE HYDROCHLORIDE 50 MG/ML
INJECTION INTRAMUSCULAR; INTRAVENOUS
Status: COMPLETED
Start: 2024-08-07 | End: 2024-08-07

## 2024-08-07 RX ORDER — PANTOPRAZOLE SODIUM 40 MG/10ML
40 INJECTION, POWDER, LYOPHILIZED, FOR SOLUTION INTRAVENOUS DAILY
Status: DISCONTINUED | OUTPATIENT
Start: 2024-08-07 | End: 2024-08-10 | Stop reason: HOSPADM

## 2024-08-07 RX ORDER — LORAZEPAM 1 MG/1
0.5 TABLET ORAL ONCE
Status: COMPLETED | OUTPATIENT
Start: 2024-08-07 | End: 2024-08-07

## 2024-08-07 RX ORDER — SODIUM CHLORIDE, SODIUM LACTATE, POTASSIUM CHLORIDE, CALCIUM CHLORIDE 600; 310; 30; 20 MG/100ML; MG/100ML; MG/100ML; MG/100ML
125 INJECTION, SOLUTION INTRAVENOUS CONTINUOUS
Status: DISCONTINUED | OUTPATIENT
Start: 2024-08-07 | End: 2024-08-09

## 2024-08-07 RX ORDER — METOCLOPRAMIDE HYDROCHLORIDE 5 MG/ML
10 INJECTION INTRAMUSCULAR; INTRAVENOUS EVERY 6 HOURS
Status: DISCONTINUED | OUTPATIENT
Start: 2024-08-07 | End: 2024-08-08

## 2024-08-07 RX ADMIN — FOLIC ACID 125 ML/HR: 5 INJECTION, SOLUTION INTRAMUSCULAR; INTRAVENOUS; SUBCUTANEOUS at 09:09

## 2024-08-07 RX ADMIN — ACETAMINOPHEN 650 MG: 325 TABLET ORAL at 17:55

## 2024-08-07 RX ADMIN — INDOMETHACIN 50 MG: 25 CAPSULE ORAL at 21:00

## 2024-08-07 RX ADMIN — INDOMETHACIN 50 MG: 25 CAPSULE ORAL at 12:38

## 2024-08-07 RX ADMIN — HYDROMORPHONE HYDROCHLORIDE 1 MG: 1 INJECTION, SOLUTION INTRAMUSCULAR; INTRAVENOUS; SUBCUTANEOUS at 05:47

## 2024-08-07 RX ADMIN — INDOMETHACIN 50 MG: 25 CAPSULE ORAL at 05:43

## 2024-08-07 RX ADMIN — SIMETHICONE 80 MG: 80 TABLET, CHEWABLE ORAL at 10:35

## 2024-08-07 RX ADMIN — DOCUSATE SODIUM 100 MG: 100 CAPSULE, LIQUID FILLED ORAL at 12:38

## 2024-08-07 RX ADMIN — HYDROMORPHONE HYDROCHLORIDE 1 MG: 1 INJECTION, SOLUTION INTRAMUSCULAR; INTRAVENOUS; SUBCUTANEOUS at 07:49

## 2024-08-07 RX ADMIN — SODIUM CHLORIDE, POTASSIUM CHLORIDE, SODIUM LACTATE AND CALCIUM CHLORIDE 125 ML/HR: 600; 310; 30; 20 INJECTION, SOLUTION INTRAVENOUS at 20:57

## 2024-08-07 RX ADMIN — INDOMETHACIN 50 MG: 25 CAPSULE ORAL at 00:59

## 2024-08-07 RX ADMIN — METOCLOPRAMIDE 10 MG: 5 INJECTION, SOLUTION INTRAMUSCULAR; INTRAVENOUS at 16:57

## 2024-08-07 RX ADMIN — Medication 10 ML: at 20:57

## 2024-08-07 RX ADMIN — DOCUSATE SODIUM 100 MG: 100 CAPSULE, LIQUID FILLED ORAL at 21:00

## 2024-08-07 RX ADMIN — INDOMETHACIN 50 MG: 25 CAPSULE ORAL at 16:57

## 2024-08-07 RX ADMIN — DIPHENHYDRAMINE HYDROCHLORIDE 50 MG: 50 CAPSULE ORAL at 03:49

## 2024-08-07 RX ADMIN — ACETAMINOPHEN 650 MG: 325 TABLET ORAL at 12:37

## 2024-08-07 RX ADMIN — LORAZEPAM 0.5 MG: 1 TABLET ORAL at 06:03

## 2024-08-07 RX ADMIN — POLYETHYLENE GLYCOL 3350 17 G: 17 POWDER, FOR SOLUTION ORAL at 17:55

## 2024-08-07 RX ADMIN — SODIUM PHOSPHATE 1 ENEMA: 7; 19 ENEMA RECTAL at 01:00

## 2024-08-07 RX ADMIN — MAGNESIUM HYDROXIDE 30 ML: 400 SUSPENSION ORAL at 10:52

## 2024-08-07 RX ADMIN — INDOMETHACIN 50 MG: 25 CAPSULE ORAL at 09:09

## 2024-08-07 RX ADMIN — DIPHENHYDRAMINE HYDROCHLORIDE 25 MG: 50 INJECTION INTRAMUSCULAR; INTRAVENOUS at 06:04

## 2024-08-07 RX ADMIN — SODIUM SULFATE, POTASSIUM SULFATE, MAGNESIUM SULFATE 1 BOTTLE: 1.6; 3.13; 17.5 SOLUTION ORAL at 10:51

## 2024-08-07 RX ADMIN — HYDROMORPHONE HYDROCHLORIDE 1 MG: 1 INJECTION, SOLUTION INTRAMUSCULAR; INTRAVENOUS; SUBCUTANEOUS at 02:09

## 2024-08-07 RX ADMIN — DEXTROSE AND SODIUM CHLORIDE 125 ML/HR: 5; 900 INJECTION, SOLUTION INTRAVENOUS at 04:57

## 2024-08-07 RX ADMIN — DIPHENHYDRAMINE HYDROCHLORIDE 50 MG: 50 CAPSULE ORAL at 21:59

## 2024-08-07 RX ADMIN — ACETAMINOPHEN 650 MG: 325 TABLET ORAL at 22:44

## 2024-08-07 RX ADMIN — BISACODYL 10 MG: 10 SUPPOSITORY RECTAL at 12:31

## 2024-08-07 RX ADMIN — METOCLOPRAMIDE 10 MG: 5 INJECTION, SOLUTION INTRAMUSCULAR; INTRAVENOUS at 22:00

## 2024-08-07 RX ADMIN — PANTOPRAZOLE SODIUM 40 MG: 40 INJECTION, POWDER, FOR SOLUTION INTRAVENOUS at 10:35

## 2024-08-07 RX ADMIN — SENNOSIDES AND DOCUSATE SODIUM 2 TABLET: 50; 8.6 TABLET ORAL at 00:59

## 2024-08-07 RX ADMIN — METOCLOPRAMIDE 10 MG: 5 INJECTION, SOLUTION INTRAMUSCULAR; INTRAVENOUS at 10:38

## 2024-08-07 NOTE — PROGRESS NOTES
Daily Progress Note    Patient name: Riana Anthony  YOB: 1990   MRN: [id number]  Referring Provider: Grey Stroud  Admission Date: 2024  Date of Service: 2024    Riana Anthony is a 34 y.o.    at 22w0d  admitted on 2024 for Abdominal pain affecting pregnancy    C/o severe pain still.   in room.  She was given dilaudid earlier this AM.  States no flatus, some burping/GERD.  Ate breakfast - two pancakes, sausage, 2 apple juices, coffee.  No emesis but c/o nausea.    Hospital day 1    Diagnoses:     Abdominal pain affecting pregnancy    Supraumbilical hernia    Status post bariatric surgery    Ventral hernia    History of classical  section    Nausea/vomiting in pregnancy    Bipolar disease in pregnancy       Prenatal Labs  Lab Results   Component Value Date    HGB 8.8 (L) 2024    HEPBSAG Negative 2024    ABORH O POSITIVE 2022    ABO O 2024    RH Positive 2024    ABSCRN Negative 2024    RAJ9OLP1 Non Reactive 2024    HEPCVIRUSABY Non Reactive 2024    URINECX Final report 2024       Subjective:      Riana has no complaints today.  Reports fetal movement is normal  No contractions  Denies leakage of amniotic fluid or vaginal discharge.  Denies vaginal bleeding    States she has had severe nausea and vomiting for entire pregnancy and uses phenergan and zofran to control.  Notes itching for majority of her pregnancy.  C/o pain     Review of Systems - all other systems reviewed and negative.  Specifically denies urgency, frequency, dysuria.    Objective:     Vital signs:  Vital Signs Range for the last 24 hours  Temperature: Temp:  [97.9 °F (36.6 °C)-98.4 °F (36.9 °C)] 98 °F (36.7 °C)   Temp Source: Temp src: Oral   BP: BP: ()/(50-63) 100/54   Pulse: Heart Rate:  [72-91] 91   Respirations: Resp:  [16-18] 16   Weight: 92.5 kg (204 lb)     General: Alert & oriented x4, in no apparent distress  HEENT:  Grossly normal  Resp: Unlabored breathing  Abdomen: distended, diffusely tender, no rebound.  Left CVA tenderness > right  Midline incisions and abdominoplasty incisions noted.  Patient states pain is worst in a semi [place] over this scar and under abdomen in right and left lower quadrants.  Uterus: Gravid, nontender  Extremities: Nontender, no pain, no edema   Neurologic:  Alert & oriented x 4,  no focal deficits noted  Psychiatric:  Speech and behavior appropriate     Non-Stress Test:  Fetal Heart Rate Assessment   Method: Fetal HR Assessment Method: intermittent auscultation, using Doppler   Beats/min: Fetal HR (beats/min): 140   Baseline: Fetal HR Baseline: normal range   Variability:     Accels:     Decels:     Tracing Category:       Uterine Assessment   Method: Method: external tocotransducer   Frequency (min): Contraction Frequency (Minutes): 10   Ctx Count in 10 min:     Duration:     Intensity: Contraction Intensity: no contractions   Intensity by IUPC:     Resting Tone: Uterine Resting Tone: soft by palpation   Resting Tone by IUPC:     Lou Units:       Cervix: Exam by: Method: sterile exam per physician   Dilation:     Effacement:     Station:         Most recent ultrasound:  PDC - see report  Normal ovaries bilaterally    Medications:  bisacodyl, 10 mg, Rectal, Daily  docusate sodium, 100 mg, Oral, BID  indomethacin, 50 mg, Oral, Q4H  lamoTRIgine, 100 mg, Oral, Daily  magnesium hydroxide, 10 mL, Oral, Daily  metoclopramide, 10 mg, Intravenous, Q6H  pantoprazole, 40 mg, Intravenous, Daily  sertraline, 25 mg, Oral, Daily  sodium chloride, 10 mL, Intravenous, Q12H  thiamine (B-1) 100 mg, folic acid 1 mg in dextrose 5 % and sodium chloride 0.45 % 1,000 mL infusion, 125 mL/hr, Intravenous, Daily         acetaminophen    diphenhydrAMINE    famotidine    lidocaine PF 1%    magnesium hydroxide    simethicone    sodium chloride    sodium chloride    Labs:  Lab Results   Component Value Date    WBC 12.86  (H) 2024    HGB 8.8 (L) 2024    HCT 26.4 (L) 2024    MCV 88.6 2024     2024    GLU 80 2022    CREATININE 0.53 (L) 2024    URICACID 4.4 2022    AST 16 2024    ALT 8 2024     Results from last 7 days   Lab Units 24  0516   ABO TYPING  O   RH TYPING  Positive   ANTIBODY SCREEN  Negative       CT Abdomen Pelvis With & Without Contrast    Result Date: 2024  Normal appendix. Small volume abdominal ascites. No acute pathology is seen. The visualized large and small bowel appear normal. Electronically Signed: Lev Willett MD  2024 8:57 PM EDT  Workstation ID: [id number]       Assessment/Plan:      24   Riana is a 34 y.o.    at 22w0d with Abdominal pain affecting pregnancy     Patient Active Problem List   Diagnosis    Gastroesophageal reflux disease    Supraumbilical hernia    Herpetic vulvovaginitis    Status post bariatric surgery    Anemia    Ventral hernia    Psychosis    Depression    History of classical  section    Nausea/vomiting in pregnancy    Family history of congenital anomaly    Mild hyperemesis gravidarum    Abdominal pain affecting pregnancy    Bipolar disease in pregnancy         Only thing abnormal so far is large stool volume in colon.      Will resend urinalysis  Bile acids ordered  Recheck electrolytes  Very anemic but this is stable and I do not want to start iron until not constipated.    Will work on bowel regimen and see if pain much better with BM.        Arlene Campbell MD  2024 17:21 EDT

## 2024-08-07 NOTE — PROGRESS NOTES
To bedside for interval assessment.    Request evaluation by physician due to her discomfort.  Upon my entry to the room she is crying, per report from RN was asleep about 20 minutes prior.  She reports feeling significant abdominal pain, lower back pain, and bilateral shoulder pain.  She is upset by generalized body itching.  She is frustrated by her inability to sleep.  She does see a psychiatrist primarily for bipolar disorder, reporting previous episodes of mae in the past.  She is currently on Lamictal, which is ordered as an inpatient.  She has not had benzodiazepines in the past.    She was unable to tolerate the low-volume colonoscopy prep earlier.  She received some of her bowel prep after this, but requested stopping the therapy so that she could sleep.    Vital signs stable  Repeat CBC consistent with previous, coags normal      -Recommend considering bile acid assessment to rule out cholestasis of pregnancy with next lab draw  -Continue p.o. Benadryl to help with itching  -IV Benadryl and p.o. Ativan to help sleep      Jennifer Baker MD  08/07/24  06:00 EDT    [facility]

## 2024-08-07 NOTE — PAYOR COMM NOTE
"Riana Anthony (34 y.o. Female)     From:  Rosa Sheppard LPN, Utilization Review  Phone #[phone]  Fax #[phone]    [employer] Medicaid Ref#[id number]    MEDICAL INPATIENT ADMISSION.              Date of Birth   1990    Social Security Number       Address   [address] [place] [address]    Home Phone   [phone]    MRN   [id number]       Orthodoxy   Zoroastrian    Marital Status                               Admission Date   8/6/24    Admission Type   Elective    Admitting Provider   Rena Pinzon MD    Attending Provider   Grey Stroud MD    Department, Room/Bed   [facility] ANTEPARTUM, N332/1       Discharge Date       Discharge Disposition       Discharge Destination                                 Attending Provider: Grey Stroud MD    Allergies: Pork-derived Products    Isolation: None   Infection: None   Code Status: CPR    Ht: 167.6 cm (66\")   Wt: 92.5 kg (204 lb)    Admission Cmt: None   Principal Problem: Abdominal pain affecting pregnancy [O26.899,R10.9]                   Active Insurance as of 8/6/2024       Primary Coverage       Payor Plan Insurance Group Employer/Plan Group    [place] [id number]       Payor Plan Address Payor Plan Phone Number Payor Plan Fax Number Effective Dates    [employer] [address] [address] [phone]  1/1/2020 - None Entered    [place] [address]         Subscriber Name Subscriber Birth Date Member ID       RIANA ANTHONY 1990 [id number]                     Emergency Contacts        (Rel.) Home Phone Work Phone Mobile Phone    Stephen Anthony (Spouse) [phone] -- [phone]              Insurance Information                  [place] Phone: [phone]    Subscriber: Riana Anthony Subscriber#: [id number]    Group#: [id number] Precert#: --             History & Physical        Rena Pinzon MD at 08/06/24 " "0710          [facility]  Obstetric History and Physical    Chief Complaint   Patient presents with    Contractions       HPI:      Patient is a 34 y.o. female  currently at 21w6d, who presents with acute lower abdominal pain and lower back pain that woke her up around 0200 hrs.  She tried getting in the shower and using the toilet, but nothing helped and it only got worse.   She reports some nausea from the pain, but no vomiting, fever or chills.  She denies vaginal leaking and bleeding.   She denies trauma.   Denies recent intercourse.   She denies urinary symptoms.         The following portions of the patients history were reviewed and updated as appropriate: current medications, allergies, past medical history, past surgical history, past family history, past social history and problem list .       Prenatal Information:   Maternal Prenatal Labs  Blood Type ABO Type   Date Value Ref Range Status   2024 O  Final      Rh Status RH type   Date Value Ref Range Status   2024 Positive  Final      Antibody Screen Antibody Screen   Date Value Ref Range Status   2024 Negative  Final      Gonnorhea No results found for: \"GCCX\"   Chlamydia No results found for: \"CLAMYDCU\"   RPR No results found for: \"RPR\"   Syphilis Antibody No results found for: \"SYPHILIS\"   Rubella No results found for: \"RUBELLAIGGIN\"   Hepatitis B Surface Antigen No results found for: \"HEPBSAG\"   HIV-1 Antibody No results found for: \"LABHIV1\"   Hepatitis C Antibody No results found for: \"HEPCAB\"   Rapid Urin Drug Screen No results found for: \"AMPMETHU\", \"BARBITSCNUR\", \"LABBENZSCN\", \"LABMETHSCN\", \"LABOPIASCN\", \"THCURSCR\", \"COCAINEUR\", \"AMPHETSCREEN\", \"PROPOXSCN\", \"BUPRENORSCNU\", \"METAMPSCNUR\", \"OXYCODONESCN\", \"TRICYCLICSCN\"   Group B Strep Culture No results found for: \"GBSANTIGEN\"           External Prenatal Results       Pregnancy Outside Results - Transcribed From Office Records - See Scanned Records For Details       Test " Value Date Time    ABO  O  24 0516    Rh  Positive  24 0516    Antibody Screen  Negative  24 0516       Negative  24 1450    Varicella IgG       Rubella  3.78 index 24 1450    Hgb  11.4 g/dL 24 0516       10.8 g/dL 24 1556       11.4 g/dL 24 1342       13.3 g/dL 24 1450       14.0 g/dL 24 1555    Hct  34.2 % 24 0516       32.4 % 24 1556       35.4 % 24 1342       41.0 % 24 1450       42.1 % 24 1555    HgB A1c        1h GTT       3h GTT Fasting       3h GTT 1 hour       3h GTT 2 hour       3h GTT 3 hour        Gonorrhea (discrete)  Negative  24 1450    Chlamydia (discrete)  Negative  24 1450    RPR  Non Reactive  24 1450    Syphils cascade: TP-Ab (FTA)       TP-Ab       TP-Ab (EIA)       TPPA       HBsAg  Negative  24 1450    Herpes Simplex Virus PCR       Herpes Simplex VIrus Culture       HIV  Non Reactive  24 1450    Hep C RNA Quant PCR       Hep C Antibody  Non Reactive  24 1450    AFP  27.6 ng/mL 24 0938    NIPT       Cystic Fibroisis        Group B Strep       GBS Susceptibility to Clindamycin       GBS Susceptibility to Erythromycin       Fetal Fibronectin       Genetic Testing, Maternal Blood                 Drug Screening       Test Value Date Time    Urine Drug Screen       Amphetamine Screen  Negative ng/mL 24 1450    Barbiturate Screen  Negative ng/mL 24 1450    Benzodiazepine Screen  Negative ng/mL 24 1450    Methadone Screen  Negative ng/mL 24 1450    Phencyclidine Screen  Negative ng/mL 24 1450    Opiates Screen       THC Screen       Cocaine Screen       Propoxyphene Screen  Negative ng/mL 24 1450    Buprenorphine Screen       Methamphetamine Screen       Oxycodone Screen       Tricyclic Antidepressants Screen                 Legend    ^: Historical                              Past OB History:     OB History    Para Term   AB Living   6 4 3 1 1 3   SAB IAB Ectopic Molar Multiple Live Births   1 0 0 0 0 4      # Outcome Date GA Lbr Alen/2nd Weight Sex Type Anes PTL Lv   6 Current            5  22 28w0d   F CS-Unspec Gen, Combined spi N ND      Name: STEPHANIE NAVA      Apgar1: 0  Apgar5: 0   4 SAB 2021 10w0d             Birth Comments: no D&C   3 Term 07/27/15 39w0d  3118 g (6 lb 14 oz) M Vag-Spont  Y ALICE      Birth Comments: NATHANIEL from dehydration at 20 weeks; Elective induction      Complications: Status post induction of labor   2 Term 12 39w0d  3062 g (6 lb 12 oz) M Vag-Spont  N ALICE      Birth Comments: Elective induction      Complications: Status post induction of labor   1 Term 11 39w0d  4026 g (8 lb 14 oz) M Vag-Spont  N ALICE      Birth Comments: induced for LGA      Complications: Status post induction of labor      Obstetric Comments   FOB #1 : Pregnancy # 1-6       Past Medical History: Past Medical History:   Diagnosis Date    Anemia     Back pain     Bipolar disorder     Cholelithiasis     Depression     Fetal sacrococcygeal teratoma affecting antepartum care of mother 2022    GERD (gastroesophageal reflux disease)     on BID PPI    History of transfusion     PATIENT DENIES REACTION    HL (hearing loss)     both ears, wears hearing aides    HSV-2 infection     last outbreak around     Hyperemesis gravidarum     pt states she always throws up every time she eats. She states she only drinks Gatorade.    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  Follows with psychiatry, on Prozac, previously on Zyprexa.    Recurrent pregnancy loss, antepartum condition or complication       Past Surgical History Past Surgical History:   Procedure Laterality Date    ABDOMINOPLASTY N/A 2022    Procedure: ABDOMINOPLASTY WITH LIPOSUCTION;  Surgeon: Frederick Betts MD;  [facility];   Service: Plastics;  Laterality: N/A;    BREAST AUGMENTATION Bilateral 2022    Procedure: BREAST AUGMENTATION BILATERAL WITH SILICONE IMPLANTS;  Surgeon: Frederick Betts MD;  Location:  [facility] OR;  Service: Plastics;  Laterality: Bilateral;     SECTION PRIMARY  2022    CHOLECYSTECTOMY N/A 2021    Procedure: CHOLECYSTECTOMY LAPAROSCOPIC;  Surgeon: Amrita Roche MD;  Location:  [facility] OR;  Service: General;  Laterality: N/A;    GASTRIC SLEEVE LAPAROSCOPIC  20.  38 [address]ougie.  op note rviewed    HERNIA REPAIR      VENTRAL/INCISIONAL HERNIA REPAIR N/A 2022    Procedure: VENTRAL HERNIA REPAIR OPEN;  Surgeon: Segundo Morales MD;  Location:  [facility] OR;  Service: General;  Laterality: N/A;      Family History: Family History   Problem Relation Age of Onset    No Known Problems Mother     No Known Problems Father     No Known Problems Sister     No Known Problems Brother     No Known Problems Maternal Grandmother     No Known Problems Maternal Grandfather     No Known Problems Paternal Grandmother     No Known Problems Paternal Grandfather       Social History:  reports that she has never smoked. She has never used smokeless tobacco.   reports no history of alcohol use.   reports no history of drug use.        Review of Systems  Denies HA, CP, Shortness of air, muscle weakness,  and rashes      Objective    Vital Signs Range for the last 24 hours  Temperature: Temp:  [97.9 °F (36.6 °C)] 97.9 °F (36.6 °C)   Temp Source: Temp src: Oral   BP: BP: ()/(54-74) 89/57   Pulse: Heart Rate:  [74-79] 77   Respirations: Resp:  [20] 20   SPO2: SpO2:  [99 %-100 %] 100 %   O2 Amount (l/min):     O2 Devices     Weight: Weight:  [92.5 kg (204 lb)] 92.5 kg (204 lb)     Physical Examination: General appearance - alert, but appeared very uncomfortable with abdominal pain.   Chest - Breathing is unlaboured   Heart - regular rate  Abdomen - soft, nondistended, Significant  tenderness to the touch at the lower abdomen/uterus.    bowel sounds normal  Extremities - pedal edema   - none    Presentation: Cephalic    Cervix: Exam by: Method: sterile exam per physician   Dilation:  Closed   Effacement:  thick   Station:  High      Fetal Heart Rate Assessment   Method: Fetal HR Assessment Method: intermittent auscultation, using Doppler   Beats/min: Fetal HR (beats/min): 155   Baseline:     Varibility:     Accels:     Decels:     Tracing Category:       Uterine Assessment   Method: Method: external tocotransducer   Frequency (min):     Ctx Count in 10 min:     Duration:     Intensity: Contraction Intensity: mild by palpation   Intensity by IUPC:     Resting Tone: Uterine Resting Tone: soft by palpation             Laboratory Results:   Lab Results   Component Value Date     08/06/2024    HGB 11.4 (L) 08/06/2024    HCT 34.2 08/06/2024    WBC 13.90 (H) 08/06/2024      Lab Results   Component Value Date     08/06/2024    K 3.7 08/06/2024     08/06/2024    CO2 20.0 (L) 08/06/2024    BUN 4 (L) 08/06/2024    CREATININE 0.51 (L) 08/06/2024    GLUCOSE 89 08/06/2024    ALBUMIN 3.6 08/06/2024    CALCIUM 8.7 08/06/2024    AST 19 08/06/2024    ALT 8 08/06/2024    BILITOT 0.3 08/06/2024      Lactate:   1.8    Fibrinogen 469   Lab Results   Component Value Date    SQUAMEPIUA 21-30 (A) 08/06/2024    SPECGRAVUR 1.020 08/06/2024    KETONESU Trace (A) 08/06/2024    BLOODU Negative 08/06/2024    LEUKOCYTESUR Negative 08/06/2024    NITRITEU Negative 08/06/2024    RBCUA 3-5 (A) 08/06/2024    WBCUA 0-2 08/06/2024    BACTERIA None Seen 08/06/2024            Assessment:  1. Intrauterine pregnancy at 21w6d weeks gestation  2.  Acute abdominal pain  3.  Previous C/S at 28 weeks due to lethal fetal anomaly       Plan:  1 IV fluids and pain medications   2.Labs done - no ominous findings  3. PDC scan pending       Raul Cali MD  8/6/2024  07:10 EDT      ADDENDUM     PDC scan reassuring (see  Viewpoint)   Labs reviewed   [place] with irregular contractions   Patient continues to report intermittent lower abdominal pain. Of note, no bowel movement x 2 days. On exam, diffuse abdominal pain (R>L), no guarding or rebound.   Plan admit for continued IV hydration, indocin, bowel regimen and continued observation.   Possible CT-A/P if no resolution/worsens.   Questions solicited and answered.       Electronically signed by Rena Pinzon MD at 08/06/24 0852       Vital Signs (last 2 days)       Date/Time Temp Temp src Pulse Resp BP Patient Position SpO2    08/07/24 1235 98 (36.7) -- 91 16 100/54 -- --    Comment rows:    OBSERV: patient states that she has had a small bit of a bowel movement at 08/07/24 1235    08/07/24 0750 97.9 (36.6) Oral 81 16 99/63 Lying --    08/07/24 0550 -- -- -- -- -- -- --    Comment rows:    OBSERV: Per Samuel, pt to not be disturbed unless she needs anything to get some sleep. at 08/07/24 0550    08/07/24 0422 98.4 (36.9) Oral 75 16 103/54 Sitting --    08/07/24 0205 -- -- -- -- -- -- --    Comment rows:    OBSERV: Pt states she was unable to have a bowel movement with enema. Pt wishes to get some sleep after 0200 labs. at 08/07/24 0205    08/07/24 0100 -- -- -- -- -- -- --    Comment rows:    OBSERV: Pt educated on fleet enema instruction by RN. Pt asked for privacy to self administer. Pt verbalized understanding. Pt to call out if pt needs any assistance but will give pt privacy. at 08/07/24 0100    08/07/24 0033 97.9 (36.6) Oral 72 18 94/50 Lying --    08/06/24 2051 -- -- 73 -- 94/53 Sitting --    08/06/24 1900 97.9 (36.6) Oral 73 16 -- -- --    08/06/24 1840 -- -- -- -- -- -- --    Comment rows:    OBSERV: dr cat at bedside with ultrasound at 08/06/24 1840    08/06/24 1607 97.8 (36.6) Oral 64 16 94/50 Sitting --    08/06/24 1236 97.6 (36.4) Oral 65 16 103/52 Lying --    08/06/24 1007 -- -- -- -- -- -- --    Comment rows:    OBSERV: to APU at 08/06/24 1007     08/06/24 0845 -- -- -- -- 96/63 Sitting --    08/06/24 0734 -- -- -- -- -- -- --    Comment rows:    OBSERV: to PDC at 08/06/24 0734    08/06/24 0600 -- -- -- -- 89/57 -- --    08/06/24 0545 -- -- -- -- 100/74 -- --    08/06/24 0535 -- -- 77 -- -- -- 100    08/06/24 0534 -- -- 79 -- -- -- 99    08/06/24 0533 -- -- 74 -- -- -- 100    08/06/24 0530 -- -- 75 -- 87/54 -- 99    08/06/24 0529 97.9 (36.6) Oral -- 20 87/57 Sitting --    08/06/24 0500 -- -- -- -- -- -- --    Comment rows:    OBSERV: dr estrada, bedside ultrasound at 08/06/24 0500          Facility-Administered Medications as of 8/7/2024   Medication Dose Route Frequency Provider Last Rate Last Admin    acetaminophen (TYLENOL) tablet 650 mg  650 mg Oral Q4H PRN Rena Pinzon MD   650 mg at 08/07/24 1237    [COMPLETED] bisacodyl (DULCOLAX) suppository 10 mg  10 mg Rectal Once Rena Pinzon MD   10 mg at 08/06/24 1237    bisacodyl (DULCOLAX) suppository 10 mg  10 mg Rectal Daily Arlene Campbell MD   10 mg at 08/07/24 1231    dextrose 5 % and sodium chloride 0.9 % infusion  125 mL/hr Intravenous Continuous Rena Pinzon MD   Stopped at 08/07/24 0550    diphenhydrAMINE (BENADRYL) capsule 50 mg  50 mg Oral Q6H PRN Rena Pinzon MD   50 mg at 08/07/24 0349    [COMPLETED] diphenhydrAMINE (BENADRYL) injection 25 mg  25 mg Intravenous Once Jennifer Baker MD   25 mg at 08/07/24 0604    docusate sodium (COLACE) capsule 100 mg  100 mg Oral BID Arlene Campbell MD   100 mg at 08/07/24 1238    famotidine (PEPCID) tablet 20 mg  20 mg Oral BID PRN Jennifer Baker MD        [COMPLETED] Fleets enema 1 enema  1 enema Rectal Once Jennifer Baker MD   1 enema at 08/07/24 0100    [COMPLETED] HYDROmorphone (DILAUDID) injection 0.5 mg  0.5 mg Intravenous Once Raul Cali MD   0.5 mg at 08/06/24 0806    [COMPLETED] indomethacin (INDOCIN) capsule 100 mg  100 mg Oral Once Rena Pinzon MD   100 mg at  08/06/24 0938    Followed by    indomethacin (INDOCIN) capsule 50 mg  50 mg Oral Q4H Rena Pinzon MD   50 mg at 08/07/24 1238    [COMPLETED] iopamidol (ISOVUE-300) 61 % injection 80 mL  80 mL Intravenous Once in imaging Forester, Grey Joe MD   80 mL at 08/06/24 2045    [COMPLETED] lactated ringers bolus 1,000 mL  1,000 mL Intravenous Once Raul Cali MD   Stopped at 08/06/24 0700    [COMPLETED] lactated ringers bolus 1,000 mL  1,000 mL Intravenous Once Raul Cali MD   Stopped at 08/06/24 0807    lamoTRIgine (LaMICtal) tablet 100 mg  100 mg Oral Daily Jennifer Baker MD   100 mg at 08/06/24 2321    lidocaine PF 1% (XYLOCAINE) injection 0.5 mL  0.5 mL Intradermal Once PRN Rena Pinzon MD        [COMPLETED] LORazepam (ATIVAN) tablet 0.5 mg  0.5 mg Oral Once Jennifer Baker MD   0.5 mg at 08/07/24 0603    magnesium hydroxide (MILK OF MAGNESIA) 400 MG/5ML suspension 10 mL  10 mL Oral Daily Arlene Campbell MD        magnesium hydroxide (MILK OF MAGNESIA) 400 MG/5ML suspension 30 mL  30 mL Oral Daily PRN Jennifer Baker MD   30 mL at 08/07/24 1052    metoclopramide (REGLAN) injection 10 mg  10 mg Intravenous Q6H Arlene Campbell MD   10 mg at 08/07/24 1038    pantoprazole (PROTONIX) injection 40 mg  40 mg Intravenous Daily Arlene Campbell MD   40 mg at 08/07/24 1035    [COMPLETED] sennosides-docusate (PERICOLACE) 8.6-50 MG per tablet 2 tablet  2 tablet Oral Once Raul Cali MD   2 tablet at 08/06/24 0655    [COMPLETED] sennosides-docusate (PERICOLACE) 8.6-50 MG per tablet 2 tablet  2 tablet Oral Once Jennifer Baker MD   2 tablet at 08/07/24 0059    sertraline (ZOLOFT) tablet 25 mg  25 mg Oral Daily Jennifer Baker MD   25 mg at 08/06/24 2321    simethicone (MYLICON) chewable tablet 80 mg  80 mg Oral 4x Daily PRN Arlene Campbell MD   80 mg at 08/07/24 1035    sodium chloride 0.9 % flush 10 mL  10 mL Intravenous Q12H  Rena Pinzon MD        sodium chloride 0.9 % flush 10 mL  10 mL Intravenous PRN Rena Pinzon MD        sodium chloride 0.9 % infusion 40 mL  40 mL Intravenous PRN Rena Pinzon MD        [COMPLETED] sodium-potassium-magnesium sulfates (SUPREP) oral solution 1 bottle  1 bottle Oral Q12H Jennifer Baker MD   1 bottle at 08/07/24 1051    thiamine (B-1) 100 mg, folic acid 1 mg in dextrose 5 % and sodium chloride 0.45 % 1,000 mL infusion  125 mL/hr Intravenous Daily Rena Pinzon  mL/hr at 08/07/24 0909 125 mL/hr at 08/07/24 0909     Lab Results (last 48 hours)       Procedure Component Value Units Date/Time    Bile Acids, Total [[id number]]  (Normal) Collected: 08/07/24 1024    Specimen: Blood Updated: 08/07/24 1440     Bile Acids Total 3 umol/L     Basic Metabolic Panel [[id number]]  (Abnormal) Collected: 08/07/24 1024    Specimen: Blood Updated: 08/07/24 1126     Glucose 171 mg/dL      BUN 5 mg/dL      Creatinine 0.53 mg/dL      Sodium 135 mmol/L      Potassium 3.6 mmol/L      Chloride 102 mmol/L      CO2 17.0 mmol/L      Calcium 8.2 mg/dL      BUN/Creatinine Ratio 9.4     Anion Gap 16.0 mmol/L      eGFR 125.4 mL/min/1.73     Narrative:      GFR Normal >60  Chronic Kidney Disease <60  Kidney Failure <15      Magnesium [[id number]]  (Normal) Collected: 08/07/24 1024    Specimen: Blood Updated: 08/07/24 1126     Magnesium 2.1 mg/dL     Phosphorus [[id number]]  (Normal) Collected: 08/07/24 1024    Specimen: Blood Updated: 08/07/24 1126     Phosphorus 3.5 mg/dL     Fibrinogen [[id number]]  (Normal) Collected: 08/07/24 0220    Specimen: Blood Updated: 08/07/24 0253     Fibrinogen 401 mg/dL     Protime-INR [[id number]]  (Normal) Collected: 08/07/24 0220    Specimen: Blood Updated: 08/07/24 0253     Protime 14.3 Seconds      INR 1.10    CBC & Differential [[id number]]  (Abnormal) Collected: 08/07/24 0220    Specimen: Blood Updated: 08/07/24 0240    Narrative:      The following orders  were created for panel order CBC & Differential.  Procedure                               Abnormality         Status                     ---------                               -----------         ------                     CBC Auto Differential[[id number]]        Abnormal            Final result                 Please view results for these tests on the individual orders.    CBC Auto Differential [[id number]]  (Abnormal) Collected: 08/07/24 0220    Specimen: Blood Updated: 08/07/24 0240     WBC 12.86 10*3/mm3      RBC 2.98 10*6/mm3      Hemoglobin 8.8 g/dL      Hematocrit 26.4 %      MCV 88.6 fL      MCH 29.5 pg      MCHC 33.3 g/dL      RDW 13.2 %      RDW-SD 43.0 fl      MPV 10.0 fL      Platelets 305 10*3/mm3      Neutrophil % 81.8 %      Lymphocyte % 12.3 %      Monocyte % 4.3 %      Eosinophil % 0.5 %      Basophil % 0.2 %      Immature Grans % 0.9 %      Neutrophils, Absolute 10.53 10*3/mm3      Lymphocytes, Absolute 1.58 10*3/mm3      Monocytes, Absolute 0.55 10*3/mm3      Eosinophils, Absolute 0.06 10*3/mm3      Basophils, Absolute 0.03 10*3/mm3      Immature Grans, Absolute 0.11 10*3/mm3      nRBC 0.2 /100 WBC     CBC & Differential [[id number]]  (Abnormal) Collected: 08/06/24 1906    Specimen: Blood Updated: 08/06/24 2018    Narrative:      The following orders were created for panel order CBC & Differential.  Procedure                               Abnormality         Status                     ---------                               -----------         ------                     CBC Auto Differential[[id number]]        Abnormal            Final result                 Please view results for these tests on the individual orders.    CBC Auto Differential [[id number]]  (Abnormal) Collected: 08/06/24 1906    Specimen: Blood Updated: 08/06/24 2018     WBC 11.40 10*3/mm3      RBC 3.06 10*6/mm3      Hemoglobin 8.9 g/dL      Hematocrit 26.9 %      MCV 87.9 fL      MCH 29.1 pg      MCHC 33.1 g/dL      RDW 13.2 %       RDW-SD 42.5 fl      MPV 9.9 fL      Platelets 302 10*3/mm3      Neutrophil % 78.4 %      Lymphocyte % 16.1 %      Monocyte % 4.2 %      Eosinophil % 0.5 %      Basophil % 0.2 %      Immature Grans % 0.6 %      Neutrophils, Absolute 8.93 10*3/mm3      Lymphocytes, Absolute 1.84 10*3/mm3      Monocytes, Absolute 0.48 10*3/mm3      Eosinophils, Absolute 0.06 10*3/mm3      Basophils, Absolute 0.02 10*3/mm3      Immature Grans, Absolute 0.07 10*3/mm3      nRBC 0.0 /100 WBC     Fibrinogen [[id number]]  (Normal) Collected: 08/06/24 1906    Specimen: Blood Updated: 08/06/24 1946     Fibrinogen 390 mg/dL     Comprehensive Metabolic Panel [[id number]]  (Abnormal) Collected: 08/06/24 1906    Specimen: Blood Updated: 08/06/24 1942     Glucose 107 mg/dL      BUN 4 mg/dL      Creatinine 0.46 mg/dL      Sodium 135 mmol/L      Potassium 4.1 mmol/L      Chloride 104 mmol/L      CO2 21.0 mmol/L      Calcium 8.3 mg/dL      Total Protein 5.4 g/dL      Albumin 3.1 g/dL      ALT (SGPT) 8 U/L      AST (SGOT) 16 U/L      Alkaline Phosphatase 80 U/L      Total Bilirubin 0.3 mg/dL      Globulin 2.3 gm/dL      Comment: Calculated Result        A/G Ratio 1.3 g/dL      BUN/Creatinine Ratio 8.7     Anion Gap 10.0 mmol/L      eGFR 129.8 mL/min/1.73     Narrative:      GFR Normal >60  Chronic Kidney Disease <60  Kidney Failure <15      Lipase [[id number]]  (Abnormal) Collected: 08/06/24 1906    Specimen: Blood Updated: 08/06/24 1942     Lipase 10 U/L     Lactic Acid, Plasma [[id number]]  (Normal) Collected: 08/06/24 1906    Specimen: Blood Updated: 08/06/24 1939     Lactate 1.6 mmol/L      Comment: Falsely depressed results may occur on samples drawn from patients receiving N-Acetylcysteine (NAC) or Metamizole.       Urinalysis With Microscopic If Indicated (No Culture) - Urine, Clean Catch [[id number]]  (Abnormal) Collected: 08/06/24 0614    Specimen: Urine, Clean Catch Updated: 08/06/24 0702     Color, UA Yellow     Appearance, UA Clear     pH, UA  7.0     Specific Gravity, UA 1.020     Glucose, UA Negative     Ketones, UA Trace     Bilirubin, UA Negative     Blood, UA Negative     Protein, UA 30 mg/dL (1+)     Leuk Esterase, UA Negative     Nitrite, UA Negative     Urobilinogen, UA 1.0 E.U./dL    Urinalysis, Microscopic Only - Urine, Clean Catch [[id number]]  (Abnormal) Collected: 08/06/24 0614    Specimen: Urine, Clean Catch Updated: 08/06/24 0702     RBC, UA 3-5 /HPF      WBC, UA 0-2 /HPF      Bacteria, UA None Seen /HPF      Squamous Epithelial Cells, UA 21-30 /HPF      Renal Epithelial Cells, UA 0-2 /HPF      Hyaline Casts, UA 31-50 /LPF      Mucus, UA Small/1+ /HPF      Methodology Manual Light Microscopy    Comprehensive Metabolic Panel [[id number]]  (Abnormal) Collected: 08/06/24 0516    Specimen: Blood Updated: 08/06/24 0606     Glucose 89 mg/dL      BUN 4 mg/dL      Creatinine 0.51 mg/dL      Sodium 138 mmol/L      Potassium 3.7 mmol/L      Comment: Slight hemolysis detected by analyzer. Result may be falsely elevated.        Chloride 102 mmol/L      CO2 20.0 mmol/L      Calcium 8.7 mg/dL      Total Protein 6.5 g/dL      Albumin 3.6 g/dL      ALT (SGPT) 8 U/L      AST (SGOT) 19 U/L      Alkaline Phosphatase 81 U/L      Total Bilirubin 0.3 mg/dL      Globulin 2.9 gm/dL      Comment: Calculated Result        A/G Ratio 1.2 g/dL      BUN/Creatinine Ratio 7.8     Anion Gap 16.0 mmol/L      eGFR 126.6 mL/min/1.73     Narrative:      GFR Normal >60  Chronic Kidney Disease <60  Kidney Failure <15      Lactic Acid, Plasma [[id number]]  (Normal) Collected: 08/06/24 0516    Specimen: Blood Updated: 08/06/24 0605     Lactate 1.8 mmol/L      Comment: Falsely depressed results may occur on samples drawn from patients receiving N-Acetylcysteine (NAC) or Metamizole.       Fibrinogen [[id number]]  (Normal) Collected: 08/06/24 0516    Specimen: Blood Updated: 08/06/24 0601     Fibrinogen 469 mg/dL     CBC & Differential [[id number]]  (Abnormal) Collected: 08/06/24 0516     Specimen: Blood Updated: 24    Narrative:      The following orders were created for panel order CBC & Differential.  Procedure                               Abnormality         Status                     ---------                               -----------         ------                     CBC Auto Differential[[id number]]        Abnormal            Final result                 Please view results for these tests on the individual orders.    CBC Auto Differential [[id number]]  (Abnormal) Collected: 24    Specimen: Blood Updated: 24     WBC 13.90 10*3/mm3      RBC 3.91 10*6/mm3      Hemoglobin 11.4 g/dL      Hematocrit 34.2 %      MCV 87.5 fL      MCH 29.2 pg      MCHC 33.3 g/dL      RDW 13.2 %      RDW-SD 42.2 fl      MPV 10.1 fL      Platelets 368 10*3/mm3      Neutrophil % 65.3 %      Lymphocyte % 27.1 %      Monocyte % 4.9 %      Eosinophil % 1.8 %      Basophil % 0.4 %      Immature Grans % 0.5 %      Neutrophils, Absolute 9.08 10*3/mm3      Lymphocytes, Absolute 3.76 10*3/mm3      Monocytes, Absolute 0.68 10*3/mm3      Eosinophils, Absolute 0.25 10*3/mm3      Basophils, Absolute 0.06 10*3/mm3      Immature Grans, Absolute 0.07 10*3/mm3      nRBC 0.0 /100 WBC           Imaging Results (Last 48 Hours)       Procedure Component Value Units Date/Time    CT Abdomen Pelvis With & Without Contrast [[id number]] Collected: 24     Updated: 24    Narrative:      CT ABDOMEN PELVIS W WO CONTRAST    Date of Exam: 2024 8:05 PM EDT    Indication: Pregnant, abdominal pain, nausea; history of classical , abdominoplasty, gastric bypass..    Comparison: None available.    Technique: Axial CT images were obtained of the abdomen and pelvis before and after the uneventful intravenous administration of 80 cc Isovue-300 IV contrast . Sagittal and coronal reconstructions were performed.  Automated exposure control and iterative   reconstruction methods were  used.    Findings: Bilateral breast implants. Trace left pleural effusion.    Liver:No masses. No intrahepatic biliary ductal dilatation.    Spleen:No masses. No perisplenic hematoma.    Pancreas:No pancreatic masses. No evidence of pancreatitis.    Gallbladder and common bile duct: Prior cholecystectomy.    Adrenal glands:No adrenal masses    Kidneys and ureters:No kidney stones. No renal masses.No calculi present within the ureters. Normal caliber ureters.    Urinary bladder:No urinary bladder wall thickening. No bladder masses.    Small bowel: Postsurgical changes involving the stomach.    Large bowel:No diverticulosis or diverticulitis. No large bowel masses are appreciated    Appendix: Normal appendix    GENITOURINARY: Gravid uterus.    Ascites or pneumoperitoneum: Small volume abdominal ascites. No pneumoperitoneum.    Adenopathy:None present    Osseous structures:Normal    Other findings: None      Impression:      Normal appendix. Small volume abdominal ascites. No acute pathology is seen. The visualized large and small bowel appear normal.          Electronically Signed: Lev Willett MD    2024 8:57 PM EDT    Workstation ID: [id number]    [facility] [[id number]] Collected: 24     Updated: 24    Narrative:      PAT NAME: VERNON NAVA  [facility] REC#: [id number]  BIRTH DA: 1990  PAT GEND: F  ACCOUNT#: [id number]  PAT TYPE: O  EXAM MARIA ELENA: [id number]  REF PHYS CANDICE UNDERWOOD  ACCESSION [id number]      Comparison Studies  =================    The findings of this study are compared to the prior ultrasound study dated 24      Patient Status  ============    Inpatient      Indication  ========    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 Previous classical c/s x 1. Hx gastric bypass. Abdominal pain.      Maternal Assessment  ==================    Height 167 cm  Height (ft) 5 ft  Height (in) 6 in  Weight 93 kg  Weight (lb) 204  lb  BMI 33.18 kg/m²      Method  =======    Transabdominal ultrasound examination. View: Good view      Pregnancy  =========    Ortega pregnancy. Number of fetuses: 1      Dating  ======    LMP on: 3/6/2024  GA by LMP 21 w + 6 d  NED by LMP: 2024  Method of dating: based on stated NED  GA by prior assessment 21 w + 6 d  NED by prior assessment: 2024  Previous dating: based on the LMP, selected on 2024  Agreed NED of previous datin2024  Assigned: based on stated NED, selected on 2024  Assigned GA 21 w + 6 d  Assigned NED: 2024  Pregnancy length 280 d      General Evaluation  ================    Cardiac activity present.  bpm.  Fetal movements visualized.  Presentation cephalic.  Placenta Placental site: anterior.  Amniotic fluid Amount of AF: normal. MVP 6.9 cm.      Maternal Structures  ================    Uterus / Cervix  Cervix: Visualized  Approach: Transabdominal  Cervical length 49.7 mm  Ovaries / Tubes / Adnexa  Rt ovary: Visualized  Rt ovary D1 26.6 mm  Rt ovary D2 24.5 mm  Rt ovary D3 50.6 mm  Rt ovary Vol 17.3 cm³  Lt ovary: Visualized  Lt ovary D1 19.7 mm  Lt ovary D2 16.7 mm  Lt ovary D3 19.30 mm  Lt ovary Vol 3.3 cm³      Impression  =========    Single intrauterine pregnancy with cardiac activity.  No ultrasound etiology noted for pain.      Recommendation  ===============    Follow-up as clinically indicated.      Coding  ======    Description: [id number] Limited Ultrasound        Sonographer: RT GILBERT Sadler , [facility]  Physician: Doug Milligan, MD, FACOG    Electronically signed by: Doug Milligan, MD, FACOG at:  07:55          Orders (last 48 hrs)        Start     Ordered    24 1200  bisacodyl (DULCOLAX) suppository 10 mg  Daily         24 1059    24 1100  magnesium hydroxide (MILK OF MAGNESIA) 400 MG/5ML suspension 10 mL  Daily         24 1013    24 1100  metoclopramide (REGLAN) injection 10 mg  Every 6 Hours          08/07/24 1013    08/07/24 1100  pantoprazole (PROTONIX) injection 40 mg  Daily         08/07/24 1014    08/07/24 1100  docusate sodium (COLACE) capsule 100 mg  2 Times Daily         08/07/24 1014    08/07/24 1019  Urinalysis With Microscopic If Indicated (No Culture) - Urine, Clean Catch  Once         08/07/24 1018    08/07/24 1017  Basic Metabolic Panel  Once         08/07/24 1016    08/07/24 1017  Magnesium  Once         08/07/24 1016    08/07/24 1017  Phosphorus  Once         08/07/24 1016    08/07/24 1015  simethicone (MYLICON) chewable tablet 80 mg  4 Times Daily PRN         08/07/24 1015    08/07/24 0940  Bile Acids, Total  Once         08/07/24 0940    08/07/24 0900  docusate sodium (COLACE) capsule 100 mg  2 Times Daily,   Status:  Discontinued         08/06/24 2122 08/07/24 0645  diphenhydrAMINE (BENADRYL) injection 25 mg  Once         08/07/24 0558    08/07/24 0645  LORazepam (ATIVAN) tablet 0.5 mg  Once         08/07/24 0558    08/07/24 0400  CBC & Differential  Morning Draw,   Status:  Canceled         08/06/24 2120 08/07/24 0400  Fibrinogen  Morning Draw,   Status:  Canceled         08/06/24 2120 08/07/24 0400  Protime-INR  Morning Draw,   Status:  Canceled         08/06/24 2120 08/07/24 0400  CBC Auto Differential  PROCEDURE ONCE,   Status:  Canceled         08/06/24 2120    08/07/24 0200  CBC & Differential  Morning Draw         08/06/24 2151 08/07/24 0200  Fibrinogen  Morning Draw         08/06/24 2151 08/07/24 0200  Protime-INR  Morning Draw         08/06/24 2151 08/07/24 0200  CBC Auto Differential  PROCEDURE ONCE         08/06/24 2151 08/07/24 0115  sennosides-docusate (PERICOLACE) 8.6-50 MG per tablet 2 tablet  Once         08/07/24 0017    08/07/24 0115  Fleets enema 1 enema  Once         08/07/24 0017    08/07/24 0015  magnesium hydroxide (MILK OF MAGNESIA) 400 MG/5ML suspension 30 mL  Daily PRN         08/07/24 0017    08/06/24 8476  lamoTRIgine (LaMICtal) tablet 100 mg  " Daily         08/06/24 2118 08/06/24 2215  sertraline (ZOLOFT) tablet 25 mg  Daily         08/06/24 2118 08/06/24 2215  polyethylene glycol (MIRALAX) packet 17 g  2 Times Daily,   Status:  Discontinued         08/06/24 2122 08/06/24 2215  magnesium hydroxide (MILK OF MAGNESIA) suspension 10 mL  2 Times Daily,   Status:  Discontinued         08/06/24 2122 08/06/24 2207  Pt does not need to be continuous toco  Nursing Communication  Once        Comments: Pt does not need to be continuous toco    08/06/24 2207 08/06/24 2200  sodium-potassium-magnesium sulfates (SUPREP) oral solution 1 bottle  Every 12 Hours         08/06/24 2151 08/06/24 2145  iopamidol (ISOVUE-300) 61 % injection 80 mL  Once in Imaging         08/06/24 2045 08/06/24 2123  Tap Water Enema  Once         08/06/24 2122 08/06/24 2118  famotidine (PEPCID) tablet 20 mg  2 Times Daily PRN         08/06/24 2118 08/06/24 1845  Lipase  STAT         08/06/24 1844    08/06/24 1845  Fibrinogen  STAT         08/06/24 1844    08/06/24 1844  CBC & Differential  STAT         08/06/24 1844    08/06/24 1844  Lactic Acid, Plasma  STAT         08/06/24 1844    08/06/24 1844  Comprehensive Metabolic Panel  STAT         08/06/24 1844    08/06/24 1844  CBC Auto Differential  PROCEDURE ONCE         08/06/24 1844    08/06/24 1843  CT Abdomen Pelvis With & Without Contrast  1 Time Imaging         08/06/24 1843    08/06/24 1617  diphenhydrAMINE (BENADRYL) capsule 50 mg  Every 6 Hours PRN         08/06/24 1617    08/06/24 1258  indomethacin (INDOCIN) capsule 50 mg  Every 4 Hours        Placed in \"Followed by\" Linked Group    08/06/24 0859    08/06/24 1247  diphenhydrAMINE (BENADRYL) capsule 25 mg  Every 6 Hours PRN,   Status:  Discontinued         08/06/24 1247    08/06/24 0945  lactated ringers infusion  Continuous,   Status:  Discontinued         08/06/24 0849    08/06/24 0945  sodium chloride 0.9 % flush 10 mL  Every 12 Hours Scheduled         " "08/06/24 0859    08/06/24 0945  docusate sodium (COLACE) capsule 100 mg  2 Times Daily,   Status:  Discontinued         08/06/24 0859    08/06/24 0945  bisacodyl (DULCOLAX) suppository 10 mg  Once         08/06/24 0859    08/06/24 0945  thiamine (B-1) 100 mg, folic acid 1 mg in dextrose 5 % and sodium chloride 0.45 % 1,000 mL infusion  Daily         08/06/24 0859    08/06/24 0945  dextrose 5 % and sodium chloride 0.9 % infusion  Continuous         08/06/24 0859    08/06/24 0945  indomethacin (INDOCIN) capsule 100 mg  Once        Placed in \"Followed by\" Linked Group    08/06/24 0859    08/06/24 0900  HYDROmorphone (DILAUDID) injection 0.5 mg  Once         08/06/24 0801    08/06/24 0857  Up Ad Emily  Until Discontinued         08/06/24 0859    08/06/24 0857  External Uterine Contraction Monitoring only  Per Hospital Policy         08/06/24 0859    08/06/24 0857  Diet: Regular/House; Fluid Consistency: Thin (IDDSI 0)  Diet Effective Now         08/06/24 0859    08/06/24 0855  Admit To Obstetrics Inpatient  Once         08/06/24 0859    08/06/24 0855  Code Status and Medical Interventions: CPR (Attempt to Resuscitate); Full Support  Continuous         08/06/24 0859    08/06/24 0855  Place Sequential Compression Device  Once         08/06/24 0859    08/06/24 0855  Maintain Sequential Compression Device  Continuous         08/06/24 0859    08/06/24 0855  Vital Signs Per hospital policy  Per Hospital Policy         08/06/24 0859    08/06/24 0855  Antepartum Patients  <24 Weeks - Document Fetal Heart Tones Daily and PRN.  Per Order Details        Comments: For Antepartum Patients Less Than 24 Weeks - Document Fetal Heart Tones Daily & PRN.    08/06/24 0859    08/06/24 0855  Insert Peripheral IV  Once         08/06/24 0859    08/06/24 0855  Saline Lock & Maintain IV Access  Continuous         08/06/24 0859    08/06/24 0854  sodium chloride 0.9 % flush 10 mL  As Needed         08/06/24 0859    08/06/24 0854  sodium chloride 0.9 " "% infusion 40 mL  As Needed         24 0859    24 0854  lidocaine PF 1% (XYLOCAINE) injection 0.5 mL  Once As Needed         24 0859    24 0854  acetaminophen (TYLENOL) tablet 650 mg  Every 4 Hours PRN         24 0859    24 0854  ondansetron ODT (ZOFRAN-ODT) disintegrating tablet 8 mg  Every 8 Hours PRN,   Status:  Discontinued        Placed in \"Or\" Linked Group    24 0859    24 0854  ondansetron (ZOFRAN) injection 4 mg  Every 8 Hours PRN,   Status:  Discontinued        Placed in \"Or\" Linked Group    24 0859    24 0853  DIET MESSAGE Fruit cup please  Once        Comments: Fruit cup please    24 0852    24 0853  Diet: Regular/House; Fluid Consistency: Thin (IDDSI 0)  Diet Effective Now,   Status:  Canceled         24 0852    24 0730  sennosides-docusate (PERICOLACE) 8.6-50 MG per tablet 2 tablet  Once         24 0631    24 0702  [facility]  1 Time Imaging         24 0702    24 0652  Urinalysis, Microscopic Only - Urine, Clean Catch  Once         24 0651    24 0645  lactated ringers bolus 1,000 mL  Once         24 0549    24 0615  lactated ringers bolus 1,000 mL  Once         24 0520    24 0520  HYDROmorphone (DILAUDID) injection 1 mg  Every 2 Hours PRN,   Status:  Discontinued         24 0520    24 0456  Fibrinogen  STAT         24 0455    24 0453  CBC & Differential  STAT         24 0453    24 0453  Urinalysis With Microscopic If Indicated (No Culture) - Urine, Clean Catch  STAT         24 0453    24 0453  Lactic Acid, Plasma  STAT         24 0453    24 0453  CBC Auto Differential  PROCEDURE ONCE         24 0453    24 0451  Comprehensive Metabolic Panel  STAT         24 0450    24 0451  Type & Screen  STAT         24 0450    24 0450  CBC (No Diff)  STAT,   " Status:  Canceled         08/06/24 0450    --  promethazine (PHENERGAN) 12.5 MG tablet  Every 6 Hours PRN         08/06/24 0527                     Physician Progress Notes (last 48 hours)        Jennifer Baker MD at 08/07/24 0558          To bedside for interval assessment.    Request evaluation by physician due to her discomfort.  Upon my entry to the room she is crying, per report from RN was asleep about 20 minutes prior.  She reports feeling significant abdominal pain, lower back pain, and bilateral shoulder pain.  She is upset by generalized body itching.  She is frustrated by her inability to sleep.  She does see a psychiatrist primarily for bipolar disorder, reporting previous episodes of mae in the past.  She is currently on Lamictal, which is ordered as an inpatient.  She has not had benzodiazepines in the past.    She was unable to tolerate the low-volume colonoscopy prep earlier.  She received some of her bowel prep after this, but requested stopping the therapy so that she could sleep.    Vital signs stable  Repeat CBC consistent with previous, coags normal      -Recommend considering bile acid assessment to rule out cholestasis of pregnancy with next lab draw  -Continue p.o. Benadryl to help with itching  -IV Benadryl and p.o. Ativan to help sleep      Jennifer Baker MD  08/07/24  06:00 EDT    OB Laborist      Electronically signed by Jennifer Baker MD at 08/07/24 0600       Jennifer Baker MD at 08/06/24 2122          To bedside for interval assessment    VS normal, HR 74 specifically  PE: appears well, conversive, no acute distress, abdomen soft, voluntary guarding RLQ, no rebound tenderness  Labs notable for HCT 26.9 from 34.2 this AM  CT read per radiology notable only for small volume ascites; no uterine pathology noted; significant stool burden      - Close clinical monitoring overnight  - Repeat labs at 0200  - Aggressive bowel regimen       Jennifer Baker  MD  08/06/24  21:22 EDT    OB Laborist      Electronically signed by Jennifer Baker MD at 08/06/24 2150       Rena Pinzon MD at 08/06/24 1721            Patient reports feeling much improved   Having some generalized itching   Received suppository, no BM yet   S/p banana bag and IV bolus   Tolerating PO   No abd pain, vomiting, LOF, VB     NAD   Abd soft, NT    [place] none     Plan:   Continue indocin   Benadryl prn itching   Continue bowel regimen   Regular diet   Cont IV hydration  Monitor overnight   Anticipate d/c tomorrow AM      Electronically signed by Rena Pinzon MD at 08/06/24 1724       Consult Notes (last 48 hours)  Notes from 08/05/24 1519 through 08/07/24 1519   No notes of this type exist for this encounter.       FHR (last 2 days)        Date/Time Fetal HR Assessment Method Fetal HR (beats/min) Fetal HR Baseline Fetal HR Variability Fetal HR Accelerations Fetal HR Decelerations Fetal HR Tracing Category    08/07/24 1241 intermittent auscultation, using Doppler  140  normal range  --  --  --  --     08/06/24 0530 intermittent auscultation, using Doppler  155  --  --  --  --  --                   Uterine Activity (last 2 days)        Date/Time Method Contraction Frequency (Minutes) Contraction Duration (sec) Contraction Intensity Uterine Resting Tone Contraction Pattern    08/06/24 1920 --  --  --  --  soft by palpation  --     08/06/24 1005 external tocotransducer  10  40  --  --  --     08/06/24 0930 external tocotransducer  --  40  --  --  --     08/06/24 0900 external tocotransducer;per patient report  7  60  --  soft by palpation  --     08/06/24 0830 external tocotransducer  2-10  40  --  --  --     08/06/24 0800 external tocotransducer  --  --  no contractions  --  --     08/06/24 0730 external tocotransducer  --  40  --  --  --     08/06/24 0705 --  --  --  --  soft by palpation  --     08/06/24 0545 external tocotransducer  --  --  --  --  --     08/06/24 0530 external  tocotransducer  --  --  --  --  --     08/06/24 1879 external tocotransducer  --  --  --  --  --     08/06/24 8370 external tocotransducer  --  --  mild by palpation  soft by palpation  --

## 2024-08-07 NOTE — PROGRESS NOTES
To bedside for interval assessment    VS normal, HR 74 specifically  PE: appears well, conversive, no acute distress, abdomen soft, voluntary guarding RLQ, no rebound tenderness  Labs notable for HCT 26.9 from 34.2 this AM  CT read per radiology notable only for small volume ascites; no uterine pathology noted; significant stool burden      - Close clinical monitoring overnight  - Repeat labs at 0200  - Aggressive bowel regimen       Jennifer Baker MD  08/06/24  21:22 EDT    OB Laborist

## 2024-08-08 ENCOUNTER — APPOINTMENT (OUTPATIENT)
Dept: WOMENS IMAGING | Facility: HOSPITAL | Age: 34
End: 2024-08-08
Payer: MEDICAID

## 2024-08-08 ENCOUNTER — ANESTHESIA EVENT (OUTPATIENT)
Dept: LABOR AND DELIVERY | Facility: HOSPITAL | Age: 34
End: 2024-08-08
Payer: MEDICAID

## 2024-08-08 ENCOUNTER — ANESTHESIA EVENT CONVERTED (OUTPATIENT)
Dept: ANESTHESIOLOGY | Facility: HOSPITAL | Age: 34
End: 2024-08-08
Payer: MEDICAID

## 2024-08-08 ENCOUNTER — ANESTHESIA (OUTPATIENT)
Dept: ANESTHESIOLOGY | Facility: HOSPITAL | Age: 34
End: 2024-08-08
Payer: MEDICAID

## 2024-08-08 ENCOUNTER — ANESTHESIA (OUTPATIENT)
Dept: LABOR AND DELIVERY | Facility: HOSPITAL | Age: 34
End: 2024-08-08
Payer: MEDICAID

## 2024-08-08 ENCOUNTER — ANESTHESIA EVENT (OUTPATIENT)
Dept: ANESTHESIOLOGY | Facility: HOSPITAL | Age: 34
End: 2024-08-08
Payer: MEDICAID

## 2024-08-08 LAB
ABO GROUP BLD: NORMAL
ALBUMIN SERPL-MCNC: 2.5 G/DL (ref 3.5–5.2)
ALBUMIN SERPL-MCNC: 2.5 G/DL (ref 3.5–5.2)
ALBUMIN SERPL-MCNC: 2.7 G/DL (ref 3.5–5.2)
ALBUMIN/GLOB SERPL: 1.2 G/DL
ALBUMIN/GLOB SERPL: 1.4 G/DL
ALBUMIN/GLOB SERPL: 1.4 G/DL
ALP SERPL-CCNC: 84 U/L (ref 39–117)
ALP SERPL-CCNC: 85 U/L (ref 39–117)
ALP SERPL-CCNC: 93 U/L (ref 39–117)
ALT SERPL W P-5'-P-CCNC: 11 U/L (ref 1–33)
ALT SERPL W P-5'-P-CCNC: 11 U/L (ref 1–33)
ALT SERPL W P-5'-P-CCNC: 8 U/L (ref 1–33)
ANION GAP SERPL CALCULATED.3IONS-SCNC: 11 MMOL/L (ref 5–15)
ANION GAP SERPL CALCULATED.3IONS-SCNC: 4 MMOL/L (ref 5–15)
ANION GAP SERPL CALCULATED.3IONS-SCNC: 8 MMOL/L (ref 5–15)
ANION GAP SERPL CALCULATED.3IONS-SCNC: 8 MMOL/L (ref 5–15)
AST SERPL-CCNC: 22 U/L (ref 1–32)
AST SERPL-CCNC: 25 U/L (ref 1–32)
AST SERPL-CCNC: 25 U/L (ref 1–32)
BASOPHILS # BLD AUTO: 0.03 10*3/MM3 (ref 0–0.2)
BASOPHILS NFR BLD AUTO: 0.2 % (ref 0–1.5)
BILIRUB SERPL-MCNC: 0.3 MG/DL (ref 0–1.2)
BILIRUB SERPL-MCNC: 0.6 MG/DL (ref 0–1.2)
BILIRUB SERPL-MCNC: 0.6 MG/DL (ref 0–1.2)
BUN SERPL-MCNC: 6 MG/DL (ref 6–20)
BUN SERPL-MCNC: 7 MG/DL (ref 6–20)
BUN/CREAT SERPL: 13.2 (ref 7–25)
BUN/CREAT SERPL: 14.6 (ref 7–25)
BUN/CREAT SERPL: 15.2 (ref 7–25)
BUN/CREAT SERPL: 15.9 (ref 7–25)
CALCIUM SPEC-SCNC: 6.8 MG/DL (ref 8.6–10.5)
CALCIUM SPEC-SCNC: 6.9 MG/DL (ref 8.6–10.5)
CALCIUM SPEC-SCNC: 7.1 MG/DL (ref 8.6–10.5)
CALCIUM SPEC-SCNC: 7.6 MG/DL (ref 8.6–10.5)
CHLORIDE SERPL-SCNC: 105 MMOL/L (ref 98–107)
CHLORIDE SERPL-SCNC: 106 MMOL/L (ref 98–107)
CHLORIDE SERPL-SCNC: 108 MMOL/L (ref 98–107)
CHLORIDE SERPL-SCNC: 109 MMOL/L (ref 98–107)
CO2 SERPL-SCNC: 20 MMOL/L (ref 22–29)
CO2 SERPL-SCNC: 21 MMOL/L (ref 22–29)
CO2 SERPL-SCNC: 22 MMOL/L (ref 22–29)
CO2 SERPL-SCNC: 23 MMOL/L (ref 22–29)
CREAT SERPL-MCNC: 0.41 MG/DL (ref 0.57–1)
CREAT SERPL-MCNC: 0.44 MG/DL (ref 0.57–1)
CREAT SERPL-MCNC: 0.46 MG/DL (ref 0.57–1)
CREAT SERPL-MCNC: 0.53 MG/DL (ref 0.57–1)
DEPRECATED RDW RBC AUTO: 41.4 FL (ref 37–54)
DEPRECATED RDW RBC AUTO: 43.9 FL (ref 37–54)
DEPRECATED RDW RBC AUTO: 45 FL (ref 37–54)
EGFRCR SERPLBLD CKD-EPI 2021: 125.4 ML/MIN/1.73
EGFRCR SERPLBLD CKD-EPI 2021: 129.8 ML/MIN/1.73
EGFRCR SERPLBLD CKD-EPI 2021: 131.2 ML/MIN/1.73
EGFRCR SERPLBLD CKD-EPI 2021: 133.4 ML/MIN/1.73
EOSINOPHIL # BLD AUTO: 0.04 10*3/MM3 (ref 0–0.4)
EOSINOPHIL NFR BLD AUTO: 0.3 % (ref 0.3–6.2)
ERYTHROCYTE [DISTWIDTH] IN BLOOD BY AUTOMATED COUNT: 13.2 % (ref 12.3–15.4)
ERYTHROCYTE [DISTWIDTH] IN BLOOD BY AUTOMATED COUNT: 13.4 % (ref 12.3–15.4)
ERYTHROCYTE [DISTWIDTH] IN BLOOD BY AUTOMATED COUNT: 14.1 % (ref 12.3–15.4)
FIBRINOGEN PPP-MCNC: 266 MG/DL (ref 203–470)
FIBRINOGEN PPP-MCNC: 299 MG/DL (ref 203–470)
GLOBULIN UR ELPH-MCNC: 1.8 GM/DL
GLOBULIN UR ELPH-MCNC: 1.8 GM/DL
GLOBULIN UR ELPH-MCNC: 2.2 GM/DL
GLUCOSE SERPL-MCNC: 109 MG/DL (ref 65–99)
GLUCOSE SERPL-MCNC: 120 MG/DL (ref 65–99)
GLUCOSE SERPL-MCNC: 135 MG/DL (ref 65–99)
GLUCOSE SERPL-MCNC: 91 MG/DL (ref 65–99)
HCT VFR BLD AUTO: 16.9 % (ref 34–46.6)
HCT VFR BLD AUTO: 17.8 % (ref 34–46.6)
HCT VFR BLD AUTO: 22.5 % (ref 34–46.6)
HGB BLD-MCNC: 5.8 G/DL (ref 12–15.9)
HGB BLD-MCNC: 5.8 G/DL (ref 12–15.9)
HGB BLD-MCNC: 7.6 G/DL (ref 12–15.9)
IMM GRANULOCYTES # BLD AUTO: 0.1 10*3/MM3 (ref 0–0.05)
IMM GRANULOCYTES NFR BLD AUTO: 0.7 % (ref 0–0.5)
LIPASE SERPL-CCNC: 7 U/L (ref 13–60)
LYMPHOCYTES # BLD AUTO: 2.83 10*3/MM3 (ref 0.7–3.1)
LYMPHOCYTES NFR BLD AUTO: 19.6 % (ref 19.6–45.3)
MCH RBC QN AUTO: 29.6 PG (ref 26.6–33)
MCH RBC QN AUTO: 29.7 PG (ref 26.6–33)
MCH RBC QN AUTO: 30 PG (ref 26.6–33)
MCHC RBC AUTO-ENTMCNC: 32.6 G/DL (ref 31.5–35.7)
MCHC RBC AUTO-ENTMCNC: 33.8 G/DL (ref 31.5–35.7)
MCHC RBC AUTO-ENTMCNC: 34.3 G/DL (ref 31.5–35.7)
MCV RBC AUTO: 86.7 FL (ref 79–97)
MCV RBC AUTO: 88.9 FL (ref 79–97)
MCV RBC AUTO: 90.8 FL (ref 79–97)
MONOCYTES # BLD AUTO: 0.87 10*3/MM3 (ref 0.1–0.9)
MONOCYTES NFR BLD AUTO: 6 % (ref 5–12)
NEUTROPHILS NFR BLD AUTO: 10.55 10*3/MM3 (ref 1.7–7)
NEUTROPHILS NFR BLD AUTO: 73.2 % (ref 42.7–76)
NRBC BLD AUTO-RTO: 0 /100 WBC (ref 0–0.2)
PLATELET # BLD AUTO: 174 10*3/MM3 (ref 140–450)
PLATELET # BLD AUTO: 176 10*3/MM3 (ref 140–450)
PLATELET # BLD AUTO: 294 10*3/MM3 (ref 140–450)
PMV BLD AUTO: 10.1 FL (ref 6–12)
PMV BLD AUTO: 10.2 FL (ref 6–12)
PMV BLD AUTO: 10.4 FL (ref 6–12)
POTASSIUM SERPL-SCNC: 3.6 MMOL/L (ref 3.5–5.2)
POTASSIUM SERPL-SCNC: 3.7 MMOL/L (ref 3.5–5.2)
POTASSIUM SERPL-SCNC: 4.2 MMOL/L (ref 3.5–5.2)
POTASSIUM SERPL-SCNC: 4.3 MMOL/L (ref 3.5–5.2)
PROT SERPL-MCNC: 4.3 G/DL (ref 6–8.5)
PROT SERPL-MCNC: 4.3 G/DL (ref 6–8.5)
PROT SERPL-MCNC: 4.9 G/DL (ref 6–8.5)
RBC # BLD AUTO: 1.95 10*6/MM3 (ref 3.77–5.28)
RBC # BLD AUTO: 1.96 10*6/MM3 (ref 3.77–5.28)
RBC # BLD AUTO: 2.53 10*6/MM3 (ref 3.77–5.28)
RH BLD: POSITIVE
SODIUM SERPL-SCNC: 135 MMOL/L (ref 136–145)
SODIUM SERPL-SCNC: 135 MMOL/L (ref 136–145)
SODIUM SERPL-SCNC: 136 MMOL/L (ref 136–145)
SODIUM SERPL-SCNC: 139 MMOL/L (ref 136–145)
WBC NRBC COR # BLD AUTO: 11.79 10*3/MM3 (ref 3.4–10.8)
WBC NRBC COR # BLD AUTO: 12.84 10*3/MM3 (ref 3.4–10.8)
WBC NRBC COR # BLD AUTO: 14.42 10*3/MM3 (ref 3.4–10.8)

## 2024-08-08 PROCEDURE — 85384 FIBRINOGEN ACTIVITY: CPT | Performed by: OBSTETRICS & GYNECOLOGY

## 2024-08-08 PROCEDURE — 36430 TRANSFUSION BLD/BLD COMPNT: CPT

## 2024-08-08 PROCEDURE — P9059 PLASMA, FRZ BETWEEN 8-24HOUR: HCPCS

## 2024-08-08 PROCEDURE — 59515 CESAREAN DELIVERY: CPT | Performed by: OBSTETRICS & GYNECOLOGY

## 2024-08-08 PROCEDURE — 86900 BLOOD TYPING SEROLOGIC ABO: CPT

## 2024-08-08 PROCEDURE — 83690 ASSAY OF LIPASE: CPT | Performed by: OBSTETRICS & GYNECOLOGY

## 2024-08-08 PROCEDURE — 80053 COMPREHEN METABOLIC PANEL: CPT | Performed by: OBSTETRICS & GYNECOLOGY

## 2024-08-08 PROCEDURE — 25010000002 AMPICILLIN PER 500 MG: Performed by: OBSTETRICS & GYNECOLOGY

## 2024-08-08 PROCEDURE — 25010000002 SUCCINYLCHOLINE PER 20 MG: Performed by: NURSE ANESTHETIST, CERTIFIED REGISTERED

## 2024-08-08 PROCEDURE — 25810000003 SODIUM CHLORIDE 0.9 % SOLUTION: Performed by: NURSE ANESTHETIST, CERTIFIED REGISTERED

## 2024-08-08 PROCEDURE — 25010000002 HYDROMORPHONE 1 MG/ML SOLUTION: Performed by: OBSTETRICS & GYNECOLOGY

## 2024-08-08 PROCEDURE — 25810000003 LACTATED RINGERS PER 1000 ML: Performed by: NURSE ANESTHETIST, CERTIFIED REGISTERED

## 2024-08-08 PROCEDURE — 85025 COMPLETE CBC W/AUTO DIFF WBC: CPT | Performed by: OBSTETRICS & GYNECOLOGY

## 2024-08-08 PROCEDURE — 25010000002 METOCLOPRAMIDE PER 10 MG: Performed by: OBSTETRICS & GYNECOLOGY

## 2024-08-08 PROCEDURE — 25010000002 CLINDAMYCIN 900 MG/50ML SOLUTION: Performed by: OBSTETRICS & GYNECOLOGY

## 2024-08-08 PROCEDURE — 85027 COMPLETE CBC AUTOMATED: CPT | Performed by: OBSTETRICS & GYNECOLOGY

## 2024-08-08 PROCEDURE — 25010000002 FENTANYL CITRATE (PF) 50 MCG/ML SOLUTION: Performed by: NURSE ANESTHETIST, CERTIFIED REGISTERED

## 2024-08-08 PROCEDURE — 0UQ90ZZ REPAIR UTERUS, OPEN APPROACH: ICD-10-PCS | Performed by: OBSTETRICS & GYNECOLOGY

## 2024-08-08 PROCEDURE — 88305 TISSUE EXAM BY PATHOLOGIST: CPT | Performed by: OBSTETRICS & GYNECOLOGY

## 2024-08-08 PROCEDURE — 0UJD0ZZ INSPECTION OF UTERUS AND CERVIX, OPEN APPROACH: ICD-10-PCS | Performed by: OBSTETRICS & GYNECOLOGY

## 2024-08-08 PROCEDURE — 63710000001 DIPHENHYDRAMINE PER 50 MG: Performed by: OBSTETRICS & GYNECOLOGY

## 2024-08-08 PROCEDURE — 25010000002 HYDROMORPHONE PER 4 MG: Performed by: ANESTHESIOLOGY

## 2024-08-08 PROCEDURE — 25010000002 OXYTOCIN PER 10 UNITS: Performed by: NURSE ANESTHETIST, CERTIFIED REGISTERED

## 2024-08-08 PROCEDURE — 25010000002 PROPOFOL 10 MG/ML EMULSION: Performed by: NURSE ANESTHETIST, CERTIFIED REGISTERED

## 2024-08-08 PROCEDURE — 25010000002 MAGNESIUM SULFATE 20 GM/500ML SOLUTION: Performed by: OBSTETRICS & GYNECOLOGY

## 2024-08-08 PROCEDURE — P9016 RBC LEUKOCYTES REDUCED: HCPCS

## 2024-08-08 PROCEDURE — 25010000002 ONDANSETRON PER 1 MG: Performed by: NURSE ANESTHETIST, CERTIFIED REGISTERED

## 2024-08-08 PROCEDURE — 25010000002 GENTAMICIN PER 80 MG: Performed by: OBSTETRICS & GYNECOLOGY

## 2024-08-08 PROCEDURE — 25810000003 LACTATED RINGERS PER 1000 ML: Performed by: OBSTETRICS & GYNECOLOGY

## 2024-08-08 PROCEDURE — 86927 PLASMA FRESH FROZEN: CPT

## 2024-08-08 DEVICE — FLOSEAL HEMOSTATIC MATRIX, 10ML
Type: IMPLANTABLE DEVICE | Site: ABDOMEN | Status: FUNCTIONAL
Brand: FLOSEAL HEMOSTATIC MATRIX

## 2024-08-08 DEVICE — HEMOST ABS SURGICEL SNOW 4X4IN: Type: IMPLANTABLE DEVICE | Site: ABDOMEN | Status: FUNCTIONAL

## 2024-08-08 RX ORDER — ONDANSETRON 4 MG/1
4 TABLET, ORALLY DISINTEGRATING ORAL EVERY 8 HOURS PRN
Status: DISCONTINUED | OUTPATIENT
Start: 2024-08-08 | End: 2024-08-10 | Stop reason: HOSPADM

## 2024-08-08 RX ORDER — DOCUSATE SODIUM 100 MG/1
100 CAPSULE, LIQUID FILLED ORAL 2 TIMES DAILY PRN
Status: DISCONTINUED | OUTPATIENT
Start: 2024-08-08 | End: 2024-08-10 | Stop reason: HOSPADM

## 2024-08-08 RX ORDER — HYDROMORPHONE HYDROCHLORIDE 1 MG/ML
0.5 INJECTION, SOLUTION INTRAMUSCULAR; INTRAVENOUS; SUBCUTANEOUS
Status: DISCONTINUED | OUTPATIENT
Start: 2024-08-09 | End: 2024-08-10 | Stop reason: HOSPADM

## 2024-08-08 RX ORDER — MAGNESIUM SULFATE HEPTAHYDRATE 40 MG/ML
2 INJECTION, SOLUTION INTRAVENOUS CONTINUOUS
Status: DISCONTINUED | OUTPATIENT
Start: 2024-08-08 | End: 2024-08-08

## 2024-08-08 RX ORDER — NALOXONE HCL 0.4 MG/ML
0.1 VIAL (ML) INJECTION
Status: DISCONTINUED | OUTPATIENT
Start: 2024-08-08 | End: 2024-08-10 | Stop reason: HOSPADM

## 2024-08-08 RX ORDER — SODIUM CHLORIDE 0.9 % (FLUSH) 0.9 %
3 SYRINGE (ML) INJECTION EVERY 12 HOURS SCHEDULED
Status: DISCONTINUED | OUTPATIENT
Start: 2024-08-08 | End: 2024-08-10 | Stop reason: HOSPADM

## 2024-08-08 RX ORDER — PROMETHAZINE HYDROCHLORIDE 12.5 MG/1
12.5 SUPPOSITORY RECTAL EVERY 6 HOURS PRN
Status: DISCONTINUED | OUTPATIENT
Start: 2024-08-08 | End: 2024-08-10 | Stop reason: HOSPADM

## 2024-08-08 RX ORDER — PROMETHAZINE HYDROCHLORIDE 25 MG/1
25 TABLET ORAL EVERY 6 HOURS PRN
Status: DISCONTINUED | OUTPATIENT
Start: 2024-08-08 | End: 2024-08-10 | Stop reason: HOSPADM

## 2024-08-08 RX ORDER — OXYTOCIN 10 [USP'U]/ML
INJECTION, SOLUTION INTRAMUSCULAR; INTRAVENOUS AS NEEDED
Status: DISCONTINUED | OUTPATIENT
Start: 2024-08-08 | End: 2024-08-08 | Stop reason: SURG

## 2024-08-08 RX ORDER — DEXMEDETOMIDINE HYDROCHLORIDE 100 UG/ML
INJECTION, SOLUTION INTRAVENOUS AS NEEDED
Status: SHIPPED | OUTPATIENT
Start: 2024-08-08

## 2024-08-08 RX ORDER — OXYCODONE HYDROCHLORIDE 5 MG/1
5 TABLET ORAL EVERY 4 HOURS PRN
Status: DISCONTINUED | OUTPATIENT
Start: 2024-08-08 | End: 2024-08-08

## 2024-08-08 RX ORDER — SODIUM CHLORIDE 0.9 % (FLUSH) 0.9 %
3-10 SYRINGE (ML) INJECTION AS NEEDED
Status: DISCONTINUED | OUTPATIENT
Start: 2024-08-08 | End: 2024-08-10 | Stop reason: HOSPADM

## 2024-08-08 RX ORDER — BUPIVACAINE HYDROCHLORIDE 2.5 MG/ML
INJECTION, SOLUTION EPIDURAL; INFILTRATION; INTRACAUDAL
Status: COMPLETED | OUTPATIENT
Start: 2024-08-08 | End: 2024-08-08

## 2024-08-08 RX ORDER — OXYTOCIN/0.9 % SODIUM CHLORIDE 30/500 ML
PLASTIC BAG, INJECTION (ML) INTRAVENOUS AS NEEDED
Status: DISCONTINUED | OUTPATIENT
Start: 2024-08-08 | End: 2024-08-08 | Stop reason: SURG

## 2024-08-08 RX ORDER — ACETAMINOPHEN 325 MG/1
650 TABLET ORAL EVERY 6 HOURS
Status: DISCONTINUED | OUTPATIENT
Start: 2024-08-09 | End: 2024-08-10 | Stop reason: HOSPADM

## 2024-08-08 RX ORDER — SODIUM CHLORIDE 9 MG/ML
INJECTION, SOLUTION INTRAVENOUS CONTINUOUS PRN
Status: DISCONTINUED | OUTPATIENT
Start: 2024-08-08 | End: 2024-08-08 | Stop reason: SURG

## 2024-08-08 RX ORDER — SUCCINYLCHOLINE CHLORIDE 20 MG/ML
INJECTION INTRAMUSCULAR; INTRAVENOUS AS NEEDED
Status: DISCONTINUED | OUTPATIENT
Start: 2024-08-08 | End: 2024-08-08 | Stop reason: SURG

## 2024-08-08 RX ORDER — PRENATAL VIT/IRON FUM/FOLIC AC 27MG-0.8MG
1 TABLET ORAL DAILY
Status: DISCONTINUED | OUTPATIENT
Start: 2024-08-08 | End: 2024-08-10 | Stop reason: HOSPADM

## 2024-08-08 RX ORDER — DEXAMETHASONE SODIUM PHOSPHATE 10 MG/ML
INJECTION, SOLUTION INTRAMUSCULAR; INTRAVENOUS
Status: COMPLETED | OUTPATIENT
Start: 2024-08-08 | End: 2024-08-08

## 2024-08-08 RX ORDER — OXYTOCIN/0.9 % SODIUM CHLORIDE 30/500 ML
125 PLASTIC BAG, INJECTION (ML) INTRAVENOUS ONCE AS NEEDED
Status: DISCONTINUED | OUTPATIENT
Start: 2024-08-08 | End: 2024-08-10 | Stop reason: HOSPADM

## 2024-08-08 RX ORDER — ACETAMINOPHEN 500 MG
1000 TABLET ORAL EVERY 6 HOURS
Status: DISPENSED | OUTPATIENT
Start: 2024-08-08 | End: 2024-08-09

## 2024-08-08 RX ORDER — FENTANYL CITRATE 50 UG/ML
INJECTION, SOLUTION INTRAMUSCULAR; INTRAVENOUS AS NEEDED
Status: DISCONTINUED | OUTPATIENT
Start: 2024-08-08 | End: 2024-08-08 | Stop reason: SURG

## 2024-08-08 RX ORDER — SODIUM CHLORIDE 9 MG/ML
40 INJECTION, SOLUTION INTRAVENOUS AS NEEDED
Status: DISCONTINUED | OUTPATIENT
Start: 2024-08-08 | End: 2024-08-10 | Stop reason: HOSPADM

## 2024-08-08 RX ORDER — ALUMINA, MAGNESIA, AND SIMETHICONE 2400; 2400; 240 MG/30ML; MG/30ML; MG/30ML
15 SUSPENSION ORAL EVERY 4 HOURS PRN
Status: DISCONTINUED | OUTPATIENT
Start: 2024-08-08 | End: 2024-08-10 | Stop reason: HOSPADM

## 2024-08-08 RX ORDER — HYDROMORPHONE HYDROCHLORIDE 1 MG/ML
0.5 INJECTION, SOLUTION INTRAMUSCULAR; INTRAVENOUS; SUBCUTANEOUS
Status: DISPENSED | OUTPATIENT
Start: 2024-08-08 | End: 2024-08-08

## 2024-08-08 RX ORDER — OXYCODONE HYDROCHLORIDE 5 MG/1
10 TABLET ORAL EVERY 4 HOURS PRN
Status: DISCONTINUED | OUTPATIENT
Start: 2024-08-08 | End: 2024-08-08

## 2024-08-08 RX ORDER — HYDROCORTISONE 25 MG/G
1 CREAM TOPICAL AS NEEDED
Status: DISCONTINUED | OUTPATIENT
Start: 2024-08-08 | End: 2024-08-10 | Stop reason: HOSPADM

## 2024-08-08 RX ORDER — HYDROMORPHONE HCL/0.9% NACL/PF 10 MG/50ML
PATIENT CONTROLLED ANALGESIA SYRINGE INTRAVENOUS CONTINUOUS
Status: DISCONTINUED | OUTPATIENT
Start: 2024-08-08 | End: 2024-08-09

## 2024-08-08 RX ORDER — CALCIUM CARBONATE 500 MG/1
1 TABLET, CHEWABLE ORAL EVERY 4 HOURS PRN
Status: DISCONTINUED | OUTPATIENT
Start: 2024-08-08 | End: 2024-08-10 | Stop reason: HOSPADM

## 2024-08-08 RX ORDER — CLINDAMYCIN PHOSPHATE 900 MG/50ML
900 INJECTION, SOLUTION INTRAVENOUS EVERY 8 HOURS
Status: COMPLETED | OUTPATIENT
Start: 2024-08-08 | End: 2024-08-09

## 2024-08-08 RX ORDER — ONDANSETRON 2 MG/ML
INJECTION INTRAMUSCULAR; INTRAVENOUS AS NEEDED
Status: DISCONTINUED | OUTPATIENT
Start: 2024-08-08 | End: 2024-08-08 | Stop reason: SURG

## 2024-08-08 RX ORDER — PROPOFOL 10 MG/ML
VIAL (ML) INTRAVENOUS AS NEEDED
Status: DISCONTINUED | OUTPATIENT
Start: 2024-08-08 | End: 2024-08-08 | Stop reason: SURG

## 2024-08-08 RX ORDER — SODIUM CHLORIDE, SODIUM LACTATE, POTASSIUM CHLORIDE, CALCIUM CHLORIDE 600; 310; 30; 20 MG/100ML; MG/100ML; MG/100ML; MG/100ML
INJECTION, SOLUTION INTRAVENOUS CONTINUOUS PRN
Status: DISCONTINUED | OUTPATIENT
Start: 2024-08-08 | End: 2024-08-08 | Stop reason: SURG

## 2024-08-08 RX ADMIN — HYDROMORPHONE HYDROCHLORIDE 0.5 MG: 1 INJECTION, SOLUTION INTRAMUSCULAR; INTRAVENOUS; SUBCUTANEOUS at 08:48

## 2024-08-08 RX ADMIN — OXYTOCIN 10 UNITS: 10 INJECTION INTRAVENOUS at 07:04

## 2024-08-08 RX ADMIN — INDOMETHACIN 50 MG: 25 CAPSULE ORAL at 00:50

## 2024-08-08 RX ADMIN — SERTRALINE HYDROCHLORIDE 25 MG: 50 TABLET ORAL at 16:47

## 2024-08-08 RX ADMIN — AMPICILLIN SODIUM 2 G: 2 INJECTION, POWDER, FOR SOLUTION INTRAMUSCULAR; INTRAVENOUS at 10:12

## 2024-08-08 RX ADMIN — SODIUM CHLORIDE, POTASSIUM CHLORIDE, SODIUM LACTATE AND CALCIUM CHLORIDE: 600; 310; 30; 20 INJECTION, SOLUTION INTRAVENOUS at 07:40

## 2024-08-08 RX ADMIN — DEXMEDETOMIDINE HYDROCHLORIDE 50 MCG: 100 INJECTION, SOLUTION INTRAVENOUS at 10:42

## 2024-08-08 RX ADMIN — ONDANSETRON 4 MG: 2 INJECTION INTRAMUSCULAR; INTRAVENOUS at 07:41

## 2024-08-08 RX ADMIN — HYDROMORPHONE HYDROCHLORIDE 1 MG: 1 INJECTION, SOLUTION INTRAMUSCULAR; INTRAVENOUS; SUBCUTANEOUS at 04:50

## 2024-08-08 RX ADMIN — BUPIVACAINE HYDROCHLORIDE 60 ML: 2.5 INJECTION, SOLUTION EPIDURAL; INFILTRATION; INTRACAUDAL at 10:42

## 2024-08-08 RX ADMIN — INDOMETHACIN 50 MG: 25 CAPSULE ORAL at 05:08

## 2024-08-08 RX ADMIN — SIMETHICONE 80 MG: 80 TABLET, CHEWABLE ORAL at 00:50

## 2024-08-08 RX ADMIN — FENTANYL CITRATE 100 MCG: 50 INJECTION, SOLUTION INTRAMUSCULAR; INTRAVENOUS at 08:02

## 2024-08-08 RX ADMIN — SODIUM CHLORIDE, POTASSIUM CHLORIDE, SODIUM LACTATE AND CALCIUM CHLORIDE 125 ML/HR: 600; 310; 30; 20 INJECTION, SOLUTION INTRAVENOUS at 04:44

## 2024-08-08 RX ADMIN — AMPICILLIN SODIUM 2 G: 2 INJECTION, POWDER, FOR SOLUTION INTRAMUSCULAR; INTRAVENOUS at 21:39

## 2024-08-08 RX ADMIN — Medication 10 ML: at 21:09

## 2024-08-08 RX ADMIN — SODIUM CHLORIDE, POTASSIUM CHLORIDE, SODIUM LACTATE AND CALCIUM CHLORIDE: 600; 310; 30; 20 INJECTION, SOLUTION INTRAVENOUS at 06:54

## 2024-08-08 RX ADMIN — SUCCINYLCHOLINE CHLORIDE 120 MG: 20 INJECTION, SOLUTION INTRAMUSCULAR; INTRAVENOUS at 07:02

## 2024-08-08 RX ADMIN — ACETAMINOPHEN 1000 MG: 500 TABLET ORAL at 16:29

## 2024-08-08 RX ADMIN — AMPICILLIN SODIUM 2 G: 2 INJECTION, POWDER, FOR SOLUTION INTRAMUSCULAR; INTRAVENOUS at 16:28

## 2024-08-08 RX ADMIN — Medication 500 ML: at 07:44

## 2024-08-08 RX ADMIN — CLINDAMYCIN IN 5 PERCENT DEXTROSE 900 MG: 18 INJECTION, SOLUTION INTRAVENOUS at 18:53

## 2024-08-08 RX ADMIN — FENTANYL CITRATE 100 MCG: 50 INJECTION, SOLUTION INTRAMUSCULAR; INTRAVENOUS at 07:20

## 2024-08-08 RX ADMIN — FAMOTIDINE 20 MG: 20 TABLET, FILM COATED ORAL at 00:50

## 2024-08-08 RX ADMIN — SODIUM CHLORIDE: 9 INJECTION, SOLUTION INTRAVENOUS at 07:15

## 2024-08-08 RX ADMIN — LAMOTRIGINE 100 MG: 100 TABLET ORAL at 17:09

## 2024-08-08 RX ADMIN — DOCUSATE SODIUM 100 MG: 100 CAPSULE, LIQUID FILLED ORAL at 21:40

## 2024-08-08 RX ADMIN — DEXAMETHASONE SODIUM PHOSPHATE 4 MG: 10 INJECTION, SOLUTION INTRAMUSCULAR; INTRAVENOUS at 10:42

## 2024-08-08 RX ADMIN — CLINDAMYCIN IN 5 PERCENT DEXTROSE 900 MG: 18 INJECTION, SOLUTION INTRAVENOUS at 11:07

## 2024-08-08 RX ADMIN — DIPHENHYDRAMINE HYDROCHLORIDE 50 MG: 50 CAPSULE ORAL at 22:18

## 2024-08-08 RX ADMIN — SODIUM CHLORIDE, POTASSIUM CHLORIDE, SODIUM LACTATE AND CALCIUM CHLORIDE 125 ML/HR: 600; 310; 30; 20 INJECTION, SOLUTION INTRAVENOUS at 13:02

## 2024-08-08 RX ADMIN — PROPOFOL 75 MG: 10 INJECTION, EMULSION INTRAVENOUS at 07:37

## 2024-08-08 RX ADMIN — Medication: at 08:54

## 2024-08-08 RX ADMIN — SODIUM CHLORIDE, POTASSIUM CHLORIDE, SODIUM LACTATE AND CALCIUM CHLORIDE 125 ML/HR: 600; 310; 30; 20 INJECTION, SOLUTION INTRAVENOUS at 10:15

## 2024-08-08 RX ADMIN — GENTAMICIN SULFATE 360 MG: 40 INJECTION, SOLUTION INTRAMUSCULAR; INTRAVENOUS at 09:10

## 2024-08-08 RX ADMIN — ACETAMINOPHEN 650 MG: 325 TABLET ORAL at 02:41

## 2024-08-08 RX ADMIN — SODIUM CHLORIDE, POTASSIUM CHLORIDE, SODIUM LACTATE AND CALCIUM CHLORIDE 150 ML/HR: 600; 310; 30; 20 INJECTION, SOLUTION INTRAVENOUS at 23:32

## 2024-08-08 RX ADMIN — MAGNESIUM SULFATE HEPTAHYDRATE 2 G/HR: 40 INJECTION, SOLUTION INTRAVENOUS at 05:16

## 2024-08-08 RX ADMIN — SODIUM CHLORIDE, POTASSIUM CHLORIDE, SODIUM LACTATE AND CALCIUM CHLORIDE 125 ML/HR: 600; 310; 30; 20 INJECTION, SOLUTION INTRAVENOUS at 14:37

## 2024-08-08 RX ADMIN — METOCLOPRAMIDE 10 MG: 5 INJECTION, SOLUTION INTRAMUSCULAR; INTRAVENOUS at 05:13

## 2024-08-08 RX ADMIN — ACETAMINOPHEN 1000 MG: 500 TABLET ORAL at 21:40

## 2024-08-08 RX ADMIN — HYDROMORPHONE HYDROCHLORIDE 1 MG: 1 INJECTION, SOLUTION INTRAMUSCULAR; INTRAVENOUS; SUBCUTANEOUS at 22:55

## 2024-08-08 RX ADMIN — Medication 500 ML: at 07:05

## 2024-08-08 RX ADMIN — PROPOFOL 150 MG: 10 INJECTION, EMULSION INTRAVENOUS at 07:02

## 2024-08-08 NOTE — ANESTHESIA PROCEDURE NOTES
Airway  Date/Time: 8/8/2024 7:03 AM    Indications and Patient Condition  Indications for airway management: airway protection    Preoxygenated: yes  MILS maintained throughout  Mask difficulty assessment: 1 - vent by mask    Final Airway Details  Final airway type: endotracheal airway      Successful airway: ETT  Cuffed: yes   Successful intubation technique: direct laryngoscopy  Blade: Davey  Blade size: 3  ETT size (mm): 6.5  Cormack-Lehane Classification: grade I - full view of glottis  Placement verified by: chest auscultation and capnometry   Cuff volume (mL): 10  Measured from: lips  ETT/EBT  to lips (cm): 21  Number of attempts at approach: 1  Assessment: lips, teeth, and gum same as pre-op and atraumatic intubation

## 2024-08-08 NOTE — ANESTHESIA PROCEDURE NOTES
"Bilateral TAP blocks      Patient reassessed immediately prior to procedure    Patient location during procedure: OR  Start time: 8/8/2024 10:42 AM  Reason for block: at surgeon's request and post-op pain management  Performed by  CRNA/CAA: Mateusz Davidson CRNA  Assisted by: Jennifer Talley RN  Preanesthetic Checklist  Completed: patient identified, IV checked, site marked, risks and benefits discussed, surgical consent, monitors and equipment checked, pre-op evaluation and timeout performed  Prep:  Pt Position: supine  Sterile barriers:cap, gloves, mask and washed/disinfected hands  Prep: ChloraPrep  Patient monitoring: blood pressure monitoring, continuous pulse oximetry and EKG  Procedure    Sedation: yes  Performed under: general  Guidance:ultrasound guided  Images:still images obtained, printed/placed on chart    Laterality:Bilateral  Block Type:TAP  Injection Technique:single-shot  Needle Type:short-bevel and echogenic  Needle Gauge:20 G  Resistance on Injection: none    Medications Used: dexamethasone sodium phosphate injection - Injection   4 mg - 8/8/2024 10:42:00 AM  bupivacaine PF (MARCAINE) 0.25 % injection - Injection   60 mL - 8/8/2024 10:42:00 AM      Medications  Comment:Block Injection:  LA dose divided between Right and Left block        Post Assessment  Injection Assessment: negative aspiration for heme, incremental injection and no paresthesia on injection  Patient Tolerance:comfortable throughout block  Complications:no  Additional Notes  Mid-Axillary/Lateral TAPs    A high-frequency linear transducer, with sterile cover, was placed in the midaxillary line between the ASIS and costal margin. The External Oblique Muscle (EOM), Internal Oblique Muscle (IOM), Transverse Abdominus Muscle (ATKINSON), and Peritoneum were identified. The insertion site was prepped in sterile fashion and then localized with 2-5 ml of 1% Lidocaine. Using ultrasound-guidance, a 20-gauge B-Rodriguez 4\" Ultraplex 360 " non-stimulating echogenic needle was advanced in plane, from medial to lateral, until the tip of the needle was in the fascial plane between the IOM and ATKINSON. 1-3ml of preservative free normal saline was used to hydro-dissect the fascial planes. After the fascial plane was verified, the local anesthetic (LA) was injected. The procedure was repeated on the opposite side for bilateral coverage. Aspiration every 5 ml to prevent intravascular injection. Injection was completed with negative aspiration of blood and negative intravascular injection. Injection pressures were normal with minimal resistance. Midaxillary TAPs should reach intercostal nerves T10- T11 and the subcostal nerve T12.    Performed by: Mateusz Davidson CRNA

## 2024-08-08 NOTE — OP NOTE
[place]  Riana Dwyer Ahmad  : 1990  MRN: [id number]  CSN: [id number]     Section Operative Note    Pre-Operative Dx:   Intrauterine pregnancy at 22w1d weeks   Uterine rupture       Postoperative dx:    Intrauterine pregnancy at 22w1d weeks   Uterine rupture            Procedure: Exploratory laparotomy, uterine rupture        Surgeon: Raul Cali MD   Assistant: Kelsey Carey       Anesthesia: General        QBL: 2940 mls.   IV Fluids: 1200 LR, 700 NS, 1900 cry, 720 shanell, 2 units of PRBC   UOP: 100 mls.     Antibiotics: cefazolin 2 gms, will receive Amp/Gent/Clinda for 24 hours      Infant:           Gender: female  infant    Weight: 610 g (1 lb 5.5 oz)     Apgars: 1  @ 1 minute / 1  @ 5 minutes      Called to Wagiovanni's room again after she got up to use the bathroom and had increased bleeding.  She was also noted to have a BP 70s/30s.  Given this report, I brought the USN over to re-scan her.   There was a concern that there may be some blood anterior to the uterus and the infant seemed to be in a different position and difficult to see the fetal head.  There were still FHTs estimated to be in the low 100s.  Dr. Milligan was here and I requested his presence to the room.  While he was en route, I checked her cervix with SVE still closed/thick/high.   Dr. Milligan presented to the room and also agreed that there was a concern that what we were seeing was blood and given her increased pain, we should move to the OR emergently.   She was verbally consented and moved to the OR     Procedure Details:   The patient was placed under general anesthesia and emergently prepped with betadine. A Pfannenstiel incision was made and carried down to the fascia.  She had a previous abdominal plasty performed with remaining sutures still in place so the fascia was cut transversely with the knife and extended in a curvilinear fashion with scissors. The fascia was freed from its midline insertion superiorly and  "inferiorly. Rectus muscles were  in the midline and the peritoneum was bluntly entered.  Copious amount of blood was pouring out at this point.  I cleared away the large blood and clot quickly and felt for the infant.  The infant was in the abdomen and was brought up to the incision. The cord was quickly clamped and cut and handed off to the NICU staff standing by.   The placenta was partially out of the uterus as well.   It was removed the rest of the way and taken of the OR table.  The uterus was able to be exteriorized and was found to have the entire previous classical incision had ruptured.  The uterus was cleared of all clot and debris.  It was then closed in three layers per usual classical incision closure.   The uterus was placed back in the abdomen.  There was still a significant amount of blood and clot in the abdomen so an Keith self retractor was placed.  Large formed clots were removed and then several liters were used to irrigate the abdomen.  The abdomen was explored visualizing both gutters and even up to the liver edge with no other bleeding noted.   Appendix was normal as well.  Once the irrigation was cleared with most of the retained blood evacuated, the Keith was removed.  Bladder blade placed again and the uterine incision reevaluated.  The was some light oozing at the incision site which was cauterized with Bovie.  Surgicel was placed on the uterine incision followed by surgical \"snow\"  Good homeostasis was noted.  The fascia was then closed with \"0\" PDS.    The subcutaneous was closed with 3.0 plain gut.  Skin closed with Insorb Staples and an Exofin dressing was applied.     All counts were correct            Complications:   Uterine rupture       Disposition:   Mother to Remain in LD  in stable condition currently.   Infant was transferred to         Raul Cali MD  8/8/2024  08:48 EDT        "

## 2024-08-08 NOTE — ANESTHESIA POSTPROCEDURE EVALUATION
Patient: Riana Anthony    Procedure Summary       Date: 24 Room / Location: [facility] LABOR DELIVERY 2 /  [facility] LABOR [facility]IVERY    Anesthesia Start: 654 Anesthesia Stop: 820    Procedure:  SECTION REPEAT (Abdomen) Diagnosis:     Surgeons: Raul Cali MD Provider: Be Adams DO    Anesthesia Type: general ASA Status: 3 - Emergent            Anesthesia Type: general    Vitals  Vitals Value Taken Time   BP 98/59 24 0825   Temp 97.8    Pulse 115 24 0829   Resp 18    SpO2 96 % 24 0829   Vitals shown include unfiled device data.        Post Anesthesia Care and Evaluation    Patient location during evaluation: bedside  Patient participation: complete - patient participated  Level of consciousness: awake and alert  Pain management: adequate    Airway patency: patent  Anesthetic complications: No anesthetic complications    Cardiovascular status: acceptable  Respiratory status: acceptable  Hydration status: acceptable

## 2024-08-08 NOTE — PROGRESS NOTES
Labourist:    Called to the room due to Wafa waking up screaming in pain.  She says her abdomen hurts ( same as when she came in) she also got up to use the bathroom and had some bright bleeding.       USN:   SLIUP cephalic with anterior placenta with no obvious signs of abruption.  Normal CAMILLE   There was a moment of fetal bradycardia noted implying that she may be having a contraction.   She would scream out with the lightest touch to her abdomen, but when the USN probe was pressed harder on her uterus, she did not have such response.     SVE is still closed thick and high.    Some scant pinkish blood noted on the glove.     Plan:  1 of Dilaudid IV for pain control  Will start Magnesium 4 gms loading and 2 gms/hr thereafter for possible contractions.    Will await MFM scan and further recommendations

## 2024-08-08 NOTE — LACTATION NOTE
08/08/24 1610   Maternal Information   Person Making Referral lactation consultant   Maternal Reason for Referral separation from infant  (pt reports breastfeeding previous children for 6 months with pain throughout breastfeeding times)   Infant Reason for Referral NICU admission;other (see comments)  ( NICU (22w1d gestation))   Maternal Infant Feeding   Maternal Emotional State other (see comments)  (teary)   Milk Expression/Equipment   Equipment for Home Use needs breast pump   Breast Pumping   Breast Pumping Interventions other (see comments)  (discussed milk production initiation; family uncertain of what the next 72 hrs will hold/whether they want to initiate milk production with unknown infant prognosis; milk may come in anyway & will decide to increase or decrease milk at that time)     Lactation support offered; family to call out as desired for additional visits with Lactation Consultant.

## 2024-08-08 NOTE — ANESTHESIA PREPROCEDURE EVALUATION
Anesthesia Evaluation                  Airway   Dental      Pulmonary    Cardiovascular         Neuro/Psych  (+) psychiatric history Depression and Bipolar  GI/Hepatic/Renal/Endo    (+) obesity, morbid obesity, GERD    Musculoskeletal     (+) back pain  Abdominal    Substance History      OB/GYN    (+) Pregnant        Other        ROS/Med Hx Other: EMERGENT_ UTERINE ABRUPTION              Anesthesia Plan    ASA 3 - emergent     general       Anesthetic plan, risks, benefits, and alternatives have been provided, discussed and informed consent has been obtained with: patient.    CODE STATUS:    Level Of Support Discussed With: Patient  Code Status (Patient has no pulse and is not breathing): CPR (Attempt to Resuscitate)  Medical Interventions (Patient has pulse or is breathing): Full Support

## 2024-08-09 LAB
ALBUMIN SERPL-MCNC: 2.9 G/DL (ref 3.5–5.2)
ALBUMIN/GLOB SERPL: 1.2 G/DL
ALP SERPL-CCNC: 188 U/L (ref 39–117)
ALT SERPL W P-5'-P-CCNC: 14 U/L (ref 1–33)
ANION GAP SERPL CALCULATED.3IONS-SCNC: 6 MMOL/L (ref 5–15)
AST SERPL-CCNC: 36 U/L (ref 1–32)
BACTERIA SPEC AEROBE CULT: NO GROWTH
BASOPHILS # BLD AUTO: 0.04 10*3/MM3 (ref 0–0.2)
BASOPHILS NFR BLD AUTO: 0.4 % (ref 0–1.5)
BH BB BLOOD EXPIRATION DATE: NORMAL
BH BB BLOOD TYPE BARCODE: 5100
BH BB DISPENSE STATUS: NORMAL
BH BB PRODUCT CODE: NORMAL
BH BB UNIT NUMBER: NORMAL
BILIRUB SERPL-MCNC: 0.6 MG/DL (ref 0–1.2)
BUN SERPL-MCNC: 6 MG/DL (ref 6–20)
BUN/CREAT SERPL: 11.3 (ref 7–25)
CA-I SERPL ISE-MCNC: 1.12 MMOL/L (ref 1.12–1.32)
CALCIUM SPEC-SCNC: 7.8 MG/DL (ref 8.6–10.5)
CHLORIDE SERPL-SCNC: 102 MMOL/L (ref 98–107)
CO2 SERPL-SCNC: 25 MMOL/L (ref 22–29)
CREAT SERPL-MCNC: 0.53 MG/DL (ref 0.57–1)
CROSSMATCH INTERPRETATION: NORMAL
CROSSMATCH INTERPRETATION: NORMAL
DEPRECATED RDW RBC AUTO: 46.5 FL (ref 37–54)
DEPRECATED RDW RBC AUTO: 48.6 FL (ref 37–54)
EGFRCR SERPLBLD CKD-EPI 2021: 125.4 ML/MIN/1.73
EOSINOPHIL # BLD AUTO: 0.13 10*3/MM3 (ref 0–0.4)
EOSINOPHIL NFR BLD AUTO: 1.2 % (ref 0.3–6.2)
ERYTHROCYTE [DISTWIDTH] IN BLOOD BY AUTOMATED COUNT: 14.7 % (ref 12.3–15.4)
ERYTHROCYTE [DISTWIDTH] IN BLOOD BY AUTOMATED COUNT: 15.2 % (ref 12.3–15.4)
FIBRINOGEN PPP-MCNC: 413 MG/DL (ref 203–470)
GLOBULIN UR ELPH-MCNC: 2.4 GM/DL
GLUCOSE SERPL-MCNC: 81 MG/DL (ref 65–99)
HCT VFR BLD AUTO: 24.4 % (ref 34–46.6)
HCT VFR BLD AUTO: 26.3 % (ref 34–46.6)
HGB BLD-MCNC: 8.2 G/DL (ref 12–15.9)
HGB BLD-MCNC: 8.7 G/DL (ref 12–15.9)
IMM GRANULOCYTES # BLD AUTO: 0.08 10*3/MM3 (ref 0–0.05)
IMM GRANULOCYTES NFR BLD AUTO: 0.7 % (ref 0–0.5)
LYMPHOCYTES # BLD AUTO: 3.38 10*3/MM3 (ref 0.7–3.1)
LYMPHOCYTES NFR BLD AUTO: 31.1 % (ref 19.6–45.3)
MCH RBC QN AUTO: 29.1 PG (ref 26.6–33)
MCH RBC QN AUTO: 29.2 PG (ref 26.6–33)
MCHC RBC AUTO-ENTMCNC: 33.1 G/DL (ref 31.5–35.7)
MCHC RBC AUTO-ENTMCNC: 33.6 G/DL (ref 31.5–35.7)
MCV RBC AUTO: 86.5 FL (ref 79–97)
MCV RBC AUTO: 88.3 FL (ref 79–97)
MONOCYTES # BLD AUTO: 0.7 10*3/MM3 (ref 0.1–0.9)
MONOCYTES NFR BLD AUTO: 6.4 % (ref 5–12)
NEUTROPHILS NFR BLD AUTO: 6.53 10*3/MM3 (ref 1.7–7)
NEUTROPHILS NFR BLD AUTO: 60.2 % (ref 42.7–76)
NRBC BLD AUTO-RTO: 0 /100 WBC (ref 0–0.2)
PLATELET # BLD AUTO: 176 10*3/MM3 (ref 140–450)
PLATELET # BLD AUTO: 213 10*3/MM3 (ref 140–450)
PMV BLD AUTO: 10.2 FL (ref 6–12)
PMV BLD AUTO: 9.9 FL (ref 6–12)
POTASSIUM SERPL-SCNC: 3.6 MMOL/L (ref 3.5–5.2)
PROT SERPL-MCNC: 5.3 G/DL (ref 6–8.5)
RBC # BLD AUTO: 2.82 10*6/MM3 (ref 3.77–5.28)
RBC # BLD AUTO: 2.98 10*6/MM3 (ref 3.77–5.28)
SODIUM SERPL-SCNC: 133 MMOL/L (ref 136–145)
UNIT  ABO: NORMAL
UNIT  RH: NORMAL
WBC NRBC COR # BLD AUTO: 10.74 10*3/MM3 (ref 3.4–10.8)
WBC NRBC COR # BLD AUTO: 10.86 10*3/MM3 (ref 3.4–10.8)

## 2024-08-09 PROCEDURE — 25010000002 CLINDAMYCIN 900 MG/50ML SOLUTION: Performed by: OBSTETRICS & GYNECOLOGY

## 2024-08-09 PROCEDURE — 25810000003 SODIUM CHLORIDE 0.9 % SOLUTION: Performed by: OBSTETRICS & GYNECOLOGY

## 2024-08-09 PROCEDURE — 63710000001 DIPHENHYDRAMINE PER 50 MG: Performed by: OBSTETRICS & GYNECOLOGY

## 2024-08-09 PROCEDURE — 85025 COMPLETE CBC W/AUTO DIFF WBC: CPT | Performed by: OBSTETRICS & GYNECOLOGY

## 2024-08-09 PROCEDURE — 85027 COMPLETE CBC AUTOMATED: CPT | Performed by: OBSTETRICS & GYNECOLOGY

## 2024-08-09 PROCEDURE — 25010000002 AMPICILLIN PER 500 MG: Performed by: OBSTETRICS & GYNECOLOGY

## 2024-08-09 PROCEDURE — 25810000003 LACTATED RINGERS PER 1000 ML: Performed by: OBSTETRICS & GYNECOLOGY

## 2024-08-09 PROCEDURE — 82330 ASSAY OF CALCIUM: CPT | Performed by: OBSTETRICS & GYNECOLOGY

## 2024-08-09 PROCEDURE — 80053 COMPREHEN METABOLIC PANEL: CPT | Performed by: OBSTETRICS & GYNECOLOGY

## 2024-08-09 PROCEDURE — 85384 FIBRINOGEN ACTIVITY: CPT | Performed by: OBSTETRICS & GYNECOLOGY

## 2024-08-09 PROCEDURE — 25010000002 HYDROMORPHONE 1 MG/ML SOLUTION: Performed by: OBSTETRICS & GYNECOLOGY

## 2024-08-09 RX ORDER — DIPHENHYDRAMINE HCL 50 MG
50 CAPSULE ORAL EVERY 6 HOURS PRN
Status: DISCONTINUED | OUTPATIENT
Start: 2024-08-09 | End: 2024-08-10 | Stop reason: HOSPADM

## 2024-08-09 RX ORDER — DIPHENHYDRAMINE HCL 25 MG
25 CAPSULE ORAL EVERY 6 HOURS PRN
Status: DISCONTINUED | OUTPATIENT
Start: 2024-08-09 | End: 2024-08-09 | Stop reason: SDUPTHER

## 2024-08-09 RX ORDER — IBUPROFEN 600 MG/1
600 TABLET ORAL EVERY 6 HOURS SCHEDULED
Status: DISCONTINUED | OUTPATIENT
Start: 2024-08-09 | End: 2024-08-10 | Stop reason: HOSPADM

## 2024-08-09 RX ORDER — HYDROXYZINE HYDROCHLORIDE 25 MG/1
25 TABLET, FILM COATED ORAL 3 TIMES DAILY PRN
Status: DISCONTINUED | OUTPATIENT
Start: 2024-08-09 | End: 2024-08-10 | Stop reason: HOSPADM

## 2024-08-09 RX ORDER — OXYCODONE HYDROCHLORIDE 5 MG/1
10 TABLET ORAL EVERY 4 HOURS PRN
Status: DISCONTINUED | OUTPATIENT
Start: 2024-08-09 | End: 2024-08-10 | Stop reason: HOSPADM

## 2024-08-09 RX ORDER — ZOLPIDEM TARTRATE 5 MG/1
5 TABLET ORAL NIGHTLY PRN
Status: DISCONTINUED | OUTPATIENT
Start: 2024-08-09 | End: 2024-08-10 | Stop reason: HOSPADM

## 2024-08-09 RX ORDER — POLYETHYLENE GLYCOL 3350 17 G/17G
17 POWDER, FOR SOLUTION ORAL DAILY
Status: DISCONTINUED | OUTPATIENT
Start: 2024-08-09 | End: 2024-08-09

## 2024-08-09 RX ADMIN — AMPICILLIN SODIUM 2 G: 2 INJECTION, POWDER, FOR SOLUTION INTRAMUSCULAR; INTRAVENOUS at 03:35

## 2024-08-09 RX ADMIN — SODIUM CHLORIDE 500 ML: 9 INJECTION, SOLUTION INTRAVENOUS at 00:45

## 2024-08-09 RX ADMIN — HYDROMORPHONE HYDROCHLORIDE 1 MG: 1 INJECTION, SOLUTION INTRAMUSCULAR; INTRAVENOUS; SUBCUTANEOUS at 03:33

## 2024-08-09 RX ADMIN — DOCUSATE SODIUM 100 MG: 100 CAPSULE, LIQUID FILLED ORAL at 08:39

## 2024-08-09 RX ADMIN — IBUPROFEN 600 MG: 600 TABLET, FILM COATED ORAL at 18:06

## 2024-08-09 RX ADMIN — POLYETHYLENE GLYCOL 3350 17 G: 17 POWDER, FOR SOLUTION ORAL at 14:18

## 2024-08-09 RX ADMIN — PANTOPRAZOLE SODIUM 40 MG: 40 INJECTION, POWDER, FOR SOLUTION INTRAVENOUS at 08:52

## 2024-08-09 RX ADMIN — ACETAMINOPHEN 650 MG: 325 TABLET ORAL at 16:15

## 2024-08-09 RX ADMIN — ZOLPIDEM TARTRATE 5 MG: 5 TABLET, COATED ORAL at 20:47

## 2024-08-09 RX ADMIN — ACETAMINOPHEN 650 MG: 325 TABLET ORAL at 22:57

## 2024-08-09 RX ADMIN — CLINDAMYCIN IN 5 PERCENT DEXTROSE 900 MG: 18 INJECTION, SOLUTION INTRAVENOUS at 02:22

## 2024-08-09 RX ADMIN — DIPHENHYDRAMINE HYDROCHLORIDE 50 MG: 50 CAPSULE ORAL at 05:39

## 2024-08-09 RX ADMIN — MAGNESIUM HYDROXIDE 10 ML: 400 SUSPENSION ORAL at 08:33

## 2024-08-09 RX ADMIN — SIMETHICONE 80 MG: 80 TABLET, CHEWABLE ORAL at 08:34

## 2024-08-09 RX ADMIN — Medication 3 ML: at 09:00

## 2024-08-09 RX ADMIN — HYDROXYZINE HYDROCHLORIDE 25 MG: 25 TABLET ORAL at 02:22

## 2024-08-09 RX ADMIN — ACETAMINOPHEN 1000 MG: 500 TABLET ORAL at 03:39

## 2024-08-09 RX ADMIN — OXYCODONE HYDROCHLORIDE 10 MG: 5 TABLET ORAL at 19:44

## 2024-08-09 RX ADMIN — SERTRALINE HYDROCHLORIDE 25 MG: 50 TABLET ORAL at 08:40

## 2024-08-09 RX ADMIN — LAMOTRIGINE 100 MG: 100 TABLET ORAL at 08:38

## 2024-08-09 RX ADMIN — ACETAMINOPHEN 650 MG: 325 TABLET ORAL at 10:13

## 2024-08-09 RX ADMIN — OXYCODONE HYDROCHLORIDE 10 MG: 5 TABLET ORAL at 08:43

## 2024-08-09 RX ADMIN — OXYCODONE HYDROCHLORIDE 10 MG: 5 TABLET ORAL at 16:15

## 2024-08-09 RX ADMIN — OXYCODONE HYDROCHLORIDE 10 MG: 5 TABLET ORAL at 23:58

## 2024-08-09 RX ADMIN — PRENATAL VITAMINS-IRON FUMARATE 27 MG IRON-FOLIC ACID 0.8 MG TABLET 1 TABLET: at 08:41

## 2024-08-09 RX ADMIN — DOCUSATE SODIUM 100 MG: 100 CAPSULE, LIQUID FILLED ORAL at 20:39

## 2024-08-09 RX ADMIN — SIMETHICONE 80 MG: 80 TABLET, CHEWABLE ORAL at 20:47

## 2024-08-09 RX ADMIN — SODIUM CHLORIDE, POTASSIUM CHLORIDE, SODIUM LACTATE AND CALCIUM CHLORIDE 125 ML/HR: 600; 310; 30; 20 INJECTION, SOLUTION INTRAVENOUS at 06:37

## 2024-08-09 RX ADMIN — SIMETHICONE 80 MG: 80 TABLET, CHEWABLE ORAL at 14:23

## 2024-08-09 RX ADMIN — IBUPROFEN 600 MG: 600 TABLET, FILM COATED ORAL at 12:35

## 2024-08-09 RX ADMIN — SIMETHICONE 80 MG: 80 TABLET, CHEWABLE ORAL at 08:37

## 2024-08-09 RX ADMIN — OXYCODONE HYDROCHLORIDE 10 MG: 5 TABLET ORAL at 12:35

## 2024-08-09 NOTE — ANESTHESIA POSTPROCEDURE EVALUATION
Patient: Riana Anthony    Procedure Summary       Date: 24 Room / Location: [facility] LABOR DELIVERY 2   [facility] LABOR DELIVERY    Anesthesia Start: 654 Anesthesia Stop: 820    Procedure:  SECTION REPEAT (Abdomen) Diagnosis:     Surgeons: Raul Cali MD Provider: Be Adams DO    Anesthesia Type: general ASA Status: 3 - Emergent            Anesthesia Type: general    Vitals  Vitals Value Taken Time   BP 94/60 24 0733   Temp 98 °F (36.7 °C) 24 0342   Pulse 92 24 0736   Resp 16 24 0500   SpO2 96 % 24 0736   Vitals shown include unfiled device data.        Post Anesthesia Care and Evaluation    Patient location during evaluation: bedside  Patient participation: complete - patient participated  Level of consciousness: awake and awake and alert  Pain score: 0  Pain management: satisfactory to patient    Airway patency: patent  Anesthetic complications: No anesthetic complications  PONV Status: none  Cardiovascular status: acceptable, hemodynamically stable and stable  Respiratory status: acceptable  Hydration status: stable  Post Neuraxial Block status: Motor and sensory function returned to baseline and No signs or symptoms of PDPH

## 2024-08-10 VITALS
RESPIRATION RATE: 16 BRPM | DIASTOLIC BLOOD PRESSURE: 68 MMHG | HEIGHT: 66 IN | HEART RATE: 78 BPM | TEMPERATURE: 98 F | WEIGHT: 204 LBS | BODY MASS INDEX: 32.78 KG/M2 | OXYGEN SATURATION: 96 % | SYSTOLIC BLOOD PRESSURE: 113 MMHG

## 2024-08-10 PROBLEM — O26.899 ABDOMINAL PAIN AFFECTING PREGNANCY: Status: RESOLVED | Noted: 2024-08-06 | Resolved: 2024-08-10

## 2024-08-10 PROBLEM — Z98.891 STATUS POST EMERGENCY CESAREAN SECTION: Status: ACTIVE | Noted: 2024-08-10

## 2024-08-10 PROBLEM — R10.9 ABDOMINAL PAIN AFFECTING PREGNANCY: Status: RESOLVED | Noted: 2024-08-06 | Resolved: 2024-08-10

## 2024-08-10 LAB
ANION GAP SERPL CALCULATED.3IONS-SCNC: 8 MMOL/L (ref 5–15)
BASOPHILS # BLD AUTO: 0.04 10*3/MM3 (ref 0–0.2)
BASOPHILS # BLD AUTO: 0.04 10*3/MM3 (ref 0–0.2)
BASOPHILS NFR BLD AUTO: 0.5 % (ref 0–1.5)
BASOPHILS NFR BLD AUTO: 0.5 % (ref 0–1.5)
BH BB BLOOD EXPIRATION DATE: NORMAL
BH BB BLOOD TYPE BARCODE: 5100
BH BB DISPENSE STATUS: NORMAL
BH BB PRODUCT CODE: NORMAL
BH BB UNIT NUMBER: NORMAL
BUN SERPL-MCNC: 6 MG/DL (ref 6–20)
BUN/CREAT SERPL: 14.3 (ref 7–25)
CALCIUM SPEC-SCNC: 7.6 MG/DL (ref 8.6–10.5)
CHLORIDE SERPL-SCNC: 105 MMOL/L (ref 98–107)
CO2 SERPL-SCNC: 23 MMOL/L (ref 22–29)
CREAT SERPL-MCNC: 0.42 MG/DL (ref 0.57–1)
CROSSMATCH INTERPRETATION: NORMAL
DEPRECATED RDW RBC AUTO: 47.6 FL (ref 37–54)
DEPRECATED RDW RBC AUTO: 49.6 FL (ref 37–54)
EGFRCR SERPLBLD CKD-EPI 2021: 132.6 ML/MIN/1.73
EOSINOPHIL # BLD AUTO: 0.14 10*3/MM3 (ref 0–0.4)
EOSINOPHIL # BLD AUTO: 0.16 10*3/MM3 (ref 0–0.4)
EOSINOPHIL NFR BLD AUTO: 1.7 % (ref 0.3–6.2)
EOSINOPHIL NFR BLD AUTO: 2.1 % (ref 0.3–6.2)
ERYTHROCYTE [DISTWIDTH] IN BLOOD BY AUTOMATED COUNT: 15.1 % (ref 12.3–15.4)
ERYTHROCYTE [DISTWIDTH] IN BLOOD BY AUTOMATED COUNT: 15.3 % (ref 12.3–15.4)
FIBRINOGEN PPP-MCNC: 366 MG/DL (ref 203–470)
GLUCOSE SERPL-MCNC: 66 MG/DL (ref 65–99)
HCT VFR BLD AUTO: 23.1 % (ref 34–46.6)
HCT VFR BLD AUTO: 25 % (ref 34–46.6)
HGB BLD-MCNC: 7.8 G/DL (ref 12–15.9)
HGB BLD-MCNC: 8.2 G/DL (ref 12–15.9)
IMM GRANULOCYTES # BLD AUTO: 0.06 10*3/MM3 (ref 0–0.05)
IMM GRANULOCYTES # BLD AUTO: 0.07 10*3/MM3 (ref 0–0.05)
IMM GRANULOCYTES NFR BLD AUTO: 0.7 % (ref 0–0.5)
IMM GRANULOCYTES NFR BLD AUTO: 0.9 % (ref 0–0.5)
LYMPHOCYTES # BLD AUTO: 2.96 10*3/MM3 (ref 0.7–3.1)
LYMPHOCYTES # BLD AUTO: 3.2 10*3/MM3 (ref 0.7–3.1)
LYMPHOCYTES NFR BLD AUTO: 36.8 % (ref 19.6–45.3)
LYMPHOCYTES NFR BLD AUTO: 41.3 % (ref 19.6–45.3)
MCH RBC QN AUTO: 29.3 PG (ref 26.6–33)
MCH RBC QN AUTO: 29.5 PG (ref 26.6–33)
MCHC RBC AUTO-ENTMCNC: 32.8 G/DL (ref 31.5–35.7)
MCHC RBC AUTO-ENTMCNC: 33.8 G/DL (ref 31.5–35.7)
MCV RBC AUTO: 86.8 FL (ref 79–97)
MCV RBC AUTO: 89.9 FL (ref 79–97)
MONOCYTES # BLD AUTO: 0.43 10*3/MM3 (ref 0.1–0.9)
MONOCYTES # BLD AUTO: 0.57 10*3/MM3 (ref 0.1–0.9)
MONOCYTES NFR BLD AUTO: 5.5 % (ref 5–12)
MONOCYTES NFR BLD AUTO: 7.1 % (ref 5–12)
NEUTROPHILS NFR BLD AUTO: 3.85 10*3/MM3 (ref 1.7–7)
NEUTROPHILS NFR BLD AUTO: 4.28 10*3/MM3 (ref 1.7–7)
NEUTROPHILS NFR BLD AUTO: 49.7 % (ref 42.7–76)
NEUTROPHILS NFR BLD AUTO: 53.2 % (ref 42.7–76)
NRBC BLD AUTO-RTO: 0 /100 WBC (ref 0–0.2)
NRBC BLD AUTO-RTO: 0 /100 WBC (ref 0–0.2)
PLATELET # BLD AUTO: 175 10*3/MM3 (ref 140–450)
PLATELET # BLD AUTO: 209 10*3/MM3 (ref 140–450)
PMV BLD AUTO: 10.1 FL (ref 6–12)
PMV BLD AUTO: 9.7 FL (ref 6–12)
POTASSIUM SERPL-SCNC: 3.7 MMOL/L (ref 3.5–5.2)
RBC # BLD AUTO: 2.66 10*6/MM3 (ref 3.77–5.28)
RBC # BLD AUTO: 2.78 10*6/MM3 (ref 3.77–5.28)
SODIUM SERPL-SCNC: 136 MMOL/L (ref 136–145)
UNIT  ABO: NORMAL
UNIT  RH: NORMAL
WBC NRBC COR # BLD AUTO: 7.75 10*3/MM3 (ref 3.4–10.8)
WBC NRBC COR # BLD AUTO: 8.05 10*3/MM3 (ref 3.4–10.8)

## 2024-08-10 PROCEDURE — 63710000001 DIPHENHYDRAMINE PER 50 MG: Performed by: OBSTETRICS & GYNECOLOGY

## 2024-08-10 PROCEDURE — 85025 COMPLETE CBC W/AUTO DIFF WBC: CPT | Performed by: OBSTETRICS & GYNECOLOGY

## 2024-08-10 PROCEDURE — 80048 BASIC METABOLIC PNL TOTAL CA: CPT | Performed by: OBSTETRICS & GYNECOLOGY

## 2024-08-10 PROCEDURE — 85384 FIBRINOGEN ACTIVITY: CPT | Performed by: OBSTETRICS & GYNECOLOGY

## 2024-08-10 RX ORDER — OXYCODONE HYDROCHLORIDE 10 MG/1
5 TABLET ORAL EVERY 4 HOURS PRN
Qty: 12 TABLET | Refills: 0 | Status: SHIPPED | OUTPATIENT
Start: 2024-08-10 | End: 2024-08-14

## 2024-08-10 RX ADMIN — DIPHENHYDRAMINE HYDROCHLORIDE 50 MG: 50 CAPSULE ORAL at 00:45

## 2024-08-10 RX ADMIN — SERTRALINE HYDROCHLORIDE 25 MG: 50 TABLET ORAL at 08:10

## 2024-08-10 RX ADMIN — OXYCODONE HYDROCHLORIDE 10 MG: 5 TABLET ORAL at 12:26

## 2024-08-10 RX ADMIN — OXYCODONE HYDROCHLORIDE 10 MG: 5 TABLET ORAL at 08:16

## 2024-08-10 RX ADMIN — ACETAMINOPHEN 650 MG: 325 TABLET ORAL at 03:28

## 2024-08-10 RX ADMIN — DOCUSATE SODIUM 100 MG: 100 CAPSULE, LIQUID FILLED ORAL at 08:10

## 2024-08-10 RX ADMIN — ACETAMINOPHEN 650 MG: 325 TABLET ORAL at 10:19

## 2024-08-10 RX ADMIN — IBUPROFEN 600 MG: 600 TABLET, FILM COATED ORAL at 06:08

## 2024-08-10 RX ADMIN — IBUPROFEN 600 MG: 600 TABLET, FILM COATED ORAL at 11:43

## 2024-08-10 RX ADMIN — PRENATAL VITAMINS-IRON FUMARATE 27 MG IRON-FOLIC ACID 0.8 MG TABLET 1 TABLET: at 08:10

## 2024-08-10 RX ADMIN — LAMOTRIGINE 100 MG: 100 TABLET ORAL at 08:12

## 2024-08-10 RX ADMIN — IBUPROFEN 600 MG: 600 TABLET, FILM COATED ORAL at 00:45

## 2024-08-10 RX ADMIN — OXYCODONE HYDROCHLORIDE 10 MG: 5 TABLET ORAL at 03:45

## 2024-08-10 NOTE — DISCHARGE SUMMARY
Discharge Summary    Date of Admission: 2024  Date of Discharge:  8/10/2024      Patient: Riana Anthony      MR#:[id number]    Primary Surgeon/OB: Raul Cali MD    Discharge Surgeon/OB: Little Cagle MD    ATTENDING:  Grey Sumner    Presenting Problem/History of Present Illness  Abdominal pain affecting pregnancy [O26.899, R10.9]       Supraumbilical hernia    Status post bariatric surgery    Ventral hernia    History of classical  section    Nausea/vomiting in pregnancy    Bipolar disease in pregnancy    Status post emergency  section         Discharge Diagnosis:  section at 22w1d    Procedures:  , Classical     2024    7:03 AM        Rh Immune globulin given: no    Rubella vaccine given: no    Discharge Date: 8/10/2024; Discharge Time: 11:50 EDT    Early Discharge:  NO    Hospital Course  Patient is a 34 y.o. female  at 22w1d status post emergent  section due to uterine rupture of previous classical incision.  She required transfusion to secure hemodynamic stability.  Baby was transferred immediately to  NICU but did not survive.  Patient desires d/c home on POD #2.  She has a relationship with a psychiatrist and will f/u immediately.  Patient was advanced to regular diet on postoperative day#1.  On discharge, ambulating, tolerating a regular diet without any difficulties and her incision is dry, clean and intact.     Infant:   female  fetus 610 g (1 lb 5.5 oz)  with Apgar scores of 1 , 1  at five minutes.  Transferred to  but did not survive due to extreme prematurity.    Condition on Discharge:  Stable    Vital Signs  Temp:  [97.6 °F (36.4 °C)-98.3 °F (36.8 °C)] 98 °F (36.7 °C)  Heart Rate:  [72-86] 78  Resp:  [14-18] 16  BP: ()/(51-68) 113/68    Lab Results   Component Value Date    WBC 7.75 08/10/2024    HGB 8.2 (L) 08/10/2024    HCT 25.0 (L) 08/10/2024    MCV 89.9 08/10/2024     08/10/2024       Discharge Disposition  Home  or Self Care    Discharge Medications     Discharge Medications        New Medications        Instructions Start Date   oxyCODONE 10 MG tablet  Commonly known as: ROXICODONE   5 mg, Oral, Every 4 Hours PRN             Changes to Medications        Instructions Start Date   promethazine 12.5 MG tablet  Commonly known as: PHENERGAN  What changed: Another medication with the same name was removed. Continue taking this medication, and follow the directions you see here.   12.5 mg, Oral, Every 6 Hours PRN             Continue These Medications        Instructions Start Date   famotidine 20 MG tablet  Commonly known as: Pepcid   20 mg, Oral, 2 Times Daily      lamoTRIgine 100 MG tablet  Commonly known as: LaMICtal   100 mg, Oral, Daily      ondansetron 8 MG tablet  Commonly known as: Zofran   8 mg, Oral, Every 8 Hours PRN      pantoprazole 40 MG EC tablet  Commonly known as: Protonix   40 mg, Oral, Daily      sertraline 25 MG tablet  Commonly known as: Zoloft   25 mg, Oral, Daily               Discharge Diet:     Activity at Discharge:   Activity Instructions       Activity as Tolerated      Driving Restrictions      Type of Restriction: Driving    Driving Restrictions: No Driving Until Next Appointment    Other Instructions (Specify)      No driving for 2 weeks, No lifting over 10lbs for 6 weeks.    Pelvic Rest      Pelvic rest including no tampons, no tub baths, and no intercourse until 6 weeks/cleared by Provider.    Sexual Activity Restrictions      Type of Restriction: Sex    Explain Sexual Activity Restrictions: 6 weeks            Follow-up Appointments  Future Appointments   Date Time Provider Department Center   8/21/2024  8:40 AM Grey Stroud MD [facility]  [facility]   11/27/2024  8:30 AM Rashida Reveles APRN [facility]     Additional Instructions for the Follow-ups that You Need to Schedule       Call MD for problems / concerns.   As directed      Please call with concerns of depression.    Order  Comments: Please call with concerns of depression.         Discharge Follow-up with Specialty: Office APRN @ 2 weeks and Primary OB @ 6 weeks; 2 Weeks   As directed      Specialty: Office APRN @ 2 weeks and Primary OB @ 6 weeks   Follow Up: 2 Weeks   Follow Up Details: Please schedule Appointment at 2 weeks with APRN and 6 weeks with Primary OB                Little Cagle MD  08/10/24  11:49 EDT  [facility]

## 2024-08-10 NOTE — NURSING NOTE
Discharge instructions reviewed with patient and support person. Advised patient to return to labor forbes or call the office for any questions or concerns. Reviewed postpartum care after  section. Also, provided education regarding pain medication and provided last dose given for medications. Patient verbalized understanding. Ambulated to private vehicle with all belongings in stable condition. accompanied by significant other.

## 2024-08-11 ENCOUNTER — PATIENT MESSAGE (OUTPATIENT)
Dept: GASTROENTEROLOGY | Facility: CLINIC | Age: 34
End: 2024-08-11
Payer: MEDICAID

## 2024-08-12 NOTE — PAYOR COMM NOTE
"Riana Anthony (34 y.o. Female)     From:Rosa Sheppard LPN, Utilization Review  Phone #[phone]  Fax #[phone]      Discharged 8/10/24.    Needs 1 additional day approved.    Auth#[id number]          Date of Birth   1990    Social Security Number       Address   [address] [place], [place] [address]    Home Phone   [phone]    MRN   [id number]       Yazidi   Restorationism    Marital Status                               Admission Date   24    Admission Type   Elective    Admitting Provider   Rena Pinzon MD    Attending Provider       Department, Room/Bed   [facility] ANTEPARTUM, N332/1       Discharge Date   8/10/2024    Discharge Disposition   Home or Self Care    Discharge Destination                                 Attending Provider: (none)   Allergies: Pork-derived Products    Isolation: None   Infection: None   Code Status: Prior    Ht: 167.6 cm (66\")   Wt: 92.5 kg (204 lb)    Admission Cmt: None   Principal Problem: Abdominal pain affecting pregnancy [O26.899,R10.9]                   Active Insurance as of 2024       Primary Coverage       Payor Plan Insurance Group Employer/Plan Group    [place] [id number]       Payor Plan Address Payor Plan Phone Number Payor Plan Fax Number Effective Dates    [employer] [address] [address] [phone]  2020 - None Entered    [facility] [id number]         Subscriber Name Subscriber Birth Date Member ID       RIANA ANTHONY 1990 [id number]                     Emergency Contacts        (Rel.) Home Phone Work Phone Mobile Phone    Stephen Anthony (Spouse) [phone] -- [phone]                 Operative/Procedure Notes (last 7 days)        Raul Cali MD at 24 0702          [facility]  Riana Anthony  : 1990  MRN: [id number]  CSN: [id number]     Section Operative Note    Pre-Operative Dx:   Intrauterine pregnancy at 22w1d " weeks   Uterine rupture       Postoperative dx:    Intrauterine pregnancy at 22w1d weeks   Uterine rupture            Procedure: Exploratory laparotomy, uterine rupture        Surgeon: Raul Cali MD   Assistant: Kelsey Carey       Anesthesia: General        QBL: 2940 mls.   IV Fluids: 1200 LR, 700 NS, 1900 cry, 720 shanell, 2 units of PRBC   UOP: 100 mls.     Antibiotics: cefazolin 2 gms, will receive Amp/Gent/Clinda for 24 hours      Infant:           Gender: female  infant    Weight: 610 g (1 lb 5.5 oz)     Apgars: 1  @ 1 minute / 1  @ 5 minutes      Called to [facility]'s room again after she got up to use the bathroom and had increased bleeding.  She was also noted to have a BP 70s/30s.  Given this report, I brought the USN over to re-scan her.   There was a concern that there may be some blood anterior to the uterus and the infant seemed to be in a different position and difficult to see the fetal head.  There were still FHTs estimated to be in the low 100s.  Dr. Milligan was here and I requested his presence to the room.  While he was en route, I checked her cervix with SVE still closed/thick/high.   Dr. Milligan presented to the room and also agreed that there was a concern that what we were seeing was blood and given her increased pain, we should move to the OR emergently.   She was verbally consented and moved to the OR     Procedure Details:   The patient was placed under general anesthesia and emergently prepped with betadine. A Pfannenstiel incision was made and carried down to the fascia.  She had a previous abdominal plasty performed with remaining sutures still in place so the fascia was cut transversely with the knife and extended in a curvilinear fashion with scissors. The fascia was freed from its midline insertion superiorly and inferiorly. Rectus muscles were  in the midline and the peritoneum was bluntly entered.  Copious amount of blood was pouring out at this point.  I cleared away the  "large blood and clot quickly and felt for the infant.  The infant was in the abdomen and was brought up to the incision. The cord was quickly clamped and cut and handed off to the NICU staff standing by.   The placenta was partially out of the uterus as well.   It was removed the rest of the way and taken of the OR table.  The uterus was able to be exteriorized and was found to have the entire previous classical incision had ruptured.  The uterus was cleared of all clot and debris.  It was then closed in three layers per usual classical incision closure.   The uterus was placed back in the abdomen.  There was still a significant amount of blood and clot in the abdomen so an Keith self retractor was placed.  Large formed clots were removed and then several liters were used to irrigate the abdomen.  The abdomen was explored visualizing both gutters and even up to the liver edge with no other bleeding noted.   Appendix was normal as well.  Once the irrigation was cleared with most of the retained blood evacuated, the Keith was removed.  Bladder blade placed again and the uterine incision reevaluated.  The was some light oozing at the incision site which was cauterized with Bovie.  Surgicel was placed on the uterine incision followed by surgical \"snow\"  Good homeostasis was noted.  The fascia was then closed with \"0\" PDS.    The subcutaneous was closed with 3.0 plain gut.  Skin closed with Insorb Staples and an Exofin dressing was applied.     All counts were correct            Complications:   Uterine rupture       Disposition:   Mother to Remain in LD  in stable condition currently.   Infant was transferred to         Raul Cali MD  8/8/2024  08:48 EDT          Electronically signed by Raul Cali MD at 08/08/24 6872          Physician Progress Notes (last 4 days)        Arlene Campbell MD at 08/09/24 8379            8/9/2024    Name:Riana Anthony    MR#:[id number]    PROGRESS NOTE:  " Post-Op Day 1 S/P exploratory laparotomy and delivery of fetus, repair of hysterotomy for uterine rupture   HD:3    Subjective   34 y.o. yo Female  s/p CS at 22w1d doing pretty well. Pain well controlled. Tolerating regular diet and having flatus. Lochia normal. Appropriately sad.  Baby just passed at .      Abdominal pain affecting pregnancy    Supraumbilical hernia    Status post bariatric surgery    Ventral hernia    History of classical  section    Nausea/vomiting in pregnancy    Bipolar disease in pregnancy        Objective    Vitals  Temp:  Temp:  [98 °F (36.7 °C)-98.7 °F (37.1 °C)] 98.3 °F (36.8 °C)  Temp src: Oral  BP:  BP: ()/(48-62) 106/60  Pulse:  Heart Rate:  [] 81  RR:   Resp:  [16-18] 16    General Awake, alert, no distress  Abdomen Soft, mildly distended, fundus firm, below umbilicus, appropriately tender  Incision  Intact, no erythema or exudate  Extremities Calves NT bilaterally, 1+ edema     I/O last 3 completed shifts:  In: 4388 [I.V.:1900; Blood:2488]  Out: 3795 [Urine:855; Blood:2940]    LABS:   Lab Results   Component Value Date    WBC 10.74 2024    HGB 8.2 (L) 2024    HCT 24.4 (L) 2024    MCV 86.5 2024     2024       Infant: female       Assessment   1.  POD 1    Plan: Doing well.  Hemodynamically stable.  Routine postoperative care  Patient wants IV out.  Will recheck labs to be sure stable first.  Sleep meds.    As long as still doing well in AM, plan for likely d/c home.  Patient has psychiatrist she sees for bipolar disorder and plans to f/u soon with them.  She will need f/u with Dr. Stroud in the next 1-2wk.    Arlene Campbell MD  2024 18:51 EDT    Electronically signed by Arlene Campbell MD at 24 5394          Discharge Summary        Little Cagle MD at 08/10/24 1149          Discharge Summary    Date of Admission: 2024  Date of Discharge:  8/10/2024      Patient: Riana Dwyer  Ahmad      MR#:[id number]    Primary Surgeon/OB: Raul Cali MD    Discharge Surgeon/OB: Little Cagle MD    ATTENDING:  Grey Sumner    Presenting Problem/History of Present Illness  Abdominal pain affecting pregnancy [O26.899, R10.9]       Supraumbilical hernia    Status post bariatric surgery    Ventral hernia    History of classical  section    Nausea/vomiting in pregnancy    Bipolar disease in pregnancy    Status post emergency  section         Discharge Diagnosis:  section at 22w1d    Procedures:  , Classical     2024    7:03 AM        Rh Immune globulin given: no    Rubella vaccine given: no    Discharge Date: 8/10/2024; Discharge Time: 11:50 EDT    Early Discharge:  NO    Hospital Course  Patient is a 34 y.o. female  at 22w1d status post emergent  section due to uterine rupture of previous classical incision.  She required transfusion to secure hemodynamic stability.  Baby was transferred immediately to  NICU but did not survive.  Patient desires d/c home on POD #2.  She has a relationship with a psychiatrist and will f/u immediately.  Patient was advanced to regular diet on postoperative day#1.  On discharge, ambulating, tolerating a regular diet without any difficulties and her incision is dry, clean and intact.     Infant:   female  fetus 610 g (1 lb 5.5 oz)  with Apgar scores of 1 , 1  at five minutes.  Transferred to  but did not survive due to extreme prematurity.    Condition on Discharge:  Stable    Vital Signs  Temp:  [97.6 °F (36.4 °C)-98.3 °F (36.8 °C)] 98 °F (36.7 °C)  Heart Rate:  [72-86] 78  Resp:  [14-18] 16  BP: ()/(51-68) 113/68    Lab Results   Component Value Date    WBC 7.75 08/10/2024    HGB 8.2 (L) 08/10/2024    HCT 25.0 (L) 08/10/2024    MCV 89.9 08/10/2024     08/10/2024       Discharge Disposition  Home or Self Care    Discharge Medications     Discharge Medications        New Medications        Instructions  Start Date   oxyCODONE 10 MG tablet  Commonly known as: ROXICODONE   5 mg, Oral, Every 4 Hours PRN             Changes to Medications        Instructions Start Date   promethazine 12.5 MG tablet  Commonly known as: PHENERGAN  What changed: Another medication with the same name was removed. Continue taking this medication, and follow the directions you see here.   12.5 mg, Oral, Every 6 Hours PRN             Continue These Medications        Instructions Start Date   famotidine 20 MG tablet  Commonly known as: Pepcid   20 mg, Oral, 2 Times Daily      lamoTRIgine 100 MG tablet  Commonly known as: LaMICtal   100 mg, Oral, Daily      ondansetron 8 MG tablet  Commonly known as: Zofran   8 mg, Oral, Every 8 Hours PRN      pantoprazole 40 MG EC tablet  Commonly known as: Protonix   40 mg, Oral, Daily      sertraline 25 MG tablet  Commonly known as: Zoloft   25 mg, Oral, Daily               Discharge Diet:     Activity at Discharge:   Activity Instructions       Activity as Tolerated      Driving Restrictions      Type of Restriction: Driving    Driving Restrictions: No Driving Until Next Appointment    Other Instructions (Specify)      No driving for 2 weeks, No lifting over 10lbs for 6 weeks.    Pelvic Rest      Pelvic rest including no tampons, no tub baths, and no intercourse until 6 weeks/cleared by Provider.    Sexual Activity Restrictions      Type of Restriction: Sex    Explain Sexual Activity Restrictions: 6 weeks            Follow-up Appointments  Future Appointments   Date Time Provider Department Center   8/21/2024  8:40 AM Grey Stroud MD [facility]   11/27/2024  8:30 AM Rashida Reveles APRN [facility]     Additional Instructions for the Follow-ups that You Need to Schedule       Call MD for problems / concerns.   As directed      Please call with concerns of depression.    Order Comments: Please call with concerns of depression.         Discharge Follow-up with Specialty: Office APRN @ 2  weeks and Primary OB @ 6 weeks; 2 Weeks   As directed      Specialty: Office APRN @ 2 weeks and Primary OB @ 6 weeks   Follow Up: 2 Weeks   Follow Up Details: Please schedule Appointment at 2 weeks with APRN and 6 weeks with Primary OB                Little Cagle MD  08/10/24  11:49 EDT  Csd        Electronically signed by Little Cagle MD at 08/10/24 7693

## 2024-08-13 ENCOUNTER — TELEPHONE (OUTPATIENT)
Dept: GASTROENTEROLOGY | Facility: CLINIC | Age: 34
End: 2024-08-13
Payer: MEDICAID

## 2024-08-13 RX ORDER — DEXLANSOPRAZOLE 60 MG/1
60 CAPSULE, DELAYED RELEASE ORAL DAILY
Qty: 30 CAPSULE | Refills: 5 | Status: SHIPPED | OUTPATIENT
Start: 2024-08-13

## 2024-08-13 NOTE — TELEPHONE ENCOUNTER
Riana Anthony (Key: [id number])  PA Case ID #: [id number]  Rx #: [id number]  Need Help? Call us at [phone]  Outcome  Approved today by [place] [employer] Med[employer]act 2017  The request has been approved. The authorization is effective from 08/13/2024 to 08/13/2025, as long as the member is enrolled in their current health plan. A written notification letter will follow with additional details.  Authorization Expiration Date: 8/12/2025

## 2024-08-13 NOTE — TELEPHONE ENCOUNTER
I TRIED TO CALL [facility] BACK. NO ANSWER LEFT VOICE MESSAGE. DEXILANT HAS BEEN SENT TO PHARMACY.

## 2024-08-13 NOTE — TELEPHONE ENCOUNTER
Dr Larson,    Patient stated she just had a baby( c section). Her acid reflux is worse. Pantoprazole 40 mg once daily isn't working. Patient is wondering if Pantoprazole can be switched to Dexilant 60 mg once daily.

## 2024-08-15 ENCOUNTER — PRIOR AUTHORIZATION (OUTPATIENT)
Dept: GASTROENTEROLOGY | Facility: CLINIC | Age: 34
End: 2024-08-15
Payer: MEDICAID

## 2024-08-15 ENCOUNTER — TELEPHONE (OUTPATIENT)
Dept: GASTROENTEROLOGY | Facility: CLINIC | Age: 34
End: 2024-08-15
Payer: MEDICAID

## 2024-08-15 NOTE — TELEPHONE ENCOUNTER
----- Message from [facility] sent at 8/15/2024 12:45 PM EDT -----  Regarding: Hi  Contact: [phone]  Yes i took it before Dexilant but now they wont give it to me is there anyway you can call [phone] [phone] thats [facility] pharmacy to tell them that ? I totally understand if you cant i just dont know how to solve this matter

## 2024-08-15 NOTE — TELEPHONE ENCOUNTER
From: Riana Anthony  To: Rashida Reveles  Sent: 8/11/2024 10:40 AM EDT  Subject: Hi    Hi rashida i unfortunatly lost the baby at 22 weeks . Anyways i would like a prescription of dexilant because its the only medication that have worked for me in the past. So i would really appreciate you prescriping that and maria c not feeling too well now but i will keep my appoitment for november

## 2024-08-15 NOTE — TELEPHONE ENCOUNTER
Dexilant approved      Riana Penningtonjerome (Pompa: [id number])  PA Case ID #: [id number]  Rx #: [id number]  Need Help? Call us at [phone]  Outcome  Approved today by [place] Medicaid Med[employer]act 2017  The request has been approved. The authorization is effective from 08/13/2024 to 08/13/2025, as long as the member is enrolled in their current health plan. A written notification letter will follow with additional details.  Authorization Expiration Date: 8/12/2025

## 2024-08-22 ENCOUNTER — POSTPARTUM VISIT (OUTPATIENT)
Dept: OBSTETRICS AND GYNECOLOGY | Facility: CLINIC | Age: 34
End: 2024-08-22
Payer: MEDICAID

## 2024-08-22 VITALS — BODY MASS INDEX: 30.41 KG/M2 | HEIGHT: 66 IN | WEIGHT: 189.2 LBS

## 2024-08-22 DIAGNOSIS — Z48.89 ENCOUNTER FOR POST SURGICAL WOUND CHECK: ICD-10-CM

## 2024-08-22 DIAGNOSIS — O09.299 PRIOR PREGNANCY WITH FETAL DEMISE: ICD-10-CM

## 2024-08-22 DIAGNOSIS — D64.9 ANEMIA, UNSPECIFIED TYPE: Primary | ICD-10-CM

## 2024-08-22 RX ORDER — RISPERIDONE 0.5 MG/1
0.5 TABLET ORAL DAILY
COMMUNITY
Start: 2024-08-19

## 2024-08-22 RX ORDER — BUPROPION HYDROCHLORIDE 150 MG/1
150 TABLET ORAL EVERY MORNING
COMMUNITY
Start: 2024-08-12

## 2024-08-22 NOTE — PROGRESS NOTES
Chief Complaint   Patient presents with    Postpartum Care       Postpartum Visit         Riana Anthony is a 34 y.o.  who presents today for a 2 week(s) postpartum check after 22w1d emergent  section due to uterine rupture of previous classical incision.  She required transfusion to secure hemodynamic stability.  Baby was transferred immediately to  NICU but did not survive.     C/S: uterine abruption     , Classical    Information for the patient's :  Ben Anthony [[id number]]   2024   female   Ben Anthony   610 g (1 lb 5.5 oz)   Gestational Age: 22w1d      Baby:  Fetal Demise   Delivering MD: Grey Stroud MD and Raul Alvarado MD.    Pregnancy complications: no known issues    Patient reports her classical incision is clean, dry, intact. Patient describes vaginal bleeding as absent. She would like to discuss the following complaints today: She is wondering if she can have prescribed scar cream and if she can be switched to a female OB provider. She is concerned she is anemic and she is always feeling cold. She is interested in iron infusions due to her not tolerating Iron supplements. PO iron makes her have GI side effects so she is not currently taking anything. She is interested in discussing weight loss as well.     Patient denies concerns for postpartum depression/anxiety. Patient denies  suicidal or homicidal ideation. Her postpartum depression screening questionnaire: 9. Patient is being seen by Psychiatrist (Dr. Mcmahon @ [facility]). She was following with this MD prior to pregnancy for bipolar disorder. She is interested in starting therapy.   The additional following portions of the patient's history were reviewed and updated as appropriate: allergies, current medications, past family history, past medical history, past social history, past surgical history, and problem list.      Review of Systems   HENT: Negative.     Eyes:  "Negative.    Respiratory: Negative.     Cardiovascular: Negative.    Gastrointestinal: Negative.    Endocrine: Positive for cold intolerance.   Genitourinary: Negative.    Musculoskeletal: Negative.    Skin:  Positive for wound.   Allergic/Immunologic: Negative.    Neurological: Negative.    Hematological: Negative.    Psychiatric/Behavioral: Negative.         I have reviewed and agree with the HPI, ROS, and historical information as entered above. Cintia Zuluagaenship, APRN      Objective   Ht 167.6 cm (66\")   Wt 85.8 kg (189 lb 3.2 oz)   LMP 2024 (Approximate)   Breastfeeding No   BMI 30.54 kg/m²     Physical Exam  Vitals and nursing note reviewed.   Constitutional:       Appearance: Normal appearance. She is well-developed.   HENT:      Head: Normocephalic and atraumatic.   Cardiovascular:      Rate and Rhythm: Normal rate and regular rhythm.   Pulmonary:      Effort: Pulmonary effort is normal.      Breath sounds: Normal breath sounds.   Abdominal:      General: A surgical scar is present.      Palpations: Abdomen is soft. Abdomen is not rigid.          Comments: LTI CDI healing well   Musculoskeletal:      Cervical back: Normal range of motion.   Neurological:      Mental Status: She is alert and oriented to person, place, and time.   Psychiatric:         Behavior: Behavior normal.              Assessment and Plan    Problem List Items Addressed This Visit       Anemia - Primary    Relevant Orders    CBC (No Diff)    Prior pregnancy with fetal demise    Overview     24: 22w1d uterine rupture          Other Visit Diagnoses       Postpartum follow-up        Encounter for post surgical wound check                S/p , 2 week(s) postpartum.  Doing as well as can be expected considering recent fetal loss. She is seeing a psychiatrist and is setting up behavioral therapy.   Continued pelvic rest with a return to driving and light physical activity.  Contraception: contraceptive methods: " Abstinence until 6 weeks  Repeat CBC today.  Will discuss weight loss at future appt . Encouraged pt to allow time for weight loss and healing to occur as this can take many weeks or months postpartum.  May use OTC scar cream or silicone pads after 6 weeks PP.  Return in about 4 weeks (around 9/19/2024).    Cintia Davis, APRN  08/22/2024

## 2024-08-23 LAB
ERYTHROCYTE [DISTWIDTH] IN BLOOD BY AUTOMATED COUNT: 13.4 % (ref 11.7–15.4)
HCT VFR BLD AUTO: 37.5 % (ref 34–46.6)
HGB BLD-MCNC: 11.7 G/DL (ref 11.1–15.9)
MCH RBC QN AUTO: 28.3 PG (ref 26.6–33)
MCHC RBC AUTO-ENTMCNC: 31.2 G/DL (ref 31.5–35.7)
MCV RBC AUTO: 91 FL (ref 79–97)
PLATELET # BLD AUTO: 599 X10E3/UL (ref 150–450)
RBC # BLD AUTO: 4.14 X10E6/UL (ref 3.77–5.28)
WBC # BLD AUTO: 7.8 X10E3/UL (ref 3.4–10.8)

## 2024-08-26 ENCOUNTER — TELEPHONE (OUTPATIENT)
Dept: OBSTETRICS AND GYNECOLOGY | Facility: CLINIC | Age: 34
End: 2024-08-26
Payer: MEDICAID

## 2024-08-26 NOTE — TELEPHONE ENCOUNTER
Patient calling to review lab results. Notified pt that provider has not reviewed labs yet and we will call once they are reviewed by provider. Verbalized understanding.

## 2024-08-27 ENCOUNTER — TELEPHONE (OUTPATIENT)
Dept: OBSTETRICS AND GYNECOLOGY | Facility: CLINIC | Age: 34
End: 2024-08-27
Payer: MEDICAID

## 2024-08-27 NOTE — TELEPHONE ENCOUNTER
Fetal demise 24 @ 22w1d. Former [facility] patient. She states she can no longer have children and wishes to switch to a female provider. 2024 Dr Cali  performed emergent  section due to uterine rupture of previous classical incision. She required transfusion to secure hemodynamic stability. Baby was transferred immediately to  NICU but did not survive. Message sent to female MDs to see which provider she can switch to. Patient will be notified when decision is made.

## 2024-08-27 NOTE — TELEPHONE ENCOUNTER
Pt is calling to have results in [facility] discussed with her in detail. Pt is also requesting when she can expect to be transferred to another OBGYN as she no longer wishes to see [facility]. She spoke with Mallory Reyes regarding her issues at her last appt.

## 2024-09-18 ENCOUNTER — OFFICE VISIT (OUTPATIENT)
Dept: OBSTETRICS AND GYNECOLOGY | Facility: CLINIC | Age: 34
End: 2024-09-18
Payer: MEDICAID

## 2024-09-18 VITALS
BODY MASS INDEX: 30.7 KG/M2 | DIASTOLIC BLOOD PRESSURE: 70 MMHG | WEIGHT: 191 LBS | SYSTOLIC BLOOD PRESSURE: 122 MMHG | HEIGHT: 66 IN

## 2024-09-18 RX ORDER — SUCRALFATE 1 G/1
1 TABLET ORAL 4 TIMES DAILY
Qty: 60 TABLET | Refills: 2 | Status: SHIPPED | OUTPATIENT
Start: 2024-09-18

## 2024-09-23 ENCOUNTER — TELEPHONE (OUTPATIENT)
Dept: FAMILY MEDICINE CLINIC | Facility: CLINIC | Age: 34
End: 2024-09-23
Payer: MEDICAID

## 2024-09-25 DIAGNOSIS — Z34.82 MULTIGRAVIDA IN SECOND TRIMESTER: Primary | ICD-10-CM

## 2024-09-25 LAB — REF LAB TEST METHOD: NORMAL

## 2024-09-26 LAB — REF LAB TEST METHOD: NORMAL

## 2024-10-07 ENCOUNTER — LAB (OUTPATIENT)
Dept: LAB | Facility: HOSPITAL | Age: 34
End: 2024-10-07
Payer: MEDICAID

## 2024-10-07 ENCOUNTER — OFFICE VISIT (OUTPATIENT)
Dept: FAMILY MEDICINE CLINIC | Facility: CLINIC | Age: 34
End: 2024-10-07
Payer: MEDICAID

## 2024-10-07 VITALS
HEART RATE: 86 BPM | RESPIRATION RATE: 20 BRPM | HEIGHT: 66 IN | TEMPERATURE: 97.8 F | DIASTOLIC BLOOD PRESSURE: 62 MMHG | WEIGHT: 184.8 LBS | OXYGEN SATURATION: 98 % | SYSTOLIC BLOOD PRESSURE: 100 MMHG | BODY MASS INDEX: 29.7 KG/M2

## 2024-10-07 DIAGNOSIS — Z00.00 ANNUAL PHYSICAL EXAM: ICD-10-CM

## 2024-10-07 DIAGNOSIS — D75.839 THROMBOCYTHEMIA: Primary | ICD-10-CM

## 2024-10-07 DIAGNOSIS — D75.839 THROMBOCYTHEMIA: ICD-10-CM

## 2024-10-07 PROBLEM — E66.3 OVERWEIGHT (BMI 25.0-29.9): Status: ACTIVE | Noted: 2024-10-07

## 2024-10-07 LAB
25(OH)D3 SERPL-MCNC: 24.6 NG/ML (ref 30–100)
CHOLEST SERPL-MCNC: 216 MG/DL (ref 0–200)
DEPRECATED RDW RBC AUTO: 40.3 FL (ref 37–54)
ERYTHROCYTE [DISTWIDTH] IN BLOOD BY AUTOMATED COUNT: 13.1 % (ref 12.3–15.4)
HBA1C MFR BLD: 5.1 % (ref 4.8–5.6)
HCT VFR BLD AUTO: 39.5 % (ref 34–46.6)
HDLC SERPL-MCNC: 58 MG/DL (ref 40–60)
HGB BLD-MCNC: 12.9 G/DL (ref 12–15.9)
LDLC SERPL CALC-MCNC: 147 MG/DL (ref 0–100)
LDLC/HDLC SERPL: 2.5 {RATIO}
MCH RBC QN AUTO: 28 PG (ref 26.6–33)
MCHC RBC AUTO-ENTMCNC: 32.7 G/DL (ref 31.5–35.7)
MCV RBC AUTO: 85.7 FL (ref 79–97)
PLATELET # BLD AUTO: 449 10*3/MM3 (ref 140–450)
PMV BLD AUTO: 10.5 FL (ref 6–12)
RBC # BLD AUTO: 4.61 10*6/MM3 (ref 3.77–5.28)
TRIGL SERPL-MCNC: 65 MG/DL (ref 0–150)
TSH SERPL DL<=0.05 MIU/L-ACNC: 1.49 UIU/ML (ref 0.27–4.2)
VIT B12 BLD-MCNC: 573 PG/ML (ref 211–946)
VLDLC SERPL-MCNC: 11 MG/DL (ref 5–40)
WBC NRBC COR # BLD AUTO: 7.61 10*3/MM3 (ref 3.4–10.8)

## 2024-10-07 PROCEDURE — 2014F MENTAL STATUS ASSESS: CPT | Performed by: STUDENT IN AN ORGANIZED HEALTH CARE EDUCATION/TRAINING PROGRAM

## 2024-10-07 PROCEDURE — 80061 LIPID PANEL: CPT

## 2024-10-07 PROCEDURE — 82306 VITAMIN D 25 HYDROXY: CPT

## 2024-10-07 PROCEDURE — 1126F AMNT PAIN NOTED NONE PRSNT: CPT | Performed by: STUDENT IN AN ORGANIZED HEALTH CARE EDUCATION/TRAINING PROGRAM

## 2024-10-07 PROCEDURE — 82607 VITAMIN B-12: CPT

## 2024-10-07 PROCEDURE — 83036 HEMOGLOBIN GLYCOSYLATED A1C: CPT

## 2024-10-07 PROCEDURE — 99395 PREV VISIT EST AGE 18-39: CPT | Performed by: STUDENT IN AN ORGANIZED HEALTH CARE EDUCATION/TRAINING PROGRAM

## 2024-10-07 PROCEDURE — 85027 COMPLETE CBC AUTOMATED: CPT

## 2024-10-07 PROCEDURE — 84443 ASSAY THYROID STIM HORMONE: CPT

## 2024-10-07 RX ORDER — PANTOPRAZOLE SODIUM 40 MG/1
40 TABLET, DELAYED RELEASE ORAL DAILY
COMMUNITY
Start: 2024-10-03 | End: 2024-10-07

## 2024-10-07 RX ORDER — PANTOPRAZOLE SODIUM 40 MG/1
40 TABLET, DELAYED RELEASE ORAL DAILY
Qty: 90 TABLET | Refills: 3 | Status: SHIPPED | OUTPATIENT
Start: 2024-10-07

## 2024-10-07 RX ORDER — ARIPIPRAZOLE 2 MG/1
2 TABLET ORAL DAILY
COMMUNITY

## 2024-10-07 NOTE — PROGRESS NOTES
"Chief Complaint  Establish Care    History of Present Illness      Annual exam  Pap smear followed by obgyn   Counseled on diet and exercise   Recommend dental and eye exam  hiv hep c sti screening: she is not interested  a1c /lipid screening: she is agreeable  Vaccines recommended:  flu  covid vaccine   Tdap  Hepatitis      She says her therapist and her medications help and she is doing okay even though she had a rough year.       Gerd  She feels the pantoprazole helps. Will refill. She takes this up to four times per day. She does not feel the carafate helped.       The following portions of the patient's history were reviewed and updated as appropriate: allergies, current medications, past family history, past medical history, past social history, past surgical history, and problem list.    OBJECTIVE:  /62   Pulse 86   Temp 97.8 °F (36.6 °C) (Temporal)   Resp 20   Ht 167.6 cm (65.98\")   Wt 83.8 kg (184 lb 12.8 oz)   SpO2 98%   BMI 29.84 kg/m²       Physical Exam  Constitutional:       General: She is not in acute distress.     Appearance: Normal appearance.   HENT:      Head: Normocephalic and atraumatic.   Eyes:      Extraocular Movements: Extraocular movements intact.   Cardiovascular:      Rate and Rhythm: Normal rate and regular rhythm.      Heart sounds: No murmur heard.  Pulmonary:      Effort: Pulmonary effort is normal. No respiratory distress.      Breath sounds: Normal breath sounds. No stridor. No wheezing, rhonchi or rales.   Skin:     Findings: No rash.   Neurological:      General: No focal deficit present.      Mental Status: She is alert.   Psychiatric:         Mood and Affect: Mood normal.                    Assessment and Plan   Diagnoses and all orders for this visit:    1. Thrombocythemia (Primary)  -     CBC (No Diff); Future    2. Annual physical exam  -     Lipid Panel; Future  -     Hemoglobin A1c; Future  -     TSH Rfx On Abnormal To Free T4; Future  -     Vitamin B12; " Future  -     Vitamin D,25-Hydroxy; Future    Other orders  -     pantoprazole (PROTONIX) 40 MG EC tablet; Take 1 tablet by mouth Daily.  Dispense: 90 tablet; Refill: 3      Annual exam  Pap smear followed by obgyn   Counseled on diet and exercise   Recommend dental and eye exam  hiv hep c sti screening: she is not interested  a1c /lipid screening: she is agreeable  Vaccines recommended:  flu  covid vaccine   Tdap  Hepatitis    We discussed surgery for her reflux. She declines. Continue ppi and f.u with GI.         Return in about 1 year (around 10/7/2025) for Annual physical.       Autumn Pederson D.O.  [facility] [place]

## 2024-10-08 ENCOUNTER — TELEPHONE (OUTPATIENT)
Dept: FAMILY MEDICINE CLINIC | Facility: CLINIC | Age: 34
End: 2024-10-08
Payer: MEDICAID

## 2024-10-08 ENCOUNTER — OFFICE VISIT (OUTPATIENT)
Dept: BARIATRICS/WEIGHT MGMT | Facility: CLINIC | Age: 34
End: 2024-10-08
Payer: MEDICAID

## 2024-10-08 VITALS
HEART RATE: 72 BPM | WEIGHT: 182.2 LBS | DIASTOLIC BLOOD PRESSURE: 68 MMHG | BODY MASS INDEX: 29.28 KG/M2 | SYSTOLIC BLOOD PRESSURE: 124 MMHG | OXYGEN SATURATION: 98 % | RESPIRATION RATE: 18 BRPM | HEIGHT: 66 IN

## 2024-10-08 DIAGNOSIS — T88.7XXA SIDE EFFECT OF DRUG: ICD-10-CM

## 2024-10-08 DIAGNOSIS — Z79.899 ENCOUNTER FOR LONG-TERM CURRENT USE OF MEDICATION: ICD-10-CM

## 2024-10-08 DIAGNOSIS — E66.3 OVERWEIGHT (BMI 25.0-29.9): Primary | ICD-10-CM

## 2024-10-08 RX ORDER — SEMAGLUTIDE 0.25 MG/.5ML
0.25 INJECTION, SOLUTION SUBCUTANEOUS WEEKLY
Qty: 2 ML | Refills: 0 | Status: SHIPPED | OUTPATIENT
Start: 2024-10-08

## 2024-10-08 RX ORDER — ONDANSETRON 8 MG/1
8 TABLET, FILM COATED ORAL EVERY 8 HOURS PRN
Qty: 30 TABLET | Refills: 1 | Status: SHIPPED | OUTPATIENT
Start: 2024-10-08 | End: 2024-11-07

## 2024-10-08 NOTE — PROGRESS NOTES
[facility]  [address]  [place] [address]   Office Note      Date: 10/08/2024  Patient Name: Riana Anthony  MRN: [id number]  : 1990    Subjective     Chief Complaint  Obesity Management New Patient          Riana Anthony presents to [facility] for obesity management.  She has a past medical history of bipolar, anemia during pregnancy, fatty liver, GERD, vitamin D deficiency.    Reports being healthy weight around 140 pounds for most of her life until being diagnosed with bipolar and starting medications.  Her highest weight she reached was around 240 pounds.  She has had medications adjustments and desires to lose additional weight with a goal weight of 140 pounds.    He is interested in medication but does have some concern with interactions with her bipolar medicines.  She has downloaded the Wrnch test to And is open to the idea of food journaling.    Highest lifetime weight: 240 pounds. Today's weight is 82.6 kg (182 lb 3.2 oz) pounds.   Weight 5 years ago: 170  The patient is exercising with a FITT score of:    Frequency   Intensity Time Strength Training   []   0 None  []   0 None  []   0 None  [x]   0 None    [x]   1 (1-2x/week) [x]   1 (light) []   1 (<10 min) []   1 (1x/week)   []   2 (3-5x/week) []   2 (moderate) []   2 (10-20 min) []   2 (2x/week)   []   3 (daily)   []   3 (moderately hard)  []   4 (very hard) []   3 (20-30 min)  [x]   4 (>30 min) []   3 (3-4x/week)       The following seem to sabotage weight loss efforts:comfort/stress eating, enjoyment of food, social events, specific cravings like carbohydrates, mindless eating (snacking while working or watching TV), always hungry, boredom eating, portion sizes, and poor sleep    Review of Systems   Constitutional:  Positive for fatigue and unexpected weight gain.        Positive for weight gain   HENT:  Negative for trouble swallowing.         Negative  "for throat swelling   Respiratory:  Negative for shortness of breath and wheezing.         Negative for snoring   Cardiovascular:  Negative for chest pain, palpitations and leg swelling.   Gastrointestinal:  Positive for GERD. Negative for abdominal pain, constipation, diarrhea and indigestion.   Endocrine: Positive for polyphagia. Negative for cold intolerance, heat intolerance, polydipsia and polyuria.        Negative for loss of hair  Negative for hirsutism     Genitourinary:         Denies menstrual irregularities   Musculoskeletal:  Negative for arthralgias.        Denies exercise limitations  Denies chronic pain   Skin:  Negative for dry skin.        Negative for acne   Neurological:  Negative for headache and memory problem.        Negative for paresthesias   Psychiatric/Behavioral:  Positive for depressed mood. Negative for self-injury, sleep disturbance and suicidal ideas. The patient is nervous/anxious.        PHQ-9 Total Score: 1       Objective   Body mass index is 29.41 kg/m².  Body composition analysis completed and showed:   %body fat: 42.3%     Measurements (in inches)  Neck: 12.5  Chest: 38  Waist: 36  Hips: 44  Thighs: 37    Vital Signs:   /68   Pulse 72   Resp 18   Ht 167.6 cm (66\")   Wt 82.6 kg (182 lb 3.2 oz)   SpO2 98%   BMI 29.41 kg/m²       Physical Exam  Constitutional:       Appearance: Normal appearance. She is obese.   Cardiovascular:      Rate and Rhythm: Normal rate and regular rhythm.      Heart sounds: Normal heart sounds.   Pulmonary:      Effort: Pulmonary effort is normal. No respiratory distress.      Breath sounds: Normal breath sounds.   Skin:     General: Skin is warm and dry.   Neurological:      Mental Status: She is alert and oriented to person, place, and time.   Psychiatric:         Attention and Perception: Attention and perception normal.         Mood and Affect: Mood normal.         Speech: Speech normal.         Behavior: Behavior normal.            Result " Review :     Common labs          8/10/2024    04:57 8/10/2024    08:59 8/22/2024    12:38 10/7/2024    10:43   Common Labs   Glucose 66       BUN 6       Creatinine 0.42       Sodium 136       Potassium 3.7       Chloride 105       Calcium 7.6       WBC 8.05  7.75  7.8  7.61    Hemoglobin 7.8  8.2  11.7  12.9    Hematocrit 23.1  25.0  37.5  39.5    Platelets 175  209  599  449    Total Cholesterol    216    Triglycerides    65    HDL Cholesterol    58    LDL Cholesterol     147    Hemoglobin A1C    5.10                  Assessment / Plan       Diagnoses and all orders for this visit:    1. Overweight (BMI 25.0-29.9) (Primary)  Assessment & Plan:  Patient's (Body mass index is 29.41 kg/m².) indicates that they are overweight with health conditions that include dyslipidemias . Weight is newly identified. BMI is above average; BMI management plan is completed. We discussed low calorie, low carb based diet program, portion control, increasing exercise, pharmacologic options including compounded semaglutide, and an yudelka-based approach such as kinkon Pal or Lose It.   -- This is a initial visit and patient self-referred herself.  --Body composition analysis was reviewed in office today and short and long-term weight loss goals set up based on this information.  --Baritastic food journal was set up and reviewed in office today with calorie and macro goals set.  Patient advised that her #1 goal is to start journaling her foods and start learning about what she is eating.  --Patient is interested in pharmacology and we discussed this.  Based on her medication eligibility sheet injectable weight loss medications are not covered.  We discussed that stimulant medications are not the best option based on some of her current medications.  We mutually agreed on compounded semaglutide as this is within cost range.  Compound consent form signed and prescription sent to [facility] pharmacy.  --Recent labs reviewed and  additional labs ordered as well as UDS to be completed in office today.    Orders:  -     Comprehensive Metabolic Panel  -     Insulin, Total  -     Urine Drug Screen - Urine, Clean Catch  -     Semaglutide-Weight Management (Wegovy) 0.25 MG/0.5ML solution auto-injector; Inject 0.5 mL under the skin into the appropriate area as directed 1 (One) Time Per Week.  Dispense: 2 mL; Refill: 0    2. Encounter for long-term current use of medication  -     Urine Drug Screen - Urine, Clean Catch    3. Side effect of drug  -     ondansetron (Zofran) 8 MG tablet; Take 1 tablet by mouth Every 8 (Eight) Hours As Needed for Nausea or Vomiting for up to 30 days.  Dispense: 30 tablet; Refill: 1         Topics of discussion included obesity as a disease, nutritional education on food groups, exercise, and medications. Patient was instructed in adequate protein, controlled carb and controlled fat intake. Patient received instructions on using the medicines as a tool in controlling their weight with nutritional and behavioral changes. Risks and benefits were discussed. I believe the potential benefits of medication helping to decrease weight outweighs the risks. Patient is to try nutritonal/behavioral changes only first. Patient received our clinic education booklet.   Our patient consent form was reviewed including potential risks of weight loss. We also reviewed our confidentiality and HIPPA statements. Patients current FITT score was reviewed along with current capability for exercise tolerance and a patient will work towards a FITT score of: Patient's past medical history was reviewed in detail and barriers to weight loss were identified and discussed. Past efforts at weight reduction on their own as well as under medical provider supervision were documented and discussed.  I advised patient to continue routine care with their Primary Care Provider. Nutritional recommendations and goals were reviewed based on body composition  analysis including basal metabolic rate. Goal set for Calories,  adjusted for exercise calories burnt as well as Macronutrient. Take other medications and supplements as directed. Increase physical activity as tolerated without side effects.     I spent 60 minutes on this date of service. This time includes time spent by me in the following activities:preparing for the visit, counseling and educating the patient/family/caregiver, ordering medications, tests, or procedures and documenting information in the medical record.    Follow Up   Return in about 2 weeks (around 10/22/2024).  Patient was given instructions and counseling regarding her condition or for health maintenance advice. Please see specific information pulled into the AVS if appropriate.     Ana Morgan, APRN  10/08/2024

## 2024-10-08 NOTE — ASSESSMENT & PLAN NOTE
Patient's (Body mass index is 29.41 kg/m².) indicates that they are overweight with health conditions that include dyslipidemias . Weight is newly identified. BMI is above average; BMI management plan is completed. We discussed low calorie, low carb based diet program, portion control, increasing exercise, pharmacologic options including compounded semaglutide, and an yudelka-based approach such as [employer] Pal or Lose It.   -- This is a initial visit and patient self-referred herself.  --Body composition analysis was reviewed in office today and short and long-term weight loss goals set up based on this information.  --Baritastic food journal was set up and reviewed in office today with calorie and macro goals set.  Patient advised that her #1 goal is to start journaling her foods and start learning about what she is eating.  --Patient is interested in pharmacology and we discussed this.  Based on her medication eligibility sheet injectable weight loss medications are not covered.  We discussed that stimulant medications are not the best option based on some of her current medications.  We mutually agreed on compounded semaglutide as this is within cost range.  Compound consent form signed and prescription sent to [facility] pharmacy.  --Recent labs reviewed and additional labs ordered as well as UDS to be completed in office today.

## 2024-10-09 LAB
ALBUMIN SERPL-MCNC: 4.5 G/DL (ref 3.5–5.2)
ALBUMIN/GLOB SERPL: 1.8 G/DL
ALP SERPL-CCNC: 114 U/L (ref 39–117)
ALT SERPL-CCNC: 34 U/L (ref 1–33)
AMPHETAMINES UR QL SCN: NEGATIVE NG/ML
AST SERPL-CCNC: 42 U/L (ref 1–32)
BARBITURATES UR QL SCN: NEGATIVE NG/ML
BENZODIAZ UR QL SCN: NEGATIVE NG/ML
BILIRUB SERPL-MCNC: 0.3 MG/DL (ref 0–1.2)
BUN SERPL-MCNC: 6 MG/DL (ref 6–20)
BUN/CREAT SERPL: 7.7 (ref 7–25)
BZE UR QL SCN: NEGATIVE NG/ML
CALCIUM SERPL-MCNC: 9.9 MG/DL (ref 8.6–10.5)
CANNABINOIDS UR QL SCN: NEGATIVE NG/ML
CHLORIDE SERPL-SCNC: 99 MMOL/L (ref 98–107)
CO2 SERPL-SCNC: 25.9 MMOL/L (ref 22–29)
CREAT SERPL-MCNC: 0.78 MG/DL (ref 0.57–1)
CREAT UR-MCNC: 125.2 MG/DL (ref 20–300)
EGFRCR SERPLBLD CKD-EPI 2021: 103 ML/MIN/1.73
GLOBULIN SER CALC-MCNC: 2.5 GM/DL
GLUCOSE SERPL-MCNC: 81 MG/DL (ref 65–99)
INSULIN SERPL-ACNC: 8.4 UIU/ML (ref 2.6–24.9)
LABORATORY COMMENT REPORT: NORMAL
METHADONE UR QL SCN: NEGATIVE NG/ML
OPIATES UR QL SCN: NEGATIVE NG/ML
OXYCODONE+OXYMORPHONE UR QL SCN: NEGATIVE NG/ML
PCP UR QL: NEGATIVE NG/ML
PH UR: 5.5 [PH] (ref 4.5–8.9)
POTASSIUM SERPL-SCNC: 4.4 MMOL/L (ref 3.5–5.2)
PROPOXYPH UR QL SCN: NEGATIVE NG/ML
PROT SERPL-MCNC: 7 G/DL (ref 6–8.5)
SODIUM SERPL-SCNC: 138 MMOL/L (ref 136–145)

## 2024-10-15 ENCOUNTER — TELEPHONE (OUTPATIENT)
Dept: FAMILY MEDICINE CLINIC | Facility: CLINIC | Age: 34
End: 2024-10-15
Payer: MEDICAID

## 2024-10-15 NOTE — TELEPHONE ENCOUNTER
Caller: Riana Anthony    Relationship: Self    Best call back number: [phone]    What is the best time to reach you: ANY TIME    Who are you requesting to speak with (clinical staff, provider,  specific staff member): NURSE    Do you know the name of the person who called: SELF    What was the call regarding: PATIENT WOULD LIKE TO KNOW IF OFFICE OFFERS THE HPV VACCINE AND HOW SHE CAN GET IT. PLEASE ADVISE AND CALL PATIENT BACK

## 2024-10-18 ENCOUNTER — CLINICAL SUPPORT (OUTPATIENT)
Dept: FAMILY MEDICINE CLINIC | Facility: CLINIC | Age: 34
End: 2024-10-18
Payer: MEDICAID

## 2024-10-18 DIAGNOSIS — Z23 IMMUNIZATION DUE: Primary | ICD-10-CM

## 2024-10-18 PROCEDURE — 90651 9VHPV VACCINE 2/3 DOSE IM: CPT | Performed by: STUDENT IN AN ORGANIZED HEALTH CARE EDUCATION/TRAINING PROGRAM

## 2024-10-18 PROCEDURE — 90471 IMMUNIZATION ADMIN: CPT | Performed by: STUDENT IN AN ORGANIZED HEALTH CARE EDUCATION/TRAINING PROGRAM

## 2024-10-18 NOTE — PROGRESS NOTES
[facility]  [address]  [place] [address]     Office Note      Date: 10/21/2024  Patient Name: Riana Anthony  MRN: [id number]  : 1990    Subjective     Chief Complaint  Obesity Management follow-up    Riana Anthony presents to [facility] for obesity management.     Patient is satisfied with weight loss progress. Appetite is well controlled. Reports no side effects of prescribed medications today. The patient is taking multivitamin and is taking fish oil.  The patient is not using a food journal.  The patient rates current efforts as 9 out of 10.    The patient is exercising with a FITT score of:    Frequency Intensity Time Strength Training   []   0, none []   0 []   0 [x]   0   []   1 (1-2x/week) [x]   1 (light) []   1 (<10 min) []   1 (1x/week)   []   2 (3-5x/week) []   2 (moderate) []   2 (10-20 min) []   2 (2x/week)   [x]   3 (daily) []   3 (moderately hard)  []   4 (very hard) []   3 (20-30 min)  [x]   4 (>30 min) []   3 (3-4x/week)     Review of Systems   Constitutional:  Negative for appetite change and fatigue.   Eyes:  Negative for visual disturbance.   Cardiovascular:  Negative for chest pain and palpitations.   Gastrointestinal:  Negative for constipation and indigestion.   Neurological:  Negative for light-headedness.       Objective   Start weight: 182.3 pounds.    Total Loss lb/%Loss of beginning body weight (BBW): -1.3lb/-0.71%  Change in weight since last visit: -1.3    Recent Weight History:   Wt Readings from Last 6 Encounters:   10/21/24 82.1 kg (181 lb)   10/08/24 82.6 kg (182 lb 3.2 oz)   10/07/24 83.8 kg (184 lb 12.8 oz)   24 86.6 kg (191 lb)   24 85.8 kg (189 lb 3.2 oz)   24 92.5 kg (204 lb)       Body mass index is 29.21 kg/m².   Body composition analysis completed and showed:   Body Fat %: 39.7%    Measurements (in inches)  Waist Circumference: 36    Vital Signs:   /72    "Pulse 76   Resp 18   Ht 167.6 cm (66\")   Wt 82.1 kg (181 lb)   SpO2 98%   BMI 29.21 kg/m²       Physical Exam  Vitals reviewed.   Constitutional:       Appearance: She is well-developed.      Comments: overweight   Pulmonary:      Effort: Pulmonary effort is normal.   Neurological:      Mental Status: She is alert.   Psychiatric:         Attention and Perception: Attention normal.         Mood and Affect: Mood normal.         Speech: Speech normal.         Behavior: Behavior normal.      Result Review :     Common labs          8/22/2024    12:38 10/7/2024    10:43 10/8/2024    11:01   Common Labs   Glucose   81    BUN   6    Creatinine   0.78    Sodium   138    Potassium   4.4    Chloride   99    Calcium   9.9    Total Protein   7.0    Albumin   4.5    Total Bilirubin   0.3    Alkaline Phosphatase   114    AST (SGOT)   42    ALT (SGPT)   34    WBC 7.8  7.61     Hemoglobin 11.7  12.9     Hematocrit 37.5  39.5     Platelets 599  449     Total Cholesterol  216     Triglycerides  65     HDL Cholesterol  58     LDL Cholesterol   147     Hemoglobin A1C  5.10           Assessment / Plan        Diagnoses and all orders for this visit:    1. Overweight (BMI 25.0-29.9) (Primary)  Assessment & Plan:  Patient's (Body mass index is 29.21 kg/m².) indicates that they are overweight with health conditions that include dyslipidemias . Weight is improving with treatment. BMI is above average; BMI management plan is completed. We discussed low calorie, low carb based diet program, portion control, increasing exercise, pharmacologic options including semaglutide, and an yudelka-based approach such as Back& Pal or Lose It.   --This is a follow up visit and she is down a little over 1 lbs  --Increase compounded glutamic from 0.25 to 0.5 mg.  Prescription sent in to pharmacy of her choice.  --Currently not journaling food but focusing on increasing protein and decreasing carbohydrates.  Noted that if weight remains the same without " weight loss the first thing I would recommend is to start journaling food.  --Has increased her mindful movement through walking, keep up this good effort.  --Labs were reviewed and as of note was low vitamin D levels.  She is taking that prescription for that.  Elevated lipid levels.  Advised to follow-up with primary care at next regular appointment.  She also has elevated liver enzymes which are consistent with previous labs.  Continue to follow-up with primary care or other managing provider for this as well.    Orders:  -     Semaglutide-Weight Management (Wegovy) 0.5 MG/0.5ML solution auto-injector; Inject 0.5 mL under the skin into the appropriate area as directed 1 (One) Time Per Week for 30 days.  Dispense: 2 mL; Refill: 0    2. Hyperlipidemia, unspecified hyperlipidemia type    3. Low vitamin D level        We discussed the risks, benefits, and limitations of treatments. Continue medications and OTC supplements as discussed. Patient verbalizes understanding of and agreement with management plan.     Follow Up   Return in about 4 weeks (around 11/18/2024).  Patient was given instructions and counseling regarding her condition or for health maintenance advice. Please see specific information pulled into the AVS if appropriate.     I spent 30 minutes on this date of service. This time includes time spent by me in the following activities:preparing for the visit, counseling and educating the patient/family/caregiver, ordering medications, tests, or procedures and documenting information in the medical record.    Ana Morgan, APRN  10/21/2024

## 2024-10-18 NOTE — LETTER
[facility]  Vaccine Consent Form    Patient Name:  Riana Anthony  Patient :  1990     Vaccine(s) Ordered    HPV Vaccine (HPV9)  HPV Vaccine (HPV9)  HPV Vaccine (HPV9)        Screening Checklist  The following questions should be completed prior to vaccination. If you answer “yes” to any question, it does not necessarily mean you should not be vaccinated. It just means we may need to clarify or ask more questions. If a question is unclear, please ask your healthcare provider to explain it.    Yes No   Any fever or moderate to severe illness today (mild illness and/or antibiotic treatment are not contraindications)?     Do you have a history of a serious reaction to any previous vaccinations, such as anaphylaxis, encephalopathy within 7 days, Guillain-Palo syndrome within 6 weeks, seizure?     Have you received any live vaccine(s) (e.g MMR, ZEINA) or any other vaccines in the last month (to ensure duplicate doses aren't given)?     Do you have an anaphylactic allergy to latex (DTaP, DTaP-IPV, Hep A, Hep B, MenB, RV, Td, Tdap), baker’s yeast (Hep B, HPV), polysorbates (RSV, nirsevimab, PCV 20, Rotavirrus, Tdap, Shingrix), or gelatin (ZEINA, MMR)?     Do you have an anaphylactic allergy to neomycin (Rabies, ZEINA, MMR, IPV, Hep A), polymyxin B (IPV), or streptomycin (IPV)?      Any cancer, leukemia, AIDS, or other immune system disorder? (ZEINA, MMR, RV)     Do you have a parent, brother, or sister with an immune system problem (if immune competence of vaccine recipient clinically verified, can proceed)? (MMR, ZEINA)     Any recent steroid treatments for >2 weeks, chemotherapy, or radiation treatment? (ZEINA, MMR)     Have you received antibody-containing blood transfusions or IVIG in the past 11 months (recommended interval is dependent on product)? (MMR, ZEINA)     Have you taken antiviral drugs (acyclovir, famciclovir, valacyclovir for ZEINA) in the last 24 or 48 hours, respectively?      Are you pregnant or  "planning to become pregnant within 1 month? (ZEINA, MMR, HPV, IPV, MenB, Abrexvy; For Hep B- refer to Engerix-B; For RSV - Abrysvo is indicated for 32-36 weeks of pregnancy from September to January)     For infants, have you ever been told your child has had intussusception or a medical emergency involving obstruction of the intestine (Rotavirus)? If not for an infant, can skip this question.         *Ordering Physicians/APC should be consulted if \"yes\" is checked by the patient or guardian above.  I have received, read, and understand the Vaccine Information Statement (VIS) for each vaccine ordered.  I have considered my or my child's health status as well as the health status of my close contacts.  I have taken the opportunity to discuss my vaccine questions with my or my child's health care provider.   I have requested that the ordered vaccine(s) be given to me or my child.  I understand the benefits and risks of the vaccines.  I understand that I should remain in the clinic for 15 minutes after receiving the vaccine(s).  _________________________________________________________  Signature of Patient or Parent/Legal Guardian ____________________  Date     "

## 2024-10-21 ENCOUNTER — OFFICE VISIT (OUTPATIENT)
Dept: BARIATRICS/WEIGHT MGMT | Facility: CLINIC | Age: 34
End: 2024-10-21
Payer: MEDICAID

## 2024-10-21 VITALS
SYSTOLIC BLOOD PRESSURE: 124 MMHG | HEART RATE: 76 BPM | OXYGEN SATURATION: 98 % | BODY MASS INDEX: 29.09 KG/M2 | WEIGHT: 181 LBS | DIASTOLIC BLOOD PRESSURE: 72 MMHG | RESPIRATION RATE: 18 BRPM | HEIGHT: 66 IN

## 2024-10-21 DIAGNOSIS — R79.89 LOW VITAMIN D LEVEL: ICD-10-CM

## 2024-10-21 DIAGNOSIS — E78.5 HYPERLIPIDEMIA, UNSPECIFIED HYPERLIPIDEMIA TYPE: ICD-10-CM

## 2024-10-21 DIAGNOSIS — E66.3 OVERWEIGHT (BMI 25.0-29.9): Primary | ICD-10-CM

## 2024-10-21 PROBLEM — R74.8 ELEVATED LIVER ENZYMES: Status: ACTIVE | Noted: 2024-10-21

## 2024-10-21 PROCEDURE — 99214 OFFICE O/P EST MOD 30 MIN: CPT | Performed by: NURSE PRACTITIONER

## 2024-10-21 PROCEDURE — 1160F RVW MEDS BY RX/DR IN RCRD: CPT | Performed by: NURSE PRACTITIONER

## 2024-10-21 PROCEDURE — 1159F MED LIST DOCD IN RCRD: CPT | Performed by: NURSE PRACTITIONER

## 2024-10-21 RX ORDER — PANTOPRAZOLE SODIUM 40 MG/1
40 TABLET, DELAYED RELEASE ORAL DAILY
Qty: 90 TABLET | Refills: 3 | Status: SHIPPED | OUTPATIENT
Start: 2024-10-21

## 2024-10-21 RX ORDER — SEMAGLUTIDE 0.5 MG/.5ML
0.5 INJECTION, SOLUTION SUBCUTANEOUS WEEKLY
Qty: 2 ML | Refills: 0 | Status: SHIPPED | OUTPATIENT
Start: 2024-10-21 | End: 2024-11-20

## 2024-10-21 NOTE — ASSESSMENT & PLAN NOTE
Patient's (Body mass index is 29.21 kg/m².) indicates that they are overweight with health conditions that include dyslipidemias . Weight is improving with treatment. BMI is above average; BMI management plan is completed. We discussed low calorie, low carb based diet program, portion control, increasing exercise, pharmacologic options including semaglutide, and an yudelka-based approach such as [employer] Pal or Lose It.   --This is a follow up visit and she is down a little over 1 lbs  --Increase compounded glutamic from 0.25 to 0.5 mg.  Prescription sent in to pharmacy of her choice.  --Currently not journaling food but focusing on increasing protein and decreasing carbohydrates.  Noted that if weight remains the same without weight loss the first thing I would recommend is to start journaling food.  --Has increased her mindful movement through walking, keep up this good effort.  --Labs were reviewed and as of note was low vitamin D levels.  She is taking that prescription for that.  Elevated lipid levels.  Advised to follow-up with primary care at next regular appointment.  She also has elevated liver enzymes which are consistent with previous labs.  Continue to follow-up with primary care or other managing provider for this as well.

## 2024-10-28 ENCOUNTER — PATIENT ROUNDING (BHMG ONLY) (OUTPATIENT)
Dept: BARIATRICS/WEIGHT MGMT | Facility: CLINIC | Age: 34
End: 2024-10-28
Payer: MEDICAID

## 2024-10-28 NOTE — PROGRESS NOTES
A [employer] message has been sent to the patient for PATIENT ROUNDING for [facility].

## 2024-11-05 RX ORDER — PANTOPRAZOLE SODIUM 40 MG/1
40 TABLET, DELAYED RELEASE ORAL 2 TIMES DAILY
Qty: 60 TABLET | Refills: 5 | Status: SHIPPED | OUTPATIENT
Start: 2024-11-05

## 2024-11-25 NOTE — PROGRESS NOTES
[facility]  [address]  [place] [address]     Office Note      Date: 2024  Patient Name: Riana Anthony  MRN: [id number]  : 1990    Subjective     Chief Complaint  Obesity Management follow-up    Riana Anthony presents to [facility] for obesity management.     Patient is satisfied with weight loss progress. Appetite is well controlled. Reports no side effects of prescribed medications today. The patient is taking multivitamin and is taking fish oil.  The patient is not using a food journal.      The patient is exercising with a FITT score of:    Frequency Intensity Time Strength Training   []   0, none []   0 []   0 [x]   0   []   1 (1-2x/week) [x]   1 (light) []   1 (<10 min) []   1 (1x/week)   []   2 (3-5x/week) []   2 (moderate) []   2 (10-20 min) []   2 (2x/week)   [x]   3 (daily) []   3 (moderately hard)  []   4 (very hard) [x]   3 (20-30 min)  []   4 (>30 min) []   3 (3-4x/week)     Review of Systems   Constitutional:  Negative for appetite change and fatigue.   Eyes:  Negative for visual disturbance.   Cardiovascular:  Negative for chest pain and palpitations.   Gastrointestinal:  Negative for constipation and indigestion.   Neurological:  Negative for light-headedness.   All other systems reviewed and are negative.    Objective   Start weight: 182.3 pounds.    Total Loss lb/%Loss of beginning body weight (BBW): -13.9 lb/-7.6 %  Change in weight since last visit: -12.6 lb    Recent Weight History:   Wt Readings from Last 6 Encounters:   24 76.4 kg (168 lb 6.4 oz)   10/21/24 82.1 kg (181 lb)   10/08/24 82.6 kg (182 lb 3.2 oz)   10/07/24 83.8 kg (184 lb 12.8 oz)   24 86.6 kg (191 lb)   24 85.8 kg (189 lb 3.2 oz)     Body mass index is 27.18 kg/m².   Body composition analysis completed and showed:   Body Fat %: 38.8    Measurements (in inches)  Waist Circumference: 36    Vital Signs:   BP  "108/67 (BP Location: Right arm, Patient Position: Sitting, Cuff Size: Adult)   Pulse 72   Ht 167.6 cm (66\")   Wt 76.4 kg (168 lb 6.4 oz)   BMI 27.18 kg/m²       Physical Exam  Vitals reviewed.   Constitutional:       Appearance: She is well-developed.      Comments: overweight   Pulmonary:      Effort: Pulmonary effort is normal.   Neurological:      Mental Status: She is alert.   Psychiatric:         Attention and Perception: Attention normal.         Mood and Affect: Mood normal.         Speech: Speech normal.         Behavior: Behavior normal.        Result Review :     Common labs          8/22/2024    12:38 10/7/2024    10:43 10/8/2024    11:01   Common Labs   Glucose   81    BUN   6    Creatinine   0.78    Sodium   138    Potassium   4.4    Chloride   99    Calcium   9.9    Total Protein   7.0    Albumin   4.5    Total Bilirubin   0.3    Alkaline Phosphatase   114    AST (SGOT)   42    ALT (SGPT)   34    WBC 7.8  7.61     Hemoglobin 11.7  12.9     Hematocrit 37.5  39.5     Platelets 599  449     Total Cholesterol  216     Triglycerides  65     HDL Cholesterol  58     LDL Cholesterol   147     Hemoglobin A1C  5.10                Assessment / Plan        Diagnoses and all orders for this visit:    1. Overweight (BMI 25.0-29.9) (Primary)  Assessment & Plan:  Patient's (Body mass index is 27.18 kg/m².) indicates that they are overweight with health conditions that include dyslipidemias . Weight is improving with treatment. BMI is above average; BMI management plan is completed. We discussed low calorie, low carb based diet program, portion control, increasing exercise, pharmacologic options including compounded semiglutide, and an yudelka-based approach such as [employer] Pal or Lose It.   -- Is a follow-up visit patient is down over 12 pounds since last being seen.  --Currently taking compounded semaglutide 0.5 mg.  She is tolerating this well and okay to increase to 1 mg.  We will likely stay at this dose.  " Advised that the injection instructions may be slightly different into contact pharmacy if have any questions.  --Very close to reaching her first goal which is 10% weight loss from her original start weight.  First goal is 164.  Goal to be there by next office visit.    Orders:  -     Semaglutide-Weight Management (Wegovy) 1 MG/0.5ML solution auto-injector; Inject 0.5 mL under the skin into the appropriate area as directed 1 (One) Time Per Week for 30 days. If there is a break in medication longer than 3 weeks do NOT start at same dose. Contact provider for instructions.  Dispense: 2 mL; Refill: 1        We discussed the risks, benefits, and limitations of treatments. Continue medications and OTC supplements as discussed. Patient verbalizes understanding of and agreement with management plan.     Follow Up   Return in about 4 weeks (around 12/24/2024).  Patient was given instructions and counseling regarding her condition or for health maintenance advice. Please see specific information pulled into the AVS if appropriate.     I spent 40 minutes on this date of service. This time includes time spent by me in the following activities:preparing for the visit, counseling and educating the patient/family/caregiver, ordering medications, tests, or procedures and documenting information in the medical record.    Ana Morgan, APRN  11/26/2024

## 2024-11-26 ENCOUNTER — OFFICE VISIT (OUTPATIENT)
Dept: BARIATRICS/WEIGHT MGMT | Facility: CLINIC | Age: 34
End: 2024-11-26
Payer: MEDICAID

## 2024-11-26 VITALS
SYSTOLIC BLOOD PRESSURE: 108 MMHG | HEIGHT: 66 IN | DIASTOLIC BLOOD PRESSURE: 67 MMHG | BODY MASS INDEX: 27.06 KG/M2 | WEIGHT: 168.4 LBS | HEART RATE: 72 BPM

## 2024-11-26 DIAGNOSIS — E66.3 OVERWEIGHT (BMI 25.0-29.9): Primary | ICD-10-CM

## 2024-11-26 RX ORDER — SEMAGLUTIDE 1 MG/.5ML
1 INJECTION, SOLUTION SUBCUTANEOUS WEEKLY
Qty: 2 ML | Refills: 1 | Status: SHIPPED | OUTPATIENT
Start: 2024-11-26 | End: 2024-12-26

## 2024-11-26 NOTE — PROGRESS NOTES
"     Follow Up      Patient Name: Riana Anthony  : 1990   MRN: [id number]     Chief Complaint:    Chief Complaint   Patient presents with    Follow-up     Acid reflux is getting worst would like to discuss the medication as she is having to take it 4x a day instead of twice.       History of Present Illness: Riana Anthony is a 34 y.o. female who is here today for follow up on GERD and abdominal pain     Riana was last evaluated while 12 weeks pregnant.  She has had terrible reflux for over 12 years.  She has tried \"every single reflux medication\" without improvement.  Recently prescribed Dexilant but this was not effective.  Currently she is taking 4 pantoprazole a day and still symptoms remain uncontrolled.          Abdominal surgical history of abdominoplasty, gastric sleeve, , cholecystectomy, open ventral hernia repair     Subjective      Review of Systems:   Review of Systems   Gastrointestinal:  Positive for GERD and indigestion.       Medications:     Current Outpatient Medications:     pantoprazole (PROTONIX) 40 MG EC tablet, Take 1 tablet by mouth 2 (Two) Times a Day. (Patient taking differently: Take 1 tablet by mouth 4 (Four) Times a Day.), Disp: 60 tablet, Rfl: 5    Abilify 2 MG tablet, Take 1 tablet by mouth Daily., Disp: , Rfl:     buPROPion XL (WELLBUTRIN XL) 150 MG 24 hr tablet, Take 1 tablet by mouth Every Morning., Disp: , Rfl:     lamoTRIgine (LaMICtal) 150 MG tablet, Take 1 tablet by mouth Daily., Disp: , Rfl:     Semaglutide-Weight Management (Wegovy) 1 MG/0.5ML solution auto-injector, Inject 0.5 mL under the skin into the appropriate area as directed 1 (One) Time Per Week for 30 days. If there is a break in medication longer than 3 weeks do NOT start at same dose. Contact provider for instructions., Disp: 2 mL, Rfl: 1    Vonoprazan Fumarate (Voquezna) 20 MG tablet, Take 1 tablet by mouth Daily., Disp: 30 tablet, Rfl: 5  No current facility-administered medications for " this visit.    Facility-Administered Medications Ordered in Other Visits:     dexmedetomidine HCl (PRECEDEX) injection, , Perineural, PRN, Mateusz Davidson CRNA, 50 mcg at 24 1042    Allergies:   Allergies   Allergen Reactions    Pork-Derived Products Anaphylaxis       Social History:   Social History     Socioeconomic History    Marital status:    Tobacco Use    Smoking status: Never    Smokeless tobacco: Never   Vaping Use    Vaping status: Former    Devices: Disposable (pt no longer vapes 21)    Passive vaping exposure: Yes   Substance and Sexual Activity    Alcohol use: Never    Drug use: Never    Sexual activity: Yes     Partners: Male     Birth control/protection: Condom        Surgical History:   Past Surgical History:   Procedure Laterality Date    ABDOMINOPLASTY N/A 2022    Procedure: ABDOMINOPLASTY WITH LIPOSUCTION;  Surgeon: Frederick Betts MD;  Location:  [facility];  Service: Plastics;  Laterality: N/A;    BARIATRIC SURGERY      BREAST AUGMENTATION Bilateral 2022    Procedure: BREAST AUGMENTATION BILATERAL WITH SILICONE IMPLANTS;  Surgeon: Frederick Betts MD;  Location:  [facility];  Service: Plastics;  Laterality: Bilateral;     SECTION N/A 2024    Procedure:  SECTION REPEAT;  Surgeon: Raul Cali MD;  Location:  [facility];  Service: Obstetrics/Gynecology;  Laterality: N/A;     SECTION PRIMARY  2022    CHOLECYSTECTOMY N/A 2021    Procedure: CHOLECYSTECTOMY LAPAROSCOPIC;  Surgeon: Amrita Roche MD;  Location:  [facility];  Service: General;  Laterality: N/A;    GASTRIC SLEEVE LAPAROSCOPIC  20.  [address] [employer].  op note rviewed    HERNIA REPAIR      VENTRAL/INCISIONAL HERNIA REPAIR N/A 2022    Procedure: VENTRAL HERNIA REPAIR OPEN;  Surgeon: Segundo Morales MD;  Location:  [facility];  Service: General;  Laterality: N/A;        Medical History:   Past Medical History:   Diagnosis  "Date    Anemia     Back pain     Binge eating disorder     Bipolar disorder     Cholelithiasis     Clotting disorder     Depression     Fatty liver     Fetal sacrococcygeal teratoma affecting antepartum care of mother 2022    Gallstones     GERD (gastroesophageal reflux disease)     on BID PPI    History of transfusion     PATIENT DENIES REACTION    HL (hearing loss)     both ears, wears hearing aides    HSV-2 infection     last outbreak around     Hyperemesis gravidarum     pt states she always throws up every time she eats. She states she only drinks Gatorade.    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  Follows with psychiatry, on Prozac, previously on Zyprexa.    Recurrent pregnancy loss, antepartum condition or complication         Objective     Physical Exam:  Vital Signs:   Vitals:    24 0804   BP: 126/84   Pulse: 75   Temp: 97.8 °F (36.6 °C)   SpO2: 98%   Weight: 80.1 kg (176 lb 9.6 oz)   Height: 167.6 cm (65.98\")     Body mass index is 28.52 kg/m².     Physical Exam  Constitutional:       General: She is not in acute distress.     Appearance: She is well-developed.   Pulmonary:      Effort: Pulmonary effort is normal. No accessory muscle usage or respiratory distress.   Skin:     Coloration: Skin is not pale.      Findings: No erythema.   Neurological:      Mental Status: She is alert and oriented to person, place, and time.   Psychiatric:         Speech: Speech normal.         Behavior: Behavior normal.         Thought Content: Thought content normal.         Judgment: Judgment normal.         Assessment / Plan      Assessment/Plan:   Diagnoses and all orders for this visit:    1. Erosive esophagitis (Primary)  -     Vonoprazan Fumarate (Voquezna) 20 MG tablet; Take 1 tablet by mouth Daily.  Dispense: 30 tablet; " Refill: 5    2. Gastroesophageal reflux disease, unspecified whether esophagitis present    We did discuss importance of not taking more pantoprazole than prescribed.  My suggestion is to return to bariatric surgery to consider gastric sleeve revision  Trial of Voquezna.  She may take Pepcid as needed and Tums if this is not fully effective.  Cautioned against Wegovy (Will worsen GERD)    Follow Up:  after bariatric surgery appt   No follow-ups on file.    Plan of care reviewed with the patient at the conclusion of today's visit.  Education was provided regarding diagnosis, management, and any prescribed or recommended OTC medications.  Patient verbalized understanding of and agreement with management plan.       CHRIS Lopez  [facility]

## 2024-11-26 NOTE — ASSESSMENT & PLAN NOTE
Patient's (Body mass index is 27.18 kg/m².) indicates that they are overweight with health conditions that include dyslipidemias . Weight is improving with treatment. BMI is above average; BMI management plan is completed. We discussed low calorie, low carb based diet program, portion control, increasing exercise, pharmacologic options including compounded semiglutide, and an yudelka-based approach such as [employer] Pal or Lose It.   -- Is a follow-up visit patient is down over 12 pounds since last being seen.  --Currently taking compounded semaglutide 0.5 mg.  She is tolerating this well and okay to increase to 1 mg.  We will likely stay at this dose.  Advised that the injection instructions may be slightly different into contact pharmacy if have any questions.  --Very close to reaching her first goal which is 10% weight loss from her original start weight.  First goal is 164.  Goal to be there by next office visit.

## 2024-11-27 ENCOUNTER — OFFICE VISIT (OUTPATIENT)
Dept: GASTROENTEROLOGY | Facility: CLINIC | Age: 34
End: 2024-11-27
Payer: MEDICAID

## 2024-11-27 VITALS
TEMPERATURE: 97.8 F | WEIGHT: 176.6 LBS | BODY MASS INDEX: 28.38 KG/M2 | HEART RATE: 75 BPM | HEIGHT: 66 IN | OXYGEN SATURATION: 98 % | DIASTOLIC BLOOD PRESSURE: 84 MMHG | SYSTOLIC BLOOD PRESSURE: 126 MMHG

## 2024-11-27 DIAGNOSIS — K21.9 GASTROESOPHAGEAL REFLUX DISEASE, UNSPECIFIED WHETHER ESOPHAGITIS PRESENT: ICD-10-CM

## 2024-11-27 DIAGNOSIS — K22.10 EROSIVE ESOPHAGITIS: Primary | ICD-10-CM

## 2024-11-27 PROCEDURE — 1160F RVW MEDS BY RX/DR IN RCRD: CPT | Performed by: NURSE PRACTITIONER

## 2024-11-27 PROCEDURE — 99214 OFFICE O/P EST MOD 30 MIN: CPT | Performed by: NURSE PRACTITIONER

## 2024-11-27 PROCEDURE — 1159F MED LIST DOCD IN RCRD: CPT | Performed by: NURSE PRACTITIONER

## 2024-11-27 RX ORDER — VONOPRAZAN FUMARATE 26.72 MG/1
20 TABLET ORAL DAILY
Qty: 30 TABLET | Refills: 5 | Status: SHIPPED | OUTPATIENT
Start: 2024-11-27

## 2024-12-03 ENCOUNTER — PRIOR AUTHORIZATION (OUTPATIENT)
Dept: GASTROENTEROLOGY | Facility: CLINIC | Age: 34
End: 2024-12-03
Payer: MEDICAID

## 2024-12-03 NOTE — TELEPHONE ENCOUNTER
Approved today by [place] [employer] 2017  The request has been approved. The authorization is effective from 11/27/2024 to 01/28/2025, as long as the member is enrolled in their current health plan. A written notification letter will follow with additional details.

## 2024-12-18 ENCOUNTER — CLINICAL SUPPORT (OUTPATIENT)
Dept: FAMILY MEDICINE CLINIC | Facility: CLINIC | Age: 34
End: 2024-12-18
Payer: MEDICAID

## 2024-12-18 DIAGNOSIS — Z23 IMMUNIZATION DUE: ICD-10-CM

## 2024-12-18 PROCEDURE — 90471 IMMUNIZATION ADMIN: CPT | Performed by: STUDENT IN AN ORGANIZED HEALTH CARE EDUCATION/TRAINING PROGRAM

## 2024-12-18 PROCEDURE — 90651 9VHPV VACCINE 2/3 DOSE IM: CPT | Performed by: STUDENT IN AN ORGANIZED HEALTH CARE EDUCATION/TRAINING PROGRAM

## 2024-12-18 NOTE — LETTER
[facility]  Vaccine Consent Form    Patient Name:  Riana Anthony  Patient :  1990     Vaccine(s) Ordered    HPV Vaccine (HPV9)        Screening Checklist  The following questions should be completed prior to vaccination. If you answer “yes” to any question, it does not necessarily mean you should not be vaccinated. It just means we may need to clarify or ask more questions. If a question is unclear, please ask your healthcare provider to explain it.    Yes No   Any fever or moderate to severe illness today (mild illness and/or antibiotic treatment are not contraindications)?     Do you have a history of a serious reaction to any previous vaccinations, such as anaphylaxis, encephalopathy within 7 days, Guillain-Mauston syndrome within 6 weeks, seizure?     Have you received any live vaccine(s) (e.g MMR, ZEINA) or any other vaccines in the last month (to ensure duplicate doses aren't given)?     Do you have an anaphylactic allergy to latex (DTaP, DTaP-IPV, Hep A, Hep B, MenB, RV, Td, Tdap), baker’s yeast (Hep B, HPV), polysorbates (RSV, nirsevimab, PCV 20, Rotavirrus, Tdap, Shingrix), or gelatin (ZEINA, MMR)?     Do you have an anaphylactic allergy to neomycin (Rabies, ZEINA, MMR, IPV, Hep A), polymyxin B (IPV), or streptomycin (IPV)?      Any cancer, leukemia, AIDS, or other immune system disorder? (ZEINA, MMR, RV)     Do you have a parent, brother, or sister with an immune system problem (if immune competence of vaccine recipient clinically verified, can proceed)? (MMR, ZEINA)     Any recent steroid treatments for >2 weeks, chemotherapy, or radiation treatment? (ZEINA, MMR)     Have you received antibody-containing blood transfusions or IVIG in the past 11 months (recommended interval is dependent on product)? (MMR, ZEINA)     Have you taken antiviral drugs (acyclovir, famciclovir, valacyclovir for ZEINA) in the last 24 or 48 hours, respectively?      Are you pregnant or planning to become pregnant within 1 month?  "(ZEINA, MMR, HPV, IPV, MenB, Abrexvy; For Hep B- refer to Engerix-B; For RSV - Abrysvo is indicated for 32-36 weeks of pregnancy from September to January)     For infants, have you ever been told your child has had intussusception or a medical emergency involving obstruction of the intestine (Rotavirus)? If not for an infant, can skip this question.         *Ordering Physicians/APC should be consulted if \"yes\" is checked by the patient or guardian above.  I have received, read, and understand the Vaccine Information Statement (VIS) for each vaccine ordered.  I have considered my or my child's health status as well as the health status of my close contacts.  I have taken the opportunity to discuss my vaccine questions with my or my child's health care provider.   I have requested that the ordered vaccine(s) be given to me or my child.  I understand the benefits and risks of the vaccines.  I understand that I should remain in the clinic for 15 minutes after receiving the vaccine(s).  _________________________________________________________  Signature of Patient or Parent/Legal Guardian ____________________  Date     "

## 2024-12-19 DIAGNOSIS — E66.9 OBESITY, UNSPECIFIED CLASS, UNSPECIFIED OBESITY TYPE, UNSPECIFIED WHETHER SERIOUS COMORBIDITY PRESENT: Primary | ICD-10-CM

## 2025-01-02 ENCOUNTER — LAB (OUTPATIENT)
Dept: LAB | Facility: HOSPITAL | Age: 35
End: 2025-01-02
Payer: MEDICAID

## 2025-01-02 DIAGNOSIS — E66.9 OBESITY, UNSPECIFIED CLASS, UNSPECIFIED OBESITY TYPE, UNSPECIFIED WHETHER SERIOUS COMORBIDITY PRESENT: ICD-10-CM

## 2025-01-02 LAB
ALBUMIN SERPL-MCNC: 4.5 G/DL (ref 3.5–5.2)
ALBUMIN/GLOB SERPL: 1.3 G/DL
ALP SERPL-CCNC: 99 U/L (ref 39–117)
ALT SERPL W P-5'-P-CCNC: 31 U/L (ref 1–33)
ANION GAP SERPL CALCULATED.3IONS-SCNC: 10.8 MMOL/L (ref 5–15)
AST SERPL-CCNC: 24 U/L (ref 1–32)
BILIRUB SERPL-MCNC: 0.4 MG/DL (ref 0–1.2)
BUN SERPL-MCNC: 11 MG/DL (ref 6–20)
BUN/CREAT SERPL: 10.9 (ref 7–25)
CALCIUM SPEC-SCNC: 10.1 MG/DL (ref 8.6–10.5)
CHLORIDE SERPL-SCNC: 100 MMOL/L (ref 98–107)
CO2 SERPL-SCNC: 25.2 MMOL/L (ref 22–29)
CREAT SERPL-MCNC: 1.01 MG/DL (ref 0.57–1)
EGFRCR SERPLBLD CKD-EPI 2021: 75.5 ML/MIN/1.73
GLOBULIN UR ELPH-MCNC: 3.6 GM/DL
GLUCOSE SERPL-MCNC: 85 MG/DL (ref 65–99)
POTASSIUM SERPL-SCNC: 4 MMOL/L (ref 3.5–5.2)
PROT SERPL-MCNC: 8.1 G/DL (ref 6–8.5)
SODIUM SERPL-SCNC: 136 MMOL/L (ref 136–145)

## 2025-01-02 PROCEDURE — 80053 COMPREHEN METABOLIC PANEL: CPT

## 2025-01-02 PROCEDURE — 36415 COLL VENOUS BLD VENIPUNCTURE: CPT

## 2025-01-03 DIAGNOSIS — R79.89 ELEVATED SERUM CREATININE: Primary | ICD-10-CM

## 2025-01-09 NOTE — PROGRESS NOTES
[facility]  [address]  [place] [address]     Office Note      Date: 2025  Patient Name: Riana Anthony  MRN: [id number]  : 1990    Subjective     Chief Complaint  Obesity Management follow-up    Riana Anthony presents to [facility] for obesity management.     Patient is satisfied with weight loss progress. Appetite is well controlled. Reports no side effects of prescribed medications today except mild indigestion. The patient is taking multivitamin and is not taking fish oil.  The patient is not using a food journal.  The patient rates current efforts as 10 out of 10. She is currently taking semaglutide 1mg in compound version.   The patient is exercising with a FITT score of:    Frequency Intensity Time Strength Training   []   0, none []   0 []   0 [x]   0   []   1 (1-2x/week) [x]   1 (light) []   1 (<10 min) []   1 (1x/week)   [x]   2 (3-5x/week) []   2 (moderate) []   2 (10-20 min) []   2 (2x/week)   []   3 (daily) []   3 (moderately hard)  []   4 (very hard) []   3 (20-30 min)  [x]   4 (>30 min) []   3 (3-4x/week)     Review of Systems   Constitutional:  Negative for appetite change and fatigue.   Eyes:  Negative for visual disturbance.   Cardiovascular:  Negative for chest pain and palpitations.   Gastrointestinal:  Positive for indigestion. Negative for constipation.   Neurological:  Negative for light-headedness.   All other systems reviewed and are negative.      Objective   Start weight: 182.3 pounds.    Total Loss lb/%Loss of beginning body weight (BBW): -25.9lb/-14.2%  Change in weight since last visit: -12    Recent Weight History:   Wt Readings from Last 6 Encounters:   25 70.9 kg (156 lb 6.4 oz)   24 80.1 kg (176 lb 9.6 oz)   24 76.4 kg (168 lb 6.4 oz)   10/21/24 82.1 kg (181 lb)   10/08/24 82.6 kg (182 lb 3.2 oz)   10/07/24 83.8 kg (184 lb 12.8 oz)       Body mass index is 25.24  "kg/m².   Body composition analysis completed and showed:   Body Fat %: 34.7    Measurements (in inches)  Waist Circumference: 34    Vital Signs:   /70 (BP Location: Left arm, Patient Position: Sitting)   Pulse 82   Ht 167.6 cm (66\")   Wt 70.9 kg (156 lb 6.4 oz)   BMI 25.24 kg/m²       Physical Exam  Vitals reviewed.   Constitutional:       Appearance: She is well-developed.      Comments: overweight   Pulmonary:      Effort: Pulmonary effort is normal.   Neurological:      Mental Status: She is alert.   Psychiatric:         Attention and Perception: Attention normal.         Mood and Affect: Mood normal.         Speech: Speech normal.         Behavior: Behavior normal.        Result Review :     Common labs          10/7/2024    10:43 10/8/2024    11:01 1/2/2025    10:54   Common Labs   Glucose  81  85    BUN  6  11    Creatinine  0.78  1.01    Sodium  138  136    Potassium  4.4  4.0    Chloride  99  100    Calcium  9.9  10.1    Total Protein  7.0     Albumin  4.5  4.5    Total Bilirubin  0.3  0.4    Alkaline Phosphatase  114  99    AST (SGOT)  42  24    ALT (SGPT)  34  31    WBC 7.61      Hemoglobin 12.9      Hematocrit 39.5      Platelets 449      Total Cholesterol 216      Triglycerides 65      HDL Cholesterol 58      LDL Cholesterol  147      Hemoglobin A1C 5.10                 Assessment / Plan        Diagnoses and all orders for this visit:    1. Overweight (BMI 25.0-29.9) (Primary)  Assessment & Plan:  Patient's (Body mass index is 25.24 kg/m².) indicates that they are overweight with health conditions that include dyslipidemias . Weight is improving with treatment. BMI is above average; BMI management plan is completed. We discussed low calorie, low carb based diet program, portion control, increasing exercise, pharmacologic options including compounded semaglutide, and an yudelka-based approach such as [employer] Pal or Lose It.   -- This is a follow-up visit patient is down around 12 pounds since last " being seen.  --He is currently taking compounded semaglutide 1 mg.  Related to some mild indigestion we are going to continue at the same dose likely not needing to increase before reaching her final goal.  --Did briefly talk about goals and expectations once we reach our goal weight.  Patient's goal weight is 140 pounds which is appropriate and within a healthy fat range for someone her age and sex.  --Due to winter weather her exercise has been decreased recently and she is setting a goal to get that back on track and increase this month.  She enjoys walking.    Orders:  -     Semaglutide-Weight Management (Wegovy) 1 MG/0.5ML solution auto-injector; Inject 0.5 mL under the skin into the appropriate area as directed 1 (One) Time Per Week for 30 days. If there is a break in medication longer than 3 weeks do NOT start at same dose. Contact provider for instructions.  Dispense: 2 mL; Refill: 1    We discussed the risks, benefits, and limitations of treatments. Continue medications and OTC supplements as discussed. Patient verbalizes understanding of and agreement with management plan.     Follow Up   Return in about 4 weeks (around 2/11/2025).  Patient was given instructions and counseling regarding her condition or for health maintenance advice. Please see specific information pulled into the AVS if appropriate.     I spent 40 minutes on this date of service. This time includes time spent by me in the following activities:preparing for the visit, counseling and educating the patient/family/caregiver, ordering medications, tests, or procedures and documenting information in the medical record.    Ana Morgan, APRN  01/14/2025

## 2025-01-14 ENCOUNTER — OFFICE VISIT (OUTPATIENT)
Dept: BARIATRICS/WEIGHT MGMT | Facility: CLINIC | Age: 35
End: 2025-01-14
Payer: MEDICAID

## 2025-01-14 VITALS
DIASTOLIC BLOOD PRESSURE: 70 MMHG | HEIGHT: 66 IN | HEART RATE: 82 BPM | BODY MASS INDEX: 25.13 KG/M2 | SYSTOLIC BLOOD PRESSURE: 112 MMHG | WEIGHT: 156.4 LBS

## 2025-01-14 DIAGNOSIS — E66.3 OVERWEIGHT (BMI 25.0-29.9): Primary | ICD-10-CM

## 2025-01-14 RX ORDER — SEMAGLUTIDE 1.7 MG/.75ML
1.7 INJECTION, SOLUTION SUBCUTANEOUS WEEKLY
Qty: 3 ML | Refills: 1 | Status: CANCELLED | OUTPATIENT
Start: 2025-01-14 | End: 2025-02-13

## 2025-01-14 RX ORDER — SEMAGLUTIDE 1 MG/.5ML
1 INJECTION, SOLUTION SUBCUTANEOUS WEEKLY
Qty: 2 ML | Refills: 1 | Status: SHIPPED | OUTPATIENT
Start: 2025-01-14 | End: 2025-02-13

## 2025-01-14 NOTE — ASSESSMENT & PLAN NOTE
Patient's (Body mass index is 25.24 kg/m².) indicates that they are overweight with health conditions that include dyslipidemias . Weight is improving with treatment. BMI is above average; BMI management plan is completed. We discussed low calorie, low carb based diet program, portion control, increasing exercise, pharmacologic options including compounded semaglutide, and an yudelka-based approach such as "[employer] - The Simple, Fast and Elegant Tablet [employer] App" Pal or Lose It.   -- This is a follow-up visit patient is down around 12 pounds since last being seen.  --He is currently taking compounded semaglutide 1 mg.  Related to some mild indigestion we are going to continue at the same dose likely not needing to increase before reaching her final goal.  --Did briefly talk about goals and expectations once we reach our goal weight.  Patient's goal weight is 140 pounds which is appropriate and within a healthy fat range for someone her age and sex.  --Due to winter weather her exercise has been decreased recently and she is setting a goal to get that back on track and increase this month.  She enjoys walking.

## 2025-01-28 ENCOUNTER — LAB (OUTPATIENT)
Dept: LAB | Facility: HOSPITAL | Age: 35
End: 2025-01-28
Payer: MEDICAID

## 2025-01-28 DIAGNOSIS — R79.89 ELEVATED SERUM CREATININE: ICD-10-CM

## 2025-01-28 LAB
ANION GAP SERPL CALCULATED.3IONS-SCNC: 13.5 MMOL/L (ref 5–15)
BUN SERPL-MCNC: 10 MG/DL (ref 6–20)
BUN/CREAT SERPL: 12 (ref 7–25)
CALCIUM SPEC-SCNC: 9.9 MG/DL (ref 8.6–10.5)
CHLORIDE SERPL-SCNC: 104 MMOL/L (ref 98–107)
CO2 SERPL-SCNC: 22.5 MMOL/L (ref 22–29)
CREAT SERPL-MCNC: 0.83 MG/DL (ref 0.57–1)
EGFRCR SERPLBLD CKD-EPI 2021: 95.6 ML/MIN/1.73
GLUCOSE SERPL-MCNC: 76 MG/DL (ref 65–99)
POTASSIUM SERPL-SCNC: 4.3 MMOL/L (ref 3.5–5.2)
SODIUM SERPL-SCNC: 140 MMOL/L (ref 136–145)

## 2025-01-28 PROCEDURE — 36415 COLL VENOUS BLD VENIPUNCTURE: CPT

## 2025-01-28 PROCEDURE — 80048 BASIC METABOLIC PNL TOTAL CA: CPT

## 2025-02-11 ENCOUNTER — PATIENT MESSAGE (OUTPATIENT)
Dept: BARIATRICS/WEIGHT MGMT | Facility: CLINIC | Age: 35
End: 2025-02-11
Payer: MEDICAID

## 2025-02-12 DIAGNOSIS — E66.3 OVERWEIGHT (BMI 25.0-29.9): ICD-10-CM

## 2025-02-12 RX ORDER — SEMAGLUTIDE 1 MG/.5ML
1 INJECTION, SOLUTION SUBCUTANEOUS WEEKLY
Qty: 2 ML | Refills: 1 | Status: SHIPPED | OUTPATIENT
Start: 2025-02-12 | End: 2025-02-13

## 2025-02-12 NOTE — TELEPHONE ENCOUNTER
Rx Refill Note  Requested Prescriptions     Pending Prescriptions Disp Refills    Semaglutide-Weight Management (Wegovy) 1 MG/0.5ML solution auto-injector 2 mL 1     Sig: Inject 0.5 mL under the skin into the appropriate area as directed 1 (One) Time Per Week for 30 days. If there is a break in medication longer than 3 weeks do NOT start at same dose. Contact provider for instructions.      Last office visit with prescribing clinician: 1/14/2025   Last telemedicine visit with prescribing clinician: Visit date not found   Next office visit with prescribing clinician: 2/24/2025                         Would you like a call back once the refill request has been completed: [] Yes [] No    If the office needs to give you a call back, can they leave a voicemail: [] Yes [] No    Amy Stuart MA  02/12/25, 10:43 EST

## 2025-02-13 ENCOUNTER — OFFICE VISIT (OUTPATIENT)
Dept: BARIATRICS/WEIGHT MGMT | Facility: CLINIC | Age: 35
End: 2025-02-13
Payer: MEDICAID

## 2025-02-13 VITALS
HEART RATE: 84 BPM | WEIGHT: 154.4 LBS | OXYGEN SATURATION: 98 % | BODY MASS INDEX: 24.81 KG/M2 | RESPIRATION RATE: 18 BRPM | SYSTOLIC BLOOD PRESSURE: 118 MMHG | HEIGHT: 66 IN | DIASTOLIC BLOOD PRESSURE: 66 MMHG

## 2025-02-13 DIAGNOSIS — E66.3 OVERWEIGHT (BMI 25.0-29.9): Primary | ICD-10-CM

## 2025-02-13 RX ORDER — SEMAGLUTIDE 1.7 MG/.75ML
1.7 INJECTION, SOLUTION SUBCUTANEOUS WEEKLY
Qty: 3 ML | Refills: 1 | Status: SHIPPED | OUTPATIENT
Start: 2025-02-13 | End: 2025-03-15

## 2025-02-13 NOTE — ASSESSMENT & PLAN NOTE
Patient's (Body mass index is 24.92 kg/m².) indicates that they are overweight with health conditions that include dyslipidemias . Weight is improving with treatment. BMI is above average; BMI management plan is completed. We discussed low calorie, low carb based diet program, portion control, increasing exercise, pharmacologic options including Wegovy (compounded) , and an yudelka-based approach such as [employer] Pal or Lose It.   -- This is a follow-up visit patient is down around 2 pounds since last being seen  --Taking compounded semaglutide with B12.  She does have some concern that the added B12 may be causing anxiety.  Advised this is not common.  Continue to monitor symptoms and if they worsen or do not improve to discontinue the medication.  We did discuss alternatives that do not include the B12 such as buying self-pay branded Wegovy directly from the pharmacy.  She reports ports that she desires to continue compounded semaglutide and will continue to monitor symptoms.  --Body composition analysis was reviewed.  BMI has dropped below 25 but her fat percentile and mass remains elevated.  Goal to get back percentage between 21 and 32.9% and fat mass between 26.6 and 49.2 pounds.  --Currently is walking every morning and encouraged her to increase this to twice a day which can be helpful for reduction of anxiety.

## 2025-02-13 NOTE — PROGRESS NOTES
[facility]  [address]  [place] [address]     Office Note      Date: 2025  Patient Name: Riana Anthony  MRN: [id number]  : 1990    Subjective     Chief Complaint  Obesity Management follow-up    Riana Anthony presents to [facility] for obesity management.       Patient is unsure with weight loss progress. Appetite is well controlled. Reports no side effects of prescribed medications today. The patient is not taking multivitamin and is not taking fish oil.  The patient is not using a food journal.  The patient rates current efforts as 9 out of 10. She reports that she has mild anxiety and concerned it may be from the B12 added to compounded semaglutide  but uncertain. She would like to discuss options.     The patient is exercising with a FITT score of:    Frequency Intensity Time Strength Training   []   0, none []   0 []   0 [x]   0   []   1 (1-2x/week) []   1 (light) []   1 (<10 min) []   1 (1x/week)   [x]   2 (3-5x/week) [x]   2 (moderate) []   2 (10-20 min) []   2 (2x/week)   []   3 (daily) []   3 (moderately hard)  []   4 (very hard) []   3 (20-30 min)  [x]   4 (>30 min) []   3 (3-4x/week)     Review of Systems   Constitutional:  Negative for appetite change and fatigue.   Eyes:  Negative for visual disturbance.   Cardiovascular:  Negative for chest pain and palpitations.   Gastrointestinal:  Negative for constipation and indigestion.   Neurological:  Negative for light-headedness.   Psychiatric/Behavioral:  The patient is nervous/anxious.        Objective   Start weight: 182.3 pounds.    Total Loss lb/%Loss of beginning body weight (BBW): -27.9lb/-15.30%  Change in weight since last visit: -2.2    Recent Weight History:   Wt Readings from Last 6 Encounters:   25 70 kg (154 lb 6.4 oz)   25 70.9 kg (156 lb 6.4 oz)   24 80.1 kg (176 lb 9.6 oz)   24 76.4 kg (168 lb 6.4 oz)   10/21/24 82.1  "kg (181 lb)   10/08/24 82.6 kg (182 lb 3.2 oz)     Body mass index is 24.92 kg/m².   Body composition analysis completed and showed:   Body Fat %: 35.1%    Measurements (in inches)  Waist Circumference: 33    Vital Signs:   /66   Pulse 84   Resp 18   Ht 167.6 cm (66\")   Wt 70 kg (154 lb 6.4 oz)   SpO2 98%   BMI 24.92 kg/m²       Physical Exam  Vitals reviewed.   Constitutional:       Appearance: She is well-developed.      Comments: overweight   Pulmonary:      Effort: Pulmonary effort is normal.   Neurological:      Mental Status: She is alert.   Psychiatric:         Attention and Perception: Attention normal.         Mood and Affect: Mood normal.         Speech: Speech normal.         Behavior: Behavior normal.        Result Review :     Common labs          10/8/2024    11:01 1/2/2025    10:54 1/28/2025    08:45   Common Labs   Glucose 81  85  76    BUN 6  11  10    Creatinine 0.78  1.01  0.83    Sodium 138  136  140    Potassium 4.4  4.0  4.3    Chloride 99  100  104    Calcium 9.9  10.1  9.9    Albumin 4.5  4.5     Total Bilirubin 0.3  0.4     Alkaline Phosphatase 114  99     AST (SGOT) 42  24     ALT (SGPT) 34  31           Assessment / Plan        Diagnoses and all orders for this visit:    1. Overweight (BMI 25.0-29.9) (Primary)  Assessment & Plan:  Patient's (Body mass index is 24.92 kg/m².) indicates that they are overweight with health conditions that include dyslipidemias . Weight is improving with treatment. BMI is above average; BMI management plan is completed. We discussed low calorie, low carb based diet program, portion control, increasing exercise, pharmacologic options including Wegovy (compounded) , and an yudelka-based approach such as "[employer] Pal or Lose It.   -- This is a follow-up visit patient is down around 2 pounds since last being seen  --Taking compounded semaglutide with B12.  She does have some concern that the added B12 may be causing anxiety.  Advised this is not common.  " Continue to monitor symptoms and if they worsen or do not improve to discontinue the medication.  We did discuss alternatives that do not include the B12 such as buying self-pay branded Wegovy directly from the pharmacy.  She reports ports that she desires to continue compounded semaglutide and will continue to monitor symptoms.  --Body composition analysis was reviewed.  BMI has dropped below 25 but her fat percentile and mass remains elevated.  Goal to get back percentage between 21 and 32.9% and fat mass between 26.6 and 49.2 pounds.  --Currently is walking every morning and encouraged her to increase this to twice a day which can be helpful for reduction of anxiety.    Orders:  -     Semaglutide-Weight Management (Wegovy) 1.7 MG/0.75ML solution auto-injector; Inject 0.75 mL under the skin into the appropriate area as directed 1 (One) Time Per Week for 30 days. If there is a break in medication for 3 weeks or longer. Do NOT restart at same dose. Contact provider for instructions.  Dispense: 3 mL; Refill: 1        We discussed the risks, benefits, and limitations of treatments. Continue medications and OTC supplements as discussed. Patient verbalizes understanding of and agreement with management plan.     Follow Up   Return in about 4 weeks (around 3/13/2025).  Patient was given instructions and counseling regarding her condition or for health maintenance advice. Please see specific information pulled into the AVS if appropriate.     I spent 40 minutes on this date of service. This time includes time spent by me in the following activities:preparing for the visit, counseling and educating the patient/family/caregiver, ordering medications, tests, or procedures and documenting information in the medical record.    Ana Morgan, APRN  02/13/2025

## 2025-02-20 PROBLEM — Z76.89 ENCOUNTER FOR WEIGHT MANAGEMENT: Status: ACTIVE | Noted: 2024-10-07

## 2025-03-12 ENCOUNTER — OFFICE VISIT (OUTPATIENT)
Dept: OBSTETRICS AND GYNECOLOGY | Facility: CLINIC | Age: 35
End: 2025-03-12
Payer: MEDICAID

## 2025-03-12 VITALS
HEIGHT: 66 IN | BODY MASS INDEX: 25.13 KG/M2 | WEIGHT: 156.4 LBS | DIASTOLIC BLOOD PRESSURE: 68 MMHG | SYSTOLIC BLOOD PRESSURE: 102 MMHG

## 2025-03-12 DIAGNOSIS — B37.31 RECURRENT CANDIDIASIS OF VAGINA: Primary | ICD-10-CM

## 2025-03-12 RX ORDER — BORIC ACID
600 POWDER (GRAM) MISCELLANEOUS 3 TIMES WEEKLY
Qty: 36 SUPPOSITORY | Refills: 3 | Status: SHIPPED | OUTPATIENT
Start: 2025-03-12

## 2025-03-12 RX ORDER — FLUCONAZOLE 150 MG/1
TABLET ORAL
Qty: 3 TABLET | Refills: 0 | Status: SHIPPED | OUTPATIENT
Start: 2025-03-12

## 2025-03-12 NOTE — PROGRESS NOTES
Chief Complaint   Patient presents with    Vaginitis         Subjective   HPI  Riana Anthony is a 34 y.o. female, , who presents for evaluation of burning, discharge, and vulvar itching. The discharge is white and thin.  Her symptoms have been present for 3 day(s). Denies dysuria.     Prior to the onset of symptoms she was not on antibiotics.  She has recently changed soaps/detergents/toilet tissue.  She changed feminine wash, shaving gel, and stopped used boric acid due to running out. Prior to this visit, she has used one dose boric acid and anti-itch ointment in an attempt to improve her symptoms.    Reports having chronic, recurrent yeast infections. Requesting prescription for boric acid suppositories.      Sexual History    She is currently sexually active. In the past year there has been NO new sexual partners. Condoms are always used.  She would not like to be screened for STD's at today's exam.    Current birth control method: condoms.    Menstrual History:    Patient's last menstrual period was 2025 (approximate).    In the past 6 months her cycles have been regular, predictable and occur monthly.   Her menstrual flow is typically normal. Intermenstrual bleeding is absent.  Post-coital bleeding is absent.      Additional OB/GYN History   Last Pap : 24 Negative  Last Completed Pap Smear            Upcoming       PAP SMEAR (Yearly) Next due on 2024  HPV GENOTYPING, [facility]) - , Cervix, Endocervix    2024  LIQUID-BASED PAP SMEAR, [facility] ([facility])    2014  SCANNED - PAP SMEAR                            OB History          6    Para   5    Term   3       2    AB   1    Living   3         SAB   1    IAB   0    Ectopic   0    Molar   0    Multiple   0    Live Births   5          Obstetric Comments   FOB #1 : Pregnancy # 1-6               The additional following portions of the patient's history were reviewed and  "updated as appropriate: allergies, current medications, past family history, past medical history, past social history, past surgical history, and problem list.    Review of Systems   Respiratory: Negative.  Negative for shortness of breath.    Cardiovascular: Negative.  Negative for chest pain.   Gastrointestinal: Negative.  Negative for abdominal distention, abdominal pain and constipation.   Genitourinary:  Positive for vaginal discharge. Negative for dyspareunia, dysuria, menstrual problem, pelvic pain, pelvic pressure, urinary incontinence, vaginal bleeding and vaginal pain.        Vaginal itching, vaginal burning     All other systems reviewed and are negative.     I have reviewed and agree with the HPI, ROS, and historical information as entered above. Lizett BUNNY Triplett, APRN      Objective   /68   Ht 167.6 cm (66\")   Wt 70.9 kg (156 lb 6.4 oz)   LMP 02/26/2025 (Approximate)   BMI 25.24 kg/m²     Physical Exam  Vitals reviewed. Exam conducted with a chaperone present.   Constitutional:       General: She is not in acute distress.     Appearance: Normal appearance. She is not ill-appearing or toxic-appearing.   HENT:      Head: Normocephalic and atraumatic.   Pulmonary:      Effort: Pulmonary effort is normal.   Abdominal:      Palpations: Abdomen is soft. There is no mass.      Tenderness: There is no abdominal tenderness.      Hernia: No hernia is present.   Genitourinary:     General: Normal vulva.      Labia:         Right: No rash, tenderness, lesion or injury.         Left: No rash, tenderness, lesion or injury.       Vagina: No signs of injury. Vaginal discharge present. No erythema, tenderness, bleeding or lesions.      Cervix: Discharge present. No cervical motion tenderness, friability, lesion, erythema or cervical bleeding.      Uterus: Normal. Not enlarged and not tender.       Adnexa: Right adnexa normal and left adnexa normal.        Right: No mass or tenderness.          Left: No mass " or tenderness.        Comments: White thick creamy d/c and watery cloudy d/c noted.   Neurological:      Mental Status: She is alert and oriented to person, place, and time.   Psychiatric:         Mood and Affect: Mood normal.         Behavior: Behavior normal.         Assessment & Plan     Assessment and Plan    Problem List Items Addressed This Visit    None  Visit Diagnoses         Recurrent candidiasis of vagina    -  Primary    Relevant Medications    Boric Acid suppository Suppository    fluconazole (Diflucan) 150 MG tablet    Other Relevant Orders    NuSwab VG, Candida 6sp - Swab, Vagina          Recurrent Vaginitis     Plan:     NuSwab ordered- BV and yeast  Diflucan and Boric acid prescribed. Will treat further if indicated.   Counseling on Vaginitis provided  Advised to abstain from coitus during course of Diflucan treatment.  Vaginitis, Boric acid, and Diflucan education included in patient instructions.  Return in about 6 months (around 9/25/2025) for Annual physical or sooner if needed.           Lizett Triplett, APRN  03/12/2025

## 2025-03-14 ENCOUNTER — TELEPHONE (OUTPATIENT)
Dept: OBSTETRICS AND GYNECOLOGY | Facility: CLINIC | Age: 35
End: 2025-03-14

## 2025-03-14 LAB
A VAGINAE DNA VAG QL NAA+PROBE: NORMAL SCORE
BVAB2 DNA VAG QL NAA+PROBE: NORMAL SCORE
C ALBICANS DNA VAG QL NAA+PROBE: NEGATIVE
C GLABRATA DNA VAG QL NAA+PROBE: NEGATIVE
C KRUSEI DNA VAG QL NAA+PROBE: NEGATIVE
C LUSITANIAE DNA VAG QL NAA+PROBE: NEGATIVE
CANDIDA DNA VAG QL NAA+PROBE: NEGATIVE
MEGA1 DNA VAG QL NAA+PROBE: NORMAL SCORE
T VAGINALIS DNA VAG QL NAA+PROBE: NEGATIVE

## 2025-03-14 NOTE — TELEPHONE ENCOUNTER
VERNON NAVA    [phone]    PT WOULD LIKE TO BE SEEN TODAY FOR SCREENING/TESTING TODAY     HAVING SYMPTOMS    OPEN TO SEE ANY FEMALE PROVIDER

## 2025-03-17 ENCOUNTER — OFFICE VISIT (OUTPATIENT)
Dept: OBSTETRICS AND GYNECOLOGY | Facility: CLINIC | Age: 35
End: 2025-03-17
Payer: MEDICAID

## 2025-03-17 VITALS
DIASTOLIC BLOOD PRESSURE: 60 MMHG | BODY MASS INDEX: 23.91 KG/M2 | HEIGHT: 66 IN | SYSTOLIC BLOOD PRESSURE: 102 MMHG | WEIGHT: 148.8 LBS

## 2025-03-17 DIAGNOSIS — Z20.2 POSSIBLE EXPOSURE TO STD: Primary | ICD-10-CM

## 2025-03-17 DIAGNOSIS — N76.1 SUBACUTE VAGINITIS: ICD-10-CM

## 2025-03-17 DIAGNOSIS — A60.04 HERPETIC VULVOVAGINITIS: ICD-10-CM

## 2025-03-17 RX ORDER — VALACYCLOVIR HYDROCHLORIDE 1 G/1
1000 TABLET, FILM COATED ORAL DAILY
Qty: 5 TABLET | Refills: 0 | Status: SHIPPED | OUTPATIENT
Start: 2025-03-17 | End: 2025-03-22

## 2025-03-17 NOTE — PROGRESS NOTES
Chief Complaint   Patient presents with    Follow-up         Subjective   HPI  Riana Anthony is a 34 y.o. female, , who presents for screening for STD's.     Patient's last menstrual period was 2025 (approximate).. She reports denies knowledge of risky exposure. She was seen in office 3/12/2025. At that time she reported vaginal burning, discharge, and vulvar itching. The discharge is white and thin. Her symptoms had been present for 3 day(s). She  admitted to changing feminine wash and shaving gel,. She also reported chronic, recurrent yeast infections. She had recently stopped using boric acid because she ran out.     Diflucan and boric acid was prescribed. She declined STI testing. Nuswab was negative for BV and yeast. Today the patient reports vaginal itching, swelling, and burning. She also reports pain in umbilicus area.     These symptoms have continued. She is requesting STD testing with blood work. The patient reports a past history of: herpes and HPV. She has not taken HSV medication since pregnancy.       Additional OB/GYN History   Last Pap : 2024 Negative 2024-HPV POOL + ; HPV 16/18/45 Negative  Last Completed Pap Smear            Upcoming       PAP SMEAR (Yearly) Next due on 2024  HPV GENOTYPING, [facility]) - , Cervix, Endocervix    2024  LIQUID-BASED PAP SMEAR, [facility] ([facility])    2014  SCANNED - PAP SMEAR                          History of abnormal Pap smear: yes - 2024  OB History          6    Para   5    Term   3       2    AB   1    Living   3         SAB   1    IAB   0    Ectopic   0    Molar   0    Multiple   0    Live Births   5          Obstetric Comments   FOB #1 : Pregnancy # 1-6                 Current Outpatient Medications:     Abilify 2 MG tablet, Take 1 tablet by mouth Daily., Disp: , Rfl:     Boric Acid suppository Suppository, Insert 1 suppository into the vagina 3 (Three)  Times a Week., Disp: 36 suppository, Rfl: 3    buPROPion XL (WELLBUTRIN XL) 150 MG 24 hr tablet, Take 1 tablet by mouth Every Morning., Disp: , Rfl:     fluconazole (Diflucan) 150 MG tablet, Take one tablet by mouth today and repeat every 3 days x 3 doses, Disp: 3 tablet, Rfl: 0    lamoTRIgine (LaMICtal) 150 MG tablet, Take 1 tablet by mouth Daily., Disp: , Rfl:     pantoprazole (PROTONIX) 40 MG EC tablet, Take 1 tablet by mouth 2 (Two) Times a Day. (Patient not taking: Reported on 3/12/2025), Disp: 60 tablet, Rfl: 5    Semaglutide-Weight Management (Wegovy) 1.7 MG/0.75ML solution auto-injector, Inject 0.75 mL under the skin into the appropriate area as directed 1 (One) Time Per Week for 30 days. If there is a break in medication for 3 weeks or longer. Do NOT restart at same dose. Contact provider for instructions., Disp: 3 mL, Rfl: 1    valACYclovir (Valtrex) 1000 MG tablet, Take 1 tablet by mouth Daily for 5 days., Disp: 5 tablet, Rfl: 0    Vonoprazan Fumarate (Voquezna) 20 MG tablet, Take 1 tablet by mouth Daily. (Patient not taking: Reported on 3/12/2025), Disp: 30 tablet, Rfl: 5  No current facility-administered medications for this visit.    Facility-Administered Medications Ordered in Other Visits:     dexmedetomidine HCl (PRECEDEX) injection, , Perineural, PRN, Mateusz Davidson, CRNA, 50 mcg at 08/08/24 1042     Past Medical History:   Diagnosis Date    Anemia     17 years ago    Anxiety     Back pain     Binge eating disorder     Bipolar disorder     Cholelithiasis     Clotting disorder     Depression     Fatty liver     Comes and goes depending on weight    Fetal sacrococcygeal teratoma affecting antepartum care of mother 03/08/2022    Gallstones     GERD (gastroesophageal reflux disease)     on BID PPI    Hernia     4 years ago    History of transfusion     PATIENT DENIES REACTION    HL (hearing loss)     both ears, wears hearing aides    HSV-2 infection     last outbreak around 2020    Hyperemesis  gravidarum     pt states she always throws up every time she eats. She states she only drinks Gatorade.    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  Follows with psychiatry, on Prozac, previously on Zyprexa.    Recurrent pregnancy loss, antepartum condition or complication         Past Surgical History:   Procedure Laterality Date    ABDOMINOPLASTY N/A 2022    Procedure: ABDOMINOPLASTY WITH LIPOSUCTION;  Surgeon: Frederick Betts MD;  Location:  [facility];  Service: Plastics;  Laterality: N/A;    BARIATRIC SURGERY      5 years ago    BREAST AUGMENTATION Bilateral 2022    Procedure: BREAST AUGMENTATION BILATERAL WITH SILICONE IMPLANTS;  Surgeon: Frederick Betts MD;  Location:  [facility];  Service: Plastics;  Laterality: Bilateral;     SECTION N/A 2024    Procedure:  SECTION REPEAT;  Surgeon: Raul Cali MD;  Location:  [facility];  Service: Obstetrics/Gynecology;  Laterality: N/A;     SECTION PRIMARY  2022    CHOLECYSTECTOMY N/A 2021    Procedure: CHOLECYSTECTOMY LAPAROSCOPIC;  Surgeon: Amrita Roche MD;  Location:  [facility];  Service: General;  Laterality: N/A;    GASTRIC SLEEVE LAPAROSCOPIC  20.  [address] [place] [address].  op note rviewed    HERNIA REPAIR      4 years ago    UPPER GASTROINTESTINAL ENDOSCOPY      Couple of times    VENTRAL/INCISIONAL HERNIA REPAIR N/A 2022    Procedure: VENTRAL HERNIA REPAIR OPEN;  Surgeon: Segundo Morales MD;  Location:  [facility];  Service: General;  Laterality: N/A;       The additional following portions of the patient's history were reviewed and updated as appropriate: allergies, current medications, past family history, past medical history, past social history, past surgical history, and  "problem list.    Review of Systems   Respiratory: Negative.  Negative for shortness of breath.    Cardiovascular: Negative.  Negative for chest pain.   Gastrointestinal:  Positive for abdominal pain (Umbilical). Negative for abdominal distention and constipation.   Genitourinary:  Positive for vaginal pain (Vaginal irritation, itching, burning, and swelling.). Negative for dyspareunia, dysuria, menstrual problem, pelvic pain, pelvic pressure, urinary incontinence, vaginal bleeding and vaginal discharge.     All other systems reviewed and are negative.     I have reviewed and agree with the HPI, ROS, and historical information as entered above. Lizett Triplett, APRN      Objective   /60 (BP Location: Right arm, Patient Position: Sitting, Cuff Size: Adult)   Ht 167.6 cm (65.98\")   Wt 67.5 kg (148 lb 12.8 oz)   LMP 02/26/2025 (Approximate)   BMI 24.03 kg/m²     Physical Exam  Vitals and nursing note reviewed. Exam conducted with a chaperone present.   Constitutional:       General: She is not in acute distress.     Appearance: Normal appearance. She is not ill-appearing, toxic-appearing or diaphoretic.   HENT:      Head: Normocephalic and atraumatic.   Pulmonary:      Effort: Pulmonary effort is normal.   Abdominal:      Palpations: Abdomen is soft. There is no mass.      Tenderness: There is no abdominal tenderness. There is no guarding or rebound.      Hernia: No hernia is present.   Genitourinary:     General: Normal vulva.      Labia:         Right: No rash, tenderness, lesion or injury.         Left: No rash, tenderness, lesion or injury.       Vagina: Normal. No signs of injury. Vaginal discharge present. No erythema, tenderness, bleeding or lesions.      Cervix: Normal. Discharge present. No cervical motion tenderness, friability, lesion, erythema or cervical bleeding.      Uterus: Normal. Not deviated, not enlarged, not fixed and not tender.       Adnexa: Right adnexa normal and left adnexa normal.   "      Right: No mass or tenderness.          Left: No mass or tenderness.        Comments: Small amt of normal appearing clear/white mucoid/creamy d/c and normal tissue noted.   Neurological:      Mental Status: She is alert and oriented to person, place, and time.   Psychiatric:         Mood and Affect: Mood normal.         Behavior: Behavior normal.           Assessment & Plan     Assessment and Plan    Problem List Items Addressed This Visit       Herpetic vulvovaginitis    Relevant Medications    valACYclovir (Valtrex) 1000 MG tablet     Other Visit Diagnoses         Possible exposure to STD    -  Primary    Relevant Orders    Chlamydia trachomatis, Neisseria gonorrhoeae, Trichomonas vaginalis, PCR - Swab, Cervix    RPR, Rfx Qn RPR / Confirm TP    Hepatitis B Surface Antigen    Hepatitis C Antibody    HIV-1 / O / 2 Ag / Antibody 4th Generation      Subacute vaginitis        Relevant Orders    Chlamydia trachomatis, Neisseria gonorrhoeae, Trichomonas vaginalis, PCR - Swab, Cervix    RPR, Rfx Qn RPR / Confirm TP    Hepatitis B Surface Antigen    Hepatitis C Antibody    HIV-1 / O / 2 Ag / Antibody 4th Generation    Genital Mycoplasmas CHRIS, Swab - Swab, Cervix            Vaginitis. Potential etiologies reviewed.   Encouraged condom use to prevent STIs  Nuswab pending- Gonorrhea, Chlamydia, Trich, Urea/Mycoplasma.  STD blood panel pending. Will treat further if indicated.   Recommend Valtrex to see if HSV infection is cause of irritation. Valtrex prescribed.   Discussed safe sexual practice in detail.   F/u with PCP for evaluation of umbilical pain.   Vaginitis and preventing STI education included in patient instructions.   Return in about 6 months (around 9/25/2025) for Annual physical or sooner if needed.        Lizett Triplett, APRN  03/17/2025

## 2025-03-18 LAB
HBV SURFACE AG SERPL QL IA: NEGATIVE
HCV IGG SERPL QL IA: NON REACTIVE
HIV 1+2 AB+HIV1 P24 AG SERPL QL IA: NON REACTIVE
RPR SER QL: NON REACTIVE

## 2025-03-20 LAB
C TRACH RRNA SPEC QL NAA+PROBE: NEGATIVE
M GENITALIUM DNA SPEC QL NAA+PROBE: NEGATIVE
M HOMINIS DNA SPEC QL NAA+PROBE: NEGATIVE
N GONORRHOEA RRNA SPEC QL NAA+PROBE: NEGATIVE
T VAGINALIS RRNA SPEC QL NAA+PROBE: NEGATIVE
UREAPLASMA DNA SPEC QL NAA+PROBE: POSITIVE

## 2025-03-21 ENCOUNTER — TELEPHONE (OUTPATIENT)
Dept: OBSTETRICS AND GYNECOLOGY | Facility: CLINIC | Age: 35
End: 2025-03-21
Payer: MEDICAID

## 2025-03-27 ENCOUNTER — PATIENT MESSAGE (OUTPATIENT)
Dept: OBSTETRICS AND GYNECOLOGY | Facility: CLINIC | Age: 35
End: 2025-03-27
Payer: MEDICAID

## 2025-03-31 ENCOUNTER — TELEPHONE (OUTPATIENT)
Dept: OBSTETRICS AND GYNECOLOGY | Facility: CLINIC | Age: 35
End: 2025-03-31
Payer: MEDICAID

## 2025-03-31 DIAGNOSIS — B37.31 RECURRENT CANDIDIASIS OF VAGINA: ICD-10-CM

## 2025-03-31 DIAGNOSIS — N34.1 NGU DUE TO UREAPLASMA UREALYTICUM: Primary | ICD-10-CM

## 2025-03-31 DIAGNOSIS — A49.3 NGU DUE TO UREAPLASMA UREALYTICUM: Primary | ICD-10-CM

## 2025-03-31 RX ORDER — AZITHROMYCIN 500 MG/1
500 TABLET, FILM COATED ORAL DAILY
Qty: 5 TABLET | Refills: 0 | Status: SHIPPED | OUTPATIENT
Start: 2025-03-31 | End: 2025-04-05

## 2025-03-31 RX ORDER — FLUCONAZOLE 150 MG/1
TABLET ORAL
Qty: 3 TABLET | Refills: 0 | Status: SHIPPED | OUTPATIENT
Start: 2025-03-31

## 2025-03-31 NOTE — TELEPHONE ENCOUNTER
Still has discharge and itching and burning, same symptoms she had when she was first diagnosed with ureaplasma. She would like to try the azithromycin since it was offered to her. Also requesting diflucan.   She is aware that if this does not resolved her symptoms she will have to be seen for another visit.   Rx sent.

## 2025-03-31 NOTE — TELEPHONE ENCOUNTER
Pt called in, states on 9th day of  antibiotic, , symptoms ( burning, itching) is not going away, wants to know is there another antibiotic she can take.

## 2025-04-08 NOTE — PROGRESS NOTES
[facility]  [address]  [place] [address]     Office Note      Date: 04/10/2025  Patient Name: Riana Anthony  MRN: [id number]  : 1990    Subjective     Chief Complaint  Obesity Management follow-up    Riana Anthony presents to [facility] for obesity management.       Patient is satisfied with weight loss progress. Appetite is well controlled. Reports no side effects of prescribed medications today. The patient is not taking multivitamin and is not taking fish oil.  The patient is not using a food journal.  The patient rates current efforts as 9 out of 10.    The patient is exercising with a FITT score of:    Frequency Intensity Time Strength Training   []   0, none []   0 []   0 [x]   0   []   1 (1-2x/week) [x]   1 (light) []   1 (<10 min) []   1 (1x/week)   [x]   2 (3-5x/week) []   2 (moderate) []   2 (10-20 min) []   2 (2x/week)   []   3 (daily) []   3 (moderately hard)  []   4 (very hard) []   3 (20-30 min)  [x]   4 (>30 min) []   3 (3-4x/week)     Review of Systems   Constitutional:  Negative for appetite change and fatigue.   Eyes:  Negative for visual disturbance.   Cardiovascular:  Negative for chest pain and palpitations.   Gastrointestinal:  Negative for constipation and indigestion.   Neurological:  Negative for light-headedness.       Objective   Start weight: 182.3 pounds.    Total Loss lb/%Loss of beginning body weight (BBW): -39.9 lb/-21.89%  Change in weight since last visit: -12lb    Recent Weight History:   Wt Readings from Last 6 Encounters:   04/10/25 64.6 kg (142 lb 6.4 oz)   25 67.5 kg (148 lb 12.8 oz)   25 70.9 kg (156 lb 6.4 oz)   25 70 kg (154 lb 6.4 oz)   25 70.9 kg (156 lb 6.4 oz)   24 80.1 kg (176 lb 9.6 oz)     Body mass index is 22.98 kg/m².   Body composition analysis completed and showed:   Body Fat %: 30.8%    Measurements (in inches)  Waist Circumference:  "31.5    Vital Signs:   /70   Pulse 86   Resp 18   Ht 167.6 cm (66\")   Wt 64.6 kg (142 lb 6.4 oz)   SpO2 98%   BMI 22.98 kg/m²       Physical Exam  Vitals reviewed.   Constitutional:       Appearance: She is well-developed.   Pulmonary:      Effort: Pulmonary effort is normal.   Neurological:      Mental Status: She is alert.   Psychiatric:         Attention and Perception: Attention normal.         Mood and Affect: Mood normal.         Speech: Speech normal.         Behavior: Behavior normal.        Result Review :     Common labs          10/8/2024    11:01 1/2/2025    10:54 1/28/2025    08:45   Common Labs   Glucose 81  85  76    BUN 6  11  10    Creatinine 0.78  1.01  0.83    Sodium 138  136  140    Potassium 4.4  4.0  4.3    Chloride 99  100  104    Calcium 9.9  10.1  9.9    Albumin 4.5  4.5     Total Bilirubin 0.3  0.4     Alkaline Phosphatase 114  99     AST (SGOT) 42  24     ALT (SGPT) 34  31                Assessment / Plan        Diagnoses and all orders for this visit:    1. Encounter for weight management (Primary)  Assessment & Plan:  This is a follow-up visit patient is down around 12 pounds since last being seen approximately 2 months ago.  She has reached her goal weight and is in maintenance phase.  We are in the process of finding the best dose to stabilize her weight.  Currently is taking compounded semaglutide 1.7 mg and will reducing that to 1 mg today.  We will do this for 4 to 6 weeks and touch base.  If she continues to still lose weight we will likely continue to reduce dose again.  A1c labs were reviewed and are due to be repeated today.    Orders:  -     Semaglutide-Weight Management (Wegovy) 1 MG/0.5ML solution auto-injector; Inject 0.5 mL under the skin into the appropriate area as directed 1 (One) Time Per Week for 30 days.  Dispense: 2 mL; Refill: 1  -     Hemoglobin A1c  -     Comprehensive Metabolic Panel  -     Vitamin D,25-Hydroxy    2. Encounter for long-term current " use of medication  -     Hemoglobin A1c  -     Comprehensive Metabolic Panel  -     Vitamin D,25-Hydroxy    3. Vitamin D deficiency  -     Vitamin D,25-Hydroxy        We discussed the risks, benefits, and limitations of treatments. Continue medications and OTC supplements as discussed. Patient verbalizes understanding of and agreement with management plan.     Follow Up   Return in about 4 weeks (around 5/8/2025).  Patient was given instructions and counseling regarding her condition or for health maintenance advice. Please see specific information pulled into the AVS if appropriate.     I spent 40 minutes on this date of service. This time includes time spent by me in the following activities:preparing for the visit, counseling and educating the patient/family/caregiver, ordering medications, tests, or procedures and documenting information in the medical record.    Ana Morgan, APRN  04/10/2025

## 2025-04-10 ENCOUNTER — OFFICE VISIT (OUTPATIENT)
Dept: BARIATRICS/WEIGHT MGMT | Facility: CLINIC | Age: 35
End: 2025-04-10
Payer: MEDICAID

## 2025-04-10 VITALS
DIASTOLIC BLOOD PRESSURE: 70 MMHG | SYSTOLIC BLOOD PRESSURE: 106 MMHG | HEIGHT: 66 IN | WEIGHT: 142.4 LBS | HEART RATE: 86 BPM | RESPIRATION RATE: 18 BRPM | OXYGEN SATURATION: 98 % | BODY MASS INDEX: 22.88 KG/M2

## 2025-04-10 DIAGNOSIS — Z76.89 ENCOUNTER FOR WEIGHT MANAGEMENT: Primary | ICD-10-CM

## 2025-04-10 DIAGNOSIS — E55.9 VITAMIN D DEFICIENCY: ICD-10-CM

## 2025-04-10 DIAGNOSIS — Z79.899 ENCOUNTER FOR LONG-TERM CURRENT USE OF MEDICATION: ICD-10-CM

## 2025-04-10 RX ORDER — SEMAGLUTIDE 1 MG/.5ML
1 INJECTION, SOLUTION SUBCUTANEOUS WEEKLY
Qty: 2 ML | Refills: 1 | Status: SHIPPED | OUTPATIENT
Start: 2025-04-10 | End: 2025-05-10

## 2025-04-10 NOTE — ASSESSMENT & PLAN NOTE
From: Patti Telol  To: PATRICK Chris  Sent: 9/13/2020 9:29 PM PDT  Subject: Non-Urgent Medical Question    Elia Sims,   I am writing you because all weekend I haven't been able to eat. I believe my tongue has ulcers on it I have red little bumps on my tongue and it burns when I eat or drinking. I've also got dry mouth. And I'm always thirsty. But the burning sensation really bothers me.     Thank you    Patti Tello   This is a follow-up visit patient is down around 12 pounds since last being seen approximately 2 months ago.  She has reached her goal weight and is in maintenance phase.  We are in the process of finding the best dose to stabilize her weight.  Currently is taking compounded semaglutide 1.7 mg and will reducing that to 1 mg today.  We will do this for 4 to 6 weeks and touch base.  If she continues to still lose weight we will likely continue to reduce dose again.  A1c labs were reviewed and are due to be repeated today.

## 2025-04-13 ENCOUNTER — RESULTS FOLLOW-UP (OUTPATIENT)
Dept: BARIATRICS/WEIGHT MGMT | Facility: CLINIC | Age: 35
End: 2025-04-13
Payer: MEDICAID

## 2025-04-13 DIAGNOSIS — R79.89 LOW VITAMIN D LEVEL: Primary | ICD-10-CM

## 2025-04-13 LAB
25(OH)D3+25(OH)D2 SERPL-MCNC: 26.9 NG/ML (ref 30–100)
ALBUMIN SERPL-MCNC: 4.3 G/DL (ref 3.9–4.9)
ALP SERPL-CCNC: 97 IU/L (ref 44–121)
ALT SERPL-CCNC: 53 IU/L (ref 0–32)
AST SERPL-CCNC: 30 IU/L (ref 0–40)
BILIRUB SERPL-MCNC: 0.3 MG/DL (ref 0–1.2)
BUN SERPL-MCNC: 10 MG/DL (ref 6–20)
BUN/CREAT SERPL: 12 (ref 9–23)
CALCIUM SERPL-MCNC: 9.4 MG/DL (ref 8.7–10.2)
CHLORIDE SERPL-SCNC: 103 MMOL/L (ref 96–106)
CO2 SERPL-SCNC: 23 MMOL/L (ref 20–29)
CREAT SERPL-MCNC: 0.84 MG/DL (ref 0.57–1)
EGFRCR SERPLBLD CKD-EPI 2021: 94 ML/MIN/1.73
GLOBULIN SER CALC-MCNC: 2.5 G/DL (ref 1.5–4.5)
GLUCOSE SERPL-MCNC: 81 MG/DL (ref 70–99)
HBA1C MFR BLD: 5.2 % (ref 4.8–5.6)
POTASSIUM SERPL-SCNC: 4.4 MMOL/L (ref 3.5–5.2)
PROT SERPL-MCNC: 6.8 G/DL (ref 6–8.5)
SODIUM SERPL-SCNC: 138 MMOL/L (ref 134–144)

## 2025-04-13 RX ORDER — ERGOCALCIFEROL 1.25 MG/1
50000 CAPSULE, LIQUID FILLED ORAL WEEKLY
Qty: 8 CAPSULE | Refills: 0 | Status: SHIPPED | OUTPATIENT
Start: 2025-04-13

## 2025-04-23 ENCOUNTER — OFFICE VISIT (OUTPATIENT)
Dept: OBSTETRICS AND GYNECOLOGY | Facility: CLINIC | Age: 35
End: 2025-04-23
Payer: MEDICAID

## 2025-04-23 VITALS
SYSTOLIC BLOOD PRESSURE: 98 MMHG | HEIGHT: 66 IN | BODY MASS INDEX: 23.53 KG/M2 | WEIGHT: 146.4 LBS | DIASTOLIC BLOOD PRESSURE: 66 MMHG

## 2025-04-23 DIAGNOSIS — N89.8 VAGINAL DISCHARGE: ICD-10-CM

## 2025-04-23 DIAGNOSIS — Z22.39 CARRIER OF UREAPLASMA UREALYTICUM: Primary | ICD-10-CM

## 2025-04-23 NOTE — PROGRESS NOTES
Chief Complaint   Patient presents with    Follow-up     KOFI       Subjective   HPI  Riana Anthony is a 34 y.o. female, . Patient's last menstrual period was 2025 (approximate).. who presents for follow up on ureaplasma.      At her last visit she was treated with  Doxycycline and partner also treated . She called office after treatment and reported still having discharge, itching, and burning. Prescription for Azithromycin given and she finished treatment. Since then she reports her symptoms/issue has improved. The patient reports additional symptoms as none.        Additional OB/GYN History     Last Pap :   Last Completed Pap Smear            Upcoming       PAP SMEAR (Yearly) Next due on 2024  HPV GENOTYPING, [facility]) - , Cervix, Endocervix    2024  LIQUID-BASED PAP SMEAR, [facility] ([facility])    2014  SCANNED - PAP SMEAR                            Last mammogram:   Last Completed Mammogram    This patient has no relevant Health Maintenance data.         Tobacco Usage?: Yes Riana Anthony  reports that she has never smoked. She has never been exposed to tobacco smoke. She has never used smokeless tobacco. I have educated her on the risk of diseases from using tobacco products such as cancer, COPD, and heart disease.     I advised her to quit and she is not willing to quit.    I spent 3  minutes counseling the patient.        OB History          6    Para   5    Term   3       2    AB   1    Living   3         SAB   1    IAB   0    Ectopic   0    Molar   0    Multiple   0    Live Births   5          Obstetric Comments   FOB #1 : Pregnancy # 1-6                 Current Outpatient Medications:     Abilify 2 MG tablet, Take 1 tablet by mouth Daily., Disp: , Rfl:     Boric Acid suppository Suppository, Insert 1 suppository into the vagina 3 (Three) Times a Week., Disp: 36 suppository, Rfl: 3    buPROPion XL (WELLBUTRIN XL)  150 MG 24 hr tablet, Take 1 tablet by mouth Every Morning., Disp: , Rfl:     lamoTRIgine (LaMICtal) 150 MG tablet, Take 1 tablet by mouth Daily., Disp: , Rfl:     Semaglutide-Weight Management (Wegovy) 1 MG/0.5ML solution auto-injector, Inject 0.5 mL under the skin into the appropriate area as directed 1 (One) Time Per Week for 30 days., Disp: 2 mL, Rfl: 1    vitamin D (ERGOCALCIFEROL) 1.25 MG (31183 UT) capsule capsule, Take 1 capsule by mouth 1 (One) Time Per Week., Disp: 8 capsule, Rfl: 0  No current facility-administered medications for this visit.    Facility-Administered Medications Ordered in Other Visits:     dexmedetomidine HCl (PRECEDEX) injection, , Perineural, PRN, Mateusz Davidson, CRNA, 50 mcg at 24 1042     Past Medical History:   Diagnosis Date    Anemia     17 years ago    Anxiety     Back pain     Binge eating disorder     Bipolar disorder     Cholelithiasis     Clotting disorder     Depression     Fatty liver     Comes and goes depending on weight    Fetal sacrococcygeal teratoma affecting antepartum care of mother 2022    Gallstones     GERD (gastroesophageal reflux disease)     on BID PPI    Hernia     4 years ago    History of transfusion     PATIENT DENIES REACTION    HL (hearing loss)     both ears, wears hearing aides    HSV-2 infection     last outbreak around     Hyperemesis gravidarum     pt states she always throws up every time she eats. She states she only drinks Gatorade.    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  Follows with psychiatry, on Prozac, previously on Zyprexa.    Recurrent pregnancy loss, antepartum condition or complication         Past Surgical History:   Procedure Laterality Date    ABDOMINOPLASTY N/A 2022    Procedure: ABDOMINOPLASTY WITH LIPOSUCTION;   "Surgeon: Frederick Betts MD;  Location: [facility] OR;  Service: Plastics;  Laterality: N/A;    BARIATRIC SURGERY      5 years ago    BREAST AUGMENTATION Bilateral 2022    Procedure: BREAST AUGMENTATION BILATERAL WITH SILICONE IMPLANTS;  Surgeon: Frederick Betts MD;  Location:  [facility];  Service: Plastics;  Laterality: Bilateral;     SECTION N/A 2024    Procedure:  SECTION REPEAT;  Surgeon: Raul Cali MD;  Location: [facility] LABOR DELIVERY;  Service: Obstetrics/Gynecology;  Laterality: N/A;     SECTION PRIMARY  2022    CHOLECYSTECTOMY N/A 2021    Procedure: CHOLECYSTECTOMY LAPAROSCOPIC;  Surgeon: Amrita Roche MD;  Location: [facility] OR;  Service: General;  Laterality: N/A;    GASTRIC SLEEVE LAPAROSCOPIC  20.  [address].  op note rviewed    HERNIA REPAIR      4 years ago    UPPER GASTROINTESTINAL ENDOSCOPY      Couple of times    VENTRAL/INCISIONAL HERNIA REPAIR N/A 2022    Procedure: VENTRAL HERNIA REPAIR OPEN;  Surgeon: Segundo Morales MD;  Location:  [facility] OR;  Service: General;  Laterality: N/A;       The additional following portions of the patient's history were reviewed and updated as appropriate: allergies, current medications, past family history, past medical history, past social history, past surgical history, and problem list.    Review of Systems   Respiratory: Negative.  Negative for shortness of breath.    Cardiovascular: Negative.  Negative for chest pain.   Gastrointestinal: Negative.  Negative for abdominal distention, abdominal pain and constipation.   Genitourinary:  Negative for dyspareunia, dysuria, menstrual problem, pelvic pain, pelvic pressure, urinary incontinence, vaginal bleeding, vaginal discharge and vaginal pain.       I have reviewed and agree with the HPI, ROS, and historical information as entered above. Lizett Triplett, APRN      Objective   BP 98/66   Ht 167.6 cm (66\")   Wt 66.4 kg " (146 lb 6.4 oz)   LMP 04/16/2025 (Approximate)   BMI 23.63 kg/m²     Physical Exam  Vitals and nursing note reviewed. Exam conducted with a chaperone present.   Constitutional:       General: She is not in acute distress.     Appearance: Normal appearance. She is not ill-appearing or toxic-appearing.   HENT:      Head: Normocephalic and atraumatic.   Pulmonary:      Effort: Pulmonary effort is normal.   Abdominal:      Palpations: Abdomen is soft. There is no mass.      Tenderness: There is no abdominal tenderness. There is no guarding or rebound.      Hernia: No hernia is present.   Genitourinary:     General: Normal vulva.      Labia:         Right: No rash, tenderness, lesion or injury.         Left: No rash, tenderness, lesion or injury.       Vagina: No signs of injury. Vaginal discharge present. No erythema, tenderness, bleeding, lesions or prolapsed vaginal walls.      Cervix: Discharge present. No cervical motion tenderness, friability, lesion, erythema or cervical bleeding.      Uterus: Normal. Not enlarged and not tender.       Adnexa: Right adnexa normal and left adnexa normal.        Right: No mass, tenderness or fullness.          Left: No mass, tenderness or fullness.        Comments: Clear watery vaginal d/c noted.   Neurological:      Mental Status: She is alert and oriented to person, place, and time.   Psychiatric:         Mood and Affect: Mood normal.         Behavior: Behavior normal.         Assessment & Plan     Assessment     Problem List Items Addressed This Visit    None  Visit Diagnoses         Carrier of ureaplasma urealyticum    -  Primary    Relevant Orders    Genital Mycoplasmas CHRIS, Swab - Swab, Cervix      Vaginal discharge        Relevant Orders    Genital Mycoplasmas CHRIS, Swab - Swab, Cervix    NuSwab Vaginitis (VG) - Swab, Vagina          Plan:  Ureaplasma- Test of cure. Patient and partner treated. Asymptomatic.   Watery Clear d/c noted during exam. Nuswab pending- BV, yeast,  Urea/Mycoplasma.   Preventing STI and smoking cessation education included in patient instructions.   Return for Annual physical or sooner if needed.        Lizett Triplett, APRN  04/23/2025

## 2025-04-25 LAB
M GENITALIUM DNA SPEC QL NAA+PROBE: NEGATIVE
M HOMINIS DNA SPEC QL NAA+PROBE: NEGATIVE
UREAPLASMA DNA SPEC QL NAA+PROBE: NEGATIVE

## 2025-05-07 DIAGNOSIS — Z86.39 HISTORY OF OBESITY: Primary | ICD-10-CM

## 2025-05-07 DIAGNOSIS — Z76.89 ENCOUNTER FOR WEIGHT MANAGEMENT: ICD-10-CM

## 2025-05-07 RX ORDER — SEMAGLUTIDE 1 MG/.5ML
1 INJECTION, SOLUTION SUBCUTANEOUS WEEKLY
Qty: 2 ML | Refills: 1 | Status: CANCELLED | OUTPATIENT
Start: 2025-05-07 | End: 2025-06-06

## 2025-05-08 RX ORDER — SEMAGLUTIDE 1 MG/.5ML
1 INJECTION, SOLUTION SUBCUTANEOUS WEEKLY
Qty: 2 ML | Refills: 1 | Status: SHIPPED | OUTPATIENT
Start: 2025-05-08 | End: 2025-06-07

## 2025-05-13 ENCOUNTER — OFFICE VISIT (OUTPATIENT)
Dept: BARIATRICS/WEIGHT MGMT | Facility: CLINIC | Age: 35
End: 2025-05-13
Payer: MEDICAID

## 2025-05-13 VITALS
WEIGHT: 145 LBS | HEIGHT: 66 IN | OXYGEN SATURATION: 98 % | SYSTOLIC BLOOD PRESSURE: 108 MMHG | RESPIRATION RATE: 18 BRPM | HEART RATE: 70 BPM | BODY MASS INDEX: 23.3 KG/M2 | DIASTOLIC BLOOD PRESSURE: 60 MMHG

## 2025-05-13 DIAGNOSIS — R74.8 ELEVATED LIVER ENZYMES: ICD-10-CM

## 2025-05-13 DIAGNOSIS — Z76.89 ENCOUNTER FOR WEIGHT MANAGEMENT: Primary | ICD-10-CM

## 2025-05-13 DIAGNOSIS — Z86.39 HISTORY OF OBESITY: ICD-10-CM

## 2025-05-13 RX ORDER — TIRZEPATIDE 5 MG/.5ML
5 INJECTION, SOLUTION SUBCUTANEOUS WEEKLY
Qty: 2 ML | Refills: 1 | Status: SHIPPED | OUTPATIENT
Start: 2025-05-13 | End: 2025-06-12

## 2025-05-13 NOTE — PROGRESS NOTES
[facility]  [address]  [place] [address]     Office Note      Date: 2025  Patient Name: Riana Anthony  MRN: [id number]  : 1990  --previously on compounded, switched to self pay    Subjective     Chief Complaint  Obesity Management follow-up    Riana Anthony presents to [facility] for obesity management.  {Problem List  Visit Diagnosis   Encounters  Notes  Medications  Labs  Result Review Imaging  Media :23}   Patient is satisfied with weight loss progress. Appetite is well controlled. Reports no side effects of prescribed medications today. The patient is not taking multivitamin and is not taking fish oil.  The patient is not using a food journal.  The patient rates current efforts as 8 out of 10.    History of Present Illness        The patient is exercising with a FITT score of:    Frequency Intensity Time Strength Training   []   0, none []   0 []   0 []   0   []   1 (1-2x/week) [x]   1 (light) []   1 (<10 min) []   1 (1x/week)   []   2 (3-5x/week) [x]   2 (moderate) []   2 (10-20 min) [x]   2 (2x/week)   [x]   3 (daily) []   3 (moderately hard)  []   4 (very hard) [x]   3 (20-30 min)  []   4 (>30 min) []   3 (3-4x/week)     Review of Systems   Constitutional:  Negative for appetite change and fatigue.   Eyes:  Negative for visual disturbance.   Cardiovascular:  Negative for chest pain and palpitations.   Gastrointestinal:  Negative for constipation and indigestion.   Neurological:  Negative for light-headedness.       Objective   Start weight: 182.6 pounds.    Total Loss lb/%Loss of beginning body weight (BBW): -37.6 lb/-20.59%  Change in weight since last visit: +2.4    Recent Weight History:   Wt Readings from Last 6 Encounters:   25 65.8 kg (145 lb)   25 66.4 kg (146 lb 6.4 oz)   04/10/25 64.6 kg (142 lb 6.4 oz)   25 67.5 kg (148 lb 12.8 oz)   03/12/25 70.9 kg (156 lb 6.4 oz)  "  02/13/25 70 kg (154 lb 6.4 oz)         Body mass index is 23.4 kg/m².   Body composition analysis completed and showed:        Measurements (in inches)       Vital Signs:   /60   Pulse 70   Resp 18   Ht 167.6 cm (66\")   Wt 65.8 kg (145 lb)   SpO2 98%   BMI 23.40 kg/m²       Physical Exam   Result Review :{Labs  Result Review  Imaging  Med Tab  Media :23}   {The following data was reviewed by (Optional):22093}  {Ambulatory Labs (Optional):33150}           Assessment / Plan     {CC Problem List  Visit Diagnosis  ROS  Review (Popup)  Health Maintenance  Quality  BestPractice  Medications  SmartSets  SnapShot Encounters  Media :23}   Diagnoses and all orders for this visit:    1. Encounter for weight management (Primary)        Assessment & Plan        We discussed the risks, benefits, and limitations of treatments. Continue medications and OTC supplements as discussed. Patient verbalizes understanding of and agreement with management plan.     Follow Up {Instructions Charge Capture  Follow-up Communications :23}  No follow-ups on file.  Patient was given instructions and counseling regarding her condition or for health maintenance advice. Please see specific information pulled into the AVS if appropriate.     I spent *** minutes on this date of service. This time includes time spent by me in the following activities:preparing for the visit, counseling and educating the patient/family/caregiver, ordering medications, tests, or procedures and documenting information in the medical record.    {TOM [employer] Provider Statement:12469}    Ana Morgan, APRN  05/13/2025   "

## 2025-05-13 NOTE — ASSESSMENT & PLAN NOTE
This is a follow-up visit patient is up a little over 2 pounds since last being seen.  She still remains within a healthy fat percent for someone her age and sex.  Previously taking compounded semaglutide and tolerating well.  She is at maintenance dose and maintaining her weight with use of medication.  Discussed transitioning to Trizepatide self-pay.  Prescription sent and patient advised to complete any registration they sent to her.  Contact office if it is longer than 3 weeks after taking last medication.

## 2025-05-13 NOTE — PROGRESS NOTES
[facility]  [address]  [place] [address]     Office Note      Date: 2025  Patient Name: Riana Anthony  MRN: [id number]  : 1990    Subjective     Chief Complaint  Obesity Management follow-up    Riana Anthony presents to [facility] for obesity management.       Patient is satisfied with weight loss progress. Appetite is well controlled. Reports no side effects of prescribed medications today. The patient is not taking multivitamin and is not taking fish oil.  The patient is not using a food journal.  The patient rates current efforts as 8 out of 10. She is currently taking 1mg semaglutide, compounded version. She was prescribed the 1mg pens but the mail pharmacy has not filled to date. She is currently 2 weeks out on last dose. Asking to discuss switching to trizepatide.     The patient is exercising with a FITT score of:    Frequency Intensity Time Strength Training   []   0, none []   0 []   0 []   0   []   1 (1-2x/week) [x]   1 (light) []   1 (<10 min) []   1 (1x/week)   []   2 (3-5x/week) [x]   2 (moderate) []   2 (10-20 min) [x]   2 (2x/week)   [x]   3 (daily) []   3 (moderately hard)  []   4 (very hard) [x]   3 (20-30 min)  []   4 (>30 min) []   3 (3-4x/week)     Review of Systems   Constitutional:  Negative for appetite change and fatigue.   Eyes:  Negative for visual disturbance.   Cardiovascular:  Negative for chest pain and palpitations.   Gastrointestinal:  Negative for constipation and indigestion.   Neurological:  Negative for light-headedness.     Objective   Start weight: 182.6 pounds.    Total Loss lb/%Loss of beginning body weight (BBW): -37.6 lb/-20.59%  Change in weight since last visit: +2.4    Recent Weight History:   Wt Readings from Last 6 Encounters:   25 65.8 kg (145 lb)   25 66.4 kg (146 lb 6.4 oz)   04/10/25 64.6 kg (142 lb 6.4 oz)   25 67.5 kg (148 lb 12.8 oz)  "  03/12/25 70.9 kg (156 lb 6.4 oz)   02/13/25 70 kg (154 lb 6.4 oz)       Body mass index is 23.4 kg/m².   Body composition analysis completed and showed:      Measurements (in inches)     Vital Signs:   /60   Pulse 70   Resp 18   Ht 167.6 cm (66\")   Wt 65.8 kg (145 lb)   SpO2 98%   BMI 23.40 kg/m²       Physical Exam  Vitals reviewed.   Constitutional:       Appearance: She is well-developed.   Pulmonary:      Effort: Pulmonary effort is normal.   Neurological:      Mental Status: She is alert.   Psychiatric:         Attention and Perception: Attention normal.         Mood and Affect: Mood normal.         Speech: Speech normal.         Behavior: Behavior normal.        Result Review :     Common labs          1/2/2025    10:54 1/28/2025    08:45 4/10/2025    11:34   Common Labs   Glucose 85  76  81    BUN 11  10  10    Creatinine 1.01  0.83  0.84    Sodium 136  140  138    Potassium 4.0  4.3  4.4    Chloride 100  104  103    Calcium 10.1  9.9  9.4    Albumin 4.5   4.3    Total Bilirubin 0.4   0.3    Alkaline Phosphatase 99   97    AST (SGOT) 24   30    ALT (SGPT) 31   53    Hemoglobin A1C   5.2      Assessment / Plan        Diagnoses and all orders for this visit:    1. Encounter for weight management (Primary)  Assessment & Plan:  This is a follow-up visit patient is up a little over 2 pounds since last being seen.  She still remains within a healthy fat percent for someone her age and sex.  Previously taking compounded semaglutide and tolerating well.  She is at maintenance dose and maintaining her weight with use of medication.  Discussed transitioning to Trizepatide self-pay.  Prescription sent and patient advised to complete any registration they sent to her.  Contact office if it is longer than 3 weeks after taking last medication.    Orders:  -     Tirzepatide-Weight Management (Zepbound) 5 MG/0.5ML solution; Inject 0.5 mL under the skin into the appropriate area as directed 1 (One) Time Per Week " for 30 days. Long term patient, weight controlled with medication; OK to start at 5mg  Dispense: 2 mL; Refill: 1    2. History of obesity  -     Tirzepatide-Weight Management (Zepbound) 5 MG/0.5ML solution; Inject 0.5 mL under the skin into the appropriate area as directed 1 (One) Time Per Week for 30 days. Long term patient, weight controlled with medication; OK to start at 5mg  Dispense: 2 mL; Refill: 1    3. Elevated liver enzymes  Comments:  Follow up with PCP or other prescribing providers to discuss further. Advised that tylenol or alcohol could both increase liver enzymes or possible prescription      We discussed the risks, benefits, and limitations of treatments. Continue medications and OTC supplements as discussed. Patient verbalizes understanding of and agreement with management plan.     Follow Up   Return in about 4 weeks (around 6/10/2025).  Patient was given instructions and counseling regarding her condition or for health maintenance advice. Please see specific information pulled into the AVS if appropriate.     I spent 40 minutes on this date of service. This time includes time spent by me in the following activities:preparing for the visit, counseling and educating the patient/family/caregiver, ordering medications, tests, or procedures and documenting information in the medical record.    Ana Morgan, APRN  05/13/2025

## 2025-06-02 ENCOUNTER — PATIENT MESSAGE (OUTPATIENT)
Dept: BARIATRICS/WEIGHT MGMT | Facility: CLINIC | Age: 35
End: 2025-06-02
Payer: MEDICAID

## 2025-06-02 DIAGNOSIS — R74.8 ELEVATED LIVER ENZYMES: ICD-10-CM

## 2025-06-02 DIAGNOSIS — Z76.89 ENCOUNTER FOR WEIGHT MANAGEMENT: Primary | ICD-10-CM

## 2025-06-04 LAB
ALBUMIN SERPL-MCNC: 4.3 G/DL (ref 3.5–5.2)
ALBUMIN/GLOB SERPL: 1.8 G/DL
ALP SERPL-CCNC: 111 U/L (ref 39–117)
ALT SERPL-CCNC: 59 U/L (ref 1–33)
AST SERPL-CCNC: 42 U/L (ref 1–32)
BILIRUB SERPL-MCNC: 0.3 MG/DL (ref 0–1.2)
BUN SERPL-MCNC: 14 MG/DL (ref 6–20)
BUN/CREAT SERPL: 17.1 (ref 7–25)
CALCIUM SERPL-MCNC: 9.2 MG/DL (ref 8.6–10.5)
CHLORIDE SERPL-SCNC: 104 MMOL/L (ref 98–107)
CO2 SERPL-SCNC: 24.4 MMOL/L (ref 22–29)
CREAT SERPL-MCNC: 0.82 MG/DL (ref 0.57–1)
EGFRCR SERPLBLD CKD-EPI 2021: 97 ML/MIN/1.73
GLOBULIN SER CALC-MCNC: 2.4 GM/DL
GLUCOSE SERPL-MCNC: 72 MG/DL (ref 65–99)
POTASSIUM SERPL-SCNC: 4.2 MMOL/L (ref 3.5–5.2)
PROT SERPL-MCNC: 6.7 G/DL (ref 6–8.5)
SODIUM SERPL-SCNC: 139 MMOL/L (ref 136–145)

## 2025-06-05 DIAGNOSIS — R79.89 ELEVATED LFTS: Primary | ICD-10-CM

## 2025-06-09 ENCOUNTER — LAB (OUTPATIENT)
Dept: LAB | Facility: HOSPITAL | Age: 35
End: 2025-06-09
Payer: MEDICAID

## 2025-06-09 DIAGNOSIS — R79.89 ELEVATED LFTS: ICD-10-CM

## 2025-06-09 LAB
ALPHA1 GLOB MFR UR ELPH: 121 MG/DL (ref 90–200)
CERULOPLASMIN SERPL-MCNC: 25 MG/DL (ref 19–39)
HAV IGM SERPL QL IA: NORMAL
HBV CORE IGM SERPL QL IA: NORMAL
HBV SURFACE AG SERPL QL IA: NORMAL
HCV AB SER QL: NORMAL
IRON 24H UR-MRATE: 62 MCG/DL (ref 37–145)
IRON SATN MFR SERPL: 14 % (ref 20–50)
TIBC SERPL-MCNC: 432 MCG/DL (ref 298–536)
TRANSFERRIN SERPL-MCNC: 290 MG/DL (ref 200–360)

## 2025-06-09 PROCEDURE — 83540 ASSAY OF IRON: CPT

## 2025-06-09 PROCEDURE — 84478 ASSAY OF TRIGLYCERIDES: CPT

## 2025-06-09 PROCEDURE — 80074 ACUTE HEPATITIS PANEL: CPT

## 2025-06-09 PROCEDURE — 82172 ASSAY OF APOLIPOPROTEIN: CPT

## 2025-06-09 PROCEDURE — 36415 COLL VENOUS BLD VENIPUNCTURE: CPT

## 2025-06-09 PROCEDURE — 82103 ALPHA-1-ANTITRYPSIN TOTAL: CPT

## 2025-06-09 PROCEDURE — 83883 ASSAY NEPHELOMETRY NOT SPEC: CPT

## 2025-06-09 PROCEDURE — 82390 ASSAY OF CERULOPLASMIN: CPT

## 2025-06-09 PROCEDURE — 86015 ACTIN ANTIBODY EACH: CPT

## 2025-06-09 PROCEDURE — 84460 ALANINE AMINO (ALT) (SGPT): CPT

## 2025-06-09 PROCEDURE — 84450 TRANSFERASE (AST) (SGOT): CPT

## 2025-06-09 PROCEDURE — 82247 BILIRUBIN TOTAL: CPT

## 2025-06-09 PROCEDURE — 83010 ASSAY OF HAPTOGLOBIN QUANT: CPT

## 2025-06-09 PROCEDURE — 82947 ASSAY GLUCOSE BLOOD QUANT: CPT

## 2025-06-09 PROCEDURE — 82465 ASSAY BLD/SERUM CHOLESTEROL: CPT

## 2025-06-09 PROCEDURE — 84466 ASSAY OF TRANSFERRIN: CPT

## 2025-06-09 PROCEDURE — 82977 ASSAY OF GGT: CPT

## 2025-06-09 PROCEDURE — 86381 MITOCHONDRIAL ANTIBODY EACH: CPT

## 2025-06-10 LAB
MITOCHONDRIA M2 IGG SER-ACNC: <20 UNITS (ref 0–20)
SMA IGG SER-ACNC: 12 UNITS (ref 0–19)

## 2025-06-12 ENCOUNTER — PATIENT MESSAGE (OUTPATIENT)
Dept: BARIATRICS/WEIGHT MGMT | Facility: CLINIC | Age: 35
End: 2025-06-12
Payer: MEDICAID

## 2025-06-13 DIAGNOSIS — R79.89 ELEVATED LFTS: Primary | ICD-10-CM

## 2025-06-13 LAB
A2 MACROGLOB SERPL-MCNC: 246 MG/DL (ref 110–276)
ALT SERPL W P-5'-P-CCNC: 97 IU/L (ref 0–40)
APO A-I SERPL-MCNC: 151 MG/DL (ref 116–209)
AST SERPL W P-5'-P-CCNC: 46 IU/L (ref 0–40)
BILIRUB SERPL-MCNC: 0.2 MG/DL (ref 0–1.2)
CHOLEST SERPL-MCNC: 165 MG/DL (ref 100–199)
FIBROSIS SCORING:: ABNORMAL
FIBROSIS STAGE SERPL QL: ABNORMAL
GGT SERPL-CCNC: 70 IU/L (ref 0–60)
GLUCOSE SERPL-MCNC: 78 MG/DL (ref 70–99)
HAPTOGLOB SERPL-MCNC: 124 MG/DL (ref 33–278)
LABORATORY COMMENT REPORT: ABNORMAL
LIVER FIBR SCORE SERPL CALC.FIBROSURE: 0.12 (ref 0–0.21)
LIVER STEATOSIS GRADE SERPL QL: ABNORMAL
LIVER STEATOSIS SCORE SERPL: 0.4 (ref 0–0.4)
NASH GRADE SERPL QL: ABNORMAL
NASH INTERPRETATION SERPL-IMP: ABNORMAL
NASH SCORE SERPL: 0 (ref 0–0.25)
NASH SCORING: ABNORMAL
STEATOSIS SCORING: ABNORMAL
TEST PERFORMANCE INFO SPEC: ABNORMAL
TEST PERFORMANCE INFO SPEC: ABNORMAL
TRIGL SERPL-MCNC: 72 MG/DL (ref 0–149)

## 2025-06-18 ENCOUNTER — CLINICAL SUPPORT (OUTPATIENT)
Dept: FAMILY MEDICINE CLINIC | Facility: CLINIC | Age: 35
End: 2025-06-18
Payer: MEDICAID

## 2025-06-18 DIAGNOSIS — Z23 IMMUNIZATION DUE: ICD-10-CM

## 2025-06-18 PROCEDURE — 90471 IMMUNIZATION ADMIN: CPT | Performed by: STUDENT IN AN ORGANIZED HEALTH CARE EDUCATION/TRAINING PROGRAM

## 2025-06-18 PROCEDURE — 90651 9VHPV VACCINE 2/3 DOSE IM: CPT | Performed by: STUDENT IN AN ORGANIZED HEALTH CARE EDUCATION/TRAINING PROGRAM

## 2025-06-19 DIAGNOSIS — R79.89 LOW VITAMIN D LEVEL: ICD-10-CM

## 2025-06-21 RX ORDER — ERGOCALCIFEROL 1.25 MG/1
50000 CAPSULE, LIQUID FILLED ORAL WEEKLY
Qty: 8 CAPSULE | Refills: 0 | OUTPATIENT
Start: 2025-06-21

## 2025-06-24 ENCOUNTER — HOSPITAL ENCOUNTER (OUTPATIENT)
Dept: ULTRASOUND IMAGING | Facility: HOSPITAL | Age: 35
Discharge: HOME | End: 2025-06-24
Admitting: STUDENT IN AN ORGANIZED HEALTH CARE EDUCATION/TRAINING PROGRAM
Payer: MEDICAID

## 2025-06-24 DIAGNOSIS — R79.89 ELEVATED LFTS: ICD-10-CM

## 2025-06-24 PROCEDURE — 76705 ECHO EXAM OF ABDOMEN: CPT

## 2025-06-27 NOTE — PROGRESS NOTES
----- Message from Zachariah Bob sent at 6/24/2025  7:46 PM EDT -----  Mammogram shows the left breast to be okay but they are recommending a diagnostic right mammogram to make sure everything is okay.  Please let her know and arrange.  ----- Message -----  From: Chitra Yee MA  Sent: 6/24/2025   4:47 PM EDT  To: Zachariah Bob MD      ----- Message -----  From: Marlene, Radiology Results In  Sent: 6/23/2025   5:01 PM EDT  To: Lucien Freeman DO     Received message today from patient requesting referral to Bariatric Surgeon.   She had a sleeve gastrectomy in 2019 in [place].   She was evaluated by general surgeon a week ago for possible umbilical hernia.   She has a small port site hernia of the supraumbilical midline. He recommended waiting to do repair after she is finished with child bearing.   He recommended referral to see bariatric surgeon for further evaluation.   She is actively trying to conceive.   US of abdomen was ordered and patient apparently canceled herself.   She has been experiencing issues with GERD issues, bloating and abdominal pain.   Will referral to Bariatric surgeon - for further evaluation.  She is to go to ER for severe abdominal pain.

## 2025-06-30 ENCOUNTER — TELEPHONE (OUTPATIENT)
Dept: OBSTETRICS AND GYNECOLOGY | Facility: CLINIC | Age: 35
End: 2025-06-30
Payer: MEDICAID

## 2025-06-30 NOTE — TELEPHONE ENCOUNTER
Discussed with surgery scheduler. KOFI is out of office the day she is scheduled for sepideh guadarrama. Juan will discuss with KOFI further.

## 2025-06-30 NOTE — TELEPHONE ENCOUNTER
Caller: Riana Anthony    Relationship: Self    Best call back number: [phone]      Who are you requesting to speak with (clinical staff, DR. SMALLS      What was the call regarding: PATIENT WOULD LIKE TO HAVE DR. SMALLS CALL HER PLASTIC SURGEON'S OFFICE TO HAVE A TUMMY TUCK AT THE SAME TIME AS HER TUBAL - TUBAL DATE HAS NOT BEEN SCHEDULED.  PLEASE CALL MAAME THE SURGERY SCHEDULER AT  [phone].  PLEASE CALL PATIENT WHEN DONE.

## 2025-07-05 ENCOUNTER — PATIENT MESSAGE (OUTPATIENT)
Dept: BARIATRICS/WEIGHT MGMT | Facility: CLINIC | Age: 35
End: 2025-07-05
Payer: MEDICAID

## 2025-07-10 ENCOUNTER — PATIENT MESSAGE (OUTPATIENT)
Dept: BARIATRICS/WEIGHT MGMT | Facility: CLINIC | Age: 35
End: 2025-07-10

## 2025-07-10 ENCOUNTER — TELEMEDICINE (OUTPATIENT)
Dept: BARIATRICS/WEIGHT MGMT | Facility: CLINIC | Age: 35
End: 2025-07-10
Payer: MEDICAID

## 2025-07-10 VITALS — BODY MASS INDEX: 22.76 KG/M2 | WEIGHT: 141 LBS

## 2025-07-10 DIAGNOSIS — Z76.89 ENCOUNTER FOR WEIGHT MANAGEMENT: ICD-10-CM

## 2025-07-10 DIAGNOSIS — Z86.39 HISTORY OF OBESITY: ICD-10-CM

## 2025-07-10 DIAGNOSIS — Z30.2 ENCOUNTER FOR STERILIZATION: Primary | ICD-10-CM

## 2025-07-10 DIAGNOSIS — Z76.89 ENCOUNTER FOR WEIGHT MANAGEMENT: Primary | ICD-10-CM

## 2025-07-10 RX ORDER — SEMAGLUTIDE 1.7 MG/.75ML
1.7 INJECTION, SOLUTION SUBCUTANEOUS WEEKLY
Qty: 3 ML | Refills: 1 | Status: SHIPPED | OUTPATIENT
Start: 2025-07-10 | End: 2025-08-09

## 2025-07-10 RX ORDER — SEMAGLUTIDE 1.7 MG/.75ML
1.7 INJECTION, SOLUTION SUBCUTANEOUS WEEKLY
Qty: 3 ML | Refills: 1 | Status: SHIPPED | OUTPATIENT
Start: 2025-07-10 | End: 2025-07-10 | Stop reason: SDUPTHER

## 2025-07-10 NOTE — PROGRESS NOTES
Office Note      Date: 07/10/2025  Patient Name: Riana Anthony  MRN: [id number]  : 1990    Mode of Visit: Video  Location of patient: -HOME-  Location of provider: [facility]  You have chosen to receive care through a telehealth visit.  The patient has signed the video visit consent form.  The visit included audio and video interaction. No technical issues occurred during this visit.    Subjective     Chief Complaint  Obesity Management follow-up    Subjective          Riana Anthony presents to [facility] via telehealth for obesity management.     History of Present Illness  The patient presents via virtual visit for evaluation of weight management and bipolar disorder.    She has been experiencing feelings of sadness and depression, which she attributes to her current medication, Zepbound. Despite reading online that these side effects should subside within a few weeks, she has not noticed any improvement. She is reluctant to discontinue the medication and is considering switching to Ozempic, believing it may have fewer side effects. She is currently on Zepbound 10 mg, which she takes every Saturday. She has enough medication to last until 07/15/2025. Her current weight is approximately 141 pounds, a decrease from her previous weight of 145 pounds. She maintains a regular walking routine for exercise.    She has a history of bipolar disorder and has previously struggled with negative thoughts. She reports no suicidal or homicidal ideation but admits to persistent sad thoughts. She is currently on medication for bipolar disorder and depression. She believes her fluctuating relationship status may be contributing to her mood swings.    MEDICATIONS  Current: Zepbound, semaglutide          Objective   Body mass index is 22.76 kg/m².  Start weight: 182.2 pounds.    Total Loss lb/%Loss of beginning body weight (BBW): -41.2lb/-22%  Change in weight since last  visit: -4     Wt Readings from Last 6 Encounters:   07/10/25 0742 64 kg (141 lb)   05/13/25 0947 65.8 kg (145 lb)   04/23/25 1020 66.4 kg (146 lb 6.4 oz)   04/10/25 1057 64.6 kg (142 lb 6.4 oz)   03/17/25 0904 67.5 kg (148 lb 12.8 oz)   03/12/25 0948 70.9 kg (156 lb 6.4 oz)         General:  .well developed; well nourished  no acute distress  obese        Wt 64 kg (141 lb)   BMI 22.76 kg/m²       Result Review :     Common labs          4/10/2025    11:34 6/3/2025    08:22 6/9/2025    08:11   Common Labs   Glucose 81  72     BUN 10  14.0     Creatinine 0.84  0.82     Sodium 138  139     Potassium 4.4  4.2     Chloride 103  104     Calcium 9.4  9.2     Albumin 4.3  4.3     Total Bilirubin 0.3  0.3     Alkaline Phosphatase 97  111     AST (SGOT) 30  42     ALT (SGPT) 53  59     Triglycerides   72    Hemoglobin A1C 5.2                  Assessment and Plan        Diagnoses and all orders for this visit:    1. Encounter for weight management (Primary)  Assessment & Plan:  --Her weight has decreased by approximately 4 pounds since the last consultation on 05/13/2025. The symptoms she is experiencing are not typically associated with her current medication, Zepbound. It is more likely that these symptoms are situational rather than medication-induced. A transition from Zepbound to Wegovy at a similar dose is planned to monitor her response. She will discontinue Zepbound after her last dose and start Wegovy the following Saturday. The initial dose of Wegovy will be 1.7 mg, which can be increased to 2.4 mg if well-tolerated. She was advised to obtain a savings code for Wegovy, which can be filled out at a local pharmacy for a reduced price. The prescription for Wegovy will be sent to [facility] on [address]. If she discontinues the medication for 3 weeks or more, she should not resume the high dose immediately but start with a lower dose and gradually increase.   --She reports feeling sad and down, which may be exacerbated by her  current life stressors. She is currently on bipolar medication and an antidepressant. The potential impact of her weight management medication on her mood was discussed, but it was concluded that the medication is unlikely to be the primary cause. She was encouraged to continue her current bipolar medication and to monitor her mood closely. If her mood does not improve or worsens, further evaluation and potential adjustment of her bipolar medication may be necessary.     Orders:  -     Semaglutide-Weight Management (Wegovy) 1.7 MG/0.75ML solution auto-injector; Inject 0.75 mL under the skin into the appropriate area as directed 1 (One) Time Per Week for 30 days.  Dispense: 3 mL; Refill: 1    2. History of obesity  -     Semaglutide-Weight Management (Wegovy) 1.7 MG/0.75ML solution auto-injector; Inject 0.75 mL under the skin into the appropriate area as directed 1 (One) Time Per Week for 30 days.  Dispense: 3 mL; Refill: 1        We discussed the risks, benefits, and limitations of treatments. Continue medications and OTC supplements as discussed. Patient verbalizes understanding of and agreement with management plan.     Follow Up   Return in about 4 weeks (around 8/7/2025).  I spent 40 minutes on this date of service. This time includes time spent by me in the following activities:preparing for the visit, counseling and educating the patient/family/caregiver, ordering medications, tests, or procedures and documenting information in the medical record.  Patient was given instructions and counseling regarding her condition or for health maintenance advice. Please see specific information pulled into the AVS if appropriate.     Patient or patient representative verbalized consent for the use of Ambient Listening during the visit with  CHRIS Garcia for chart documentation. 7/10/2025  07:31 EDT    CHRIS Garcia

## 2025-07-10 NOTE — ASSESSMENT & PLAN NOTE
--Her weight has decreased by approximately 4 pounds since the last consultation on 05/13/2025. The symptoms she is experiencing are not typically associated with her current medication, Zepbound. It is more likely that these symptoms are situational rather than medication-induced. A transition from Zepbound to Wegovy at a similar dose is planned to monitor her response. She will discontinue Zepbound after her last dose and start Wegovy the following Saturday. The initial dose of Wegovy will be 1.7 mg, which can be increased to 2.4 mg if well-tolerated. She was advised to obtain a savings code for Wegovy, which can be filled out at a local pharmacy for a reduced price. The prescription for Wegovy will be sent to [facility] on [address]. If she discontinues the medication for 3 weeks or more, she should not resume the high dose immediately but start with a lower dose and gradually increase.   --She reports feeling sad and down, which may be exacerbated by her current life stressors. She is currently on bipolar medication and an antidepressant. The potential impact of her weight management medication on her mood was discussed, but it was concluded that the medication is unlikely to be the primary cause. She was encouraged to continue her current bipolar medication and to monitor her mood closely. If her mood does not improve or worsens, further evaluation and potential adjustment of her bipolar medication may be necessary.

## 2025-07-10 NOTE — PROGRESS NOTES
[facility]  [address]  [place] [address]     Office Note      Date: 07/10/2025  Patient Name: Riana Anthony  MRN: [id number]  : 1990    Subjective     History of Present Illness      Review of Systems   Constitutional:  Negative for appetite change and fatigue.   Eyes:  Negative for visual disturbance.   Cardiovascular:  Negative for chest pain and palpitations.   Gastrointestinal:  Negative for constipation and indigestion.   Neurological:  Negative for light-headedness.   Psychiatric/Behavioral:  Positive for depressed mood. Negative for self-injury and suicidal ideas.        Objective       Recent Weight History:   Wt Readings from Last 6 Encounters:   25 65.8 kg (145 lb)   25 66.4 kg (146 lb 6.4 oz)   04/10/25 64.6 kg (142 lb 6.4 oz)   25 67.5 kg (148 lb 12.8 oz)   25 70.9 kg (156 lb 6.4 oz)   25 70 kg (154 lb 6.4 oz)         There is no height or weight on file to calculate BMI.   Body composition analysis completed and showed:        Measurements (in inches)       Vital Signs:   There were no vitals taken for this visit.      Physical Exam   Result Review :{Labs  Result Review  Imaging  Med Tab  Media :23}   {The following data was reviewed by (Optional):97254}  {Ambulatory Labs (Optional):30655}           Assessment / Plan     {CC Problem List  Visit Diagnosis  ROS  Review (Popup)  Health Maintenance  Quality  BestPractice  Medications  SmartSets  SnapShot Encounters  Media :23}     Assessment & Plan  1. Weight management.  Her weight has decreased by approximately 4 pounds since the last consultation on 2025. The symptoms she is experiencing are not typically associated with her current medication, Zepbound. It is more likely that these symptoms are situational rather than medication-induced. A transition from Zepbound to Wegovy at a similar dose is planned to monitor her response. She will  discontinue Zepbound after her last dose and start Wegovy the following Saturday. The initial dose of Wegovy will be 1.7 mg, which can be increased to 2.4 mg if well-tolerated. She was advised to obtain a savings code for Wegovy, which can be filled out at a local pharmacy for a reduced price. The prescription for Wegovy will be sent to [facility] on [address]. If she discontinues the medication for 3 weeks or more, she should not resume the high dose immediately but start with a lower dose and gradually increase.    2. Bipolar disorder.  She reports feeling sad and down, which may be exacerbated by her current life stressors. She is currently on bipolar medication and an antidepressant. The potential impact of her weight management medication on her mood was discussed, but it was concluded that the medication is unlikely to be the primary cause. She was encouraged to continue her current bipolar medication and to monitor her mood closely. If her mood does not improve or worsens, further evaluation and potential adjustment of her bipolar medication may be necessary.    There are no diagnoses linked to this encounter.    We discussed the risks, benefits, and limitations of treatments. Continue medications and OTC supplements as discussed. Patient verbalizes understanding of and agreement with management plan.     Follow Up {Instructions Charge Capture  Follow-up Communications :23}  No follow-ups on file.  Patient was given instructions and counseling regarding her condition or for health maintenance advice. Please see specific information pulled into the AVS if appropriate.     I spent *** minutes on this date of service. This time includes time spent by me in the following activities:preparing for the visit, counseling and educating the patient/family/caregiver, ordering medications, tests, or procedures and documenting information in the medical record.    {[employer] Provider Statement:22123}    Ana  CHRIS Morgan  07/10/2025

## 2025-07-22 ENCOUNTER — PRE-ADMISSION TESTING (OUTPATIENT)
Dept: PREADMISSION TESTING | Facility: HOSPITAL | Age: 35
End: 2025-07-22
Payer: MEDICAID

## 2025-07-22 VITALS — BODY MASS INDEX: 26.05 KG/M2 | HEIGHT: 63 IN | WEIGHT: 147.05 LBS

## 2025-07-22 DIAGNOSIS — R79.89 ELEVATED LFTS: ICD-10-CM

## 2025-07-22 LAB
ANION GAP SERPL CALCULATED.3IONS-SCNC: 9 MMOL/L (ref 5–15)
BUN SERPL-MCNC: 9.1 MG/DL (ref 6–20)
BUN/CREAT SERPL: 13.8 (ref 7–25)
CALCIUM SPEC-SCNC: 8.7 MG/DL (ref 8.6–10.5)
CHLORIDE SERPL-SCNC: 104 MMOL/L (ref 98–107)
CO2 SERPL-SCNC: 22 MMOL/L (ref 22–29)
CREAT SERPL-MCNC: 0.66 MG/DL (ref 0.57–1)
DEPRECATED RDW RBC AUTO: 41.4 FL (ref 37–54)
EGFRCR SERPLBLD CKD-EPI 2021: 118.2 ML/MIN/1.73
ERYTHROCYTE [DISTWIDTH] IN BLOOD BY AUTOMATED COUNT: 12.6 % (ref 12.3–15.4)
GLUCOSE SERPL-MCNC: 79 MG/DL (ref 65–99)
HBA1C MFR BLD: 4.74 % (ref 4.8–5.6)
HCT VFR BLD AUTO: 38.1 % (ref 34–46.6)
HGB BLD-MCNC: 12.6 G/DL (ref 12–15.9)
MCH RBC QN AUTO: 29.4 PG (ref 26.6–33)
MCHC RBC AUTO-ENTMCNC: 33.1 G/DL (ref 31.5–35.7)
MCV RBC AUTO: 88.8 FL (ref 79–97)
PLATELET # BLD AUTO: 337 10*3/MM3 (ref 140–450)
PMV BLD AUTO: 9.1 FL (ref 6–12)
POTASSIUM SERPL-SCNC: 4.3 MMOL/L (ref 3.5–5.2)
RBC # BLD AUTO: 4.29 10*6/MM3 (ref 3.77–5.28)
SODIUM SERPL-SCNC: 135 MMOL/L (ref 136–145)
WBC NRBC COR # BLD AUTO: 6.44 10*3/MM3 (ref 3.4–10.8)

## 2025-07-22 PROCEDURE — 80048 BASIC METABOLIC PNL TOTAL CA: CPT

## 2025-07-22 PROCEDURE — 36415 COLL VENOUS BLD VENIPUNCTURE: CPT

## 2025-07-22 PROCEDURE — 85027 COMPLETE CBC AUTOMATED: CPT

## 2025-07-22 PROCEDURE — 83036 HEMOGLOBIN GLYCOSYLATED A1C: CPT

## 2025-07-22 PROCEDURE — 80076 HEPATIC FUNCTION PANEL: CPT

## 2025-07-22 RX ORDER — MUPIROCIN 2 %
OINTMENT (GRAM) TOPICAL
COMMUNITY
Start: 2025-07-17

## 2025-07-22 RX ORDER — DOCUSATE SODIUM 100 MG/1
1 CAPSULE, LIQUID FILLED ORAL EVERY 12 HOURS SCHEDULED
COMMUNITY
Start: 2025-07-17

## 2025-07-22 RX ORDER — OXYCODONE HYDROCHLORIDE 5 MG/1
1 TABLET ORAL
COMMUNITY
Start: 2025-07-17

## 2025-07-22 RX ORDER — ONDANSETRON 4 MG/1
TABLET, ORALLY DISINTEGRATING ORAL
COMMUNITY
Start: 2025-07-17

## 2025-07-22 RX ORDER — QUETIAPINE FUMARATE 50 MG/1
TABLET, FILM COATED ORAL
COMMUNITY

## 2025-07-22 RX ORDER — CEPHALEXIN 500 MG/1
1 CAPSULE ORAL 3 TIMES DAILY
COMMUNITY
Start: 2025-07-17

## 2025-07-22 NOTE — PAT
An arrival time for procedure was not provided during PAT visit. If patient had any questions or concerns about their arrival time, they were instructed to contact their surgeon/physician.  Additionally, if the patient referred to an arrival time that was acquired from their my chart account, patient was encouraged to verify that time with their surgeon/physician. Arrival times are NOT provided in Pre Admission Testing Department.    Patient viewed general PAT education video as instructed in their preoperative information received from their surgeon.  Patient stated the general PAT education video was viewed in its entirety and survey completed.  Copies of PAT general education handouts (Incentive Spirometry, Meds to Beds Program, Patient Belongings, Pre-op skin preparation instructions, Blood Glucose testing, Visitor policy, Surgery FAQ, Code H) distributed to patient if not printed. Education related to the PAT pass and skin preparation for surgery (if applicable) completed in PAT as a reinforcement to PAT education video. Patient instructed to return PAT pass provided today as well as completed skin preparation sheet (if applicable) on the day of procedure.     Additionally if patient had not viewed video yet but intended to view it at home or in our waiting area, then referred them to the handout with QR code/link provided during PAT visit.  Encouraged patient/family to read PAT general education handouts thoroughly and notify PAT staff with any questions or concerns. Patient verbalized understanding of all information and priority content.    Patient denies any current skin issues.     Patient instructed to drink 20 ounces of Gatorade or Gatorlyte (if diabetic) and it needs to be completed 1 hour (for Main OR patients) or 2 hours (scheduled  section & [facility] patients) before given arrival time for procedure (NO RED Gatorade and NO Gatorade Zero).    Patient verbalized understanding.    Patient to apply  Chlorhexadine wipes  to surgical area (as instructed) the night before procedure and the AM of procedure. Wipes provided.    Incorrect order or no order for procedure consent.  Please obtain procedure consent on the day of procedure.- PATIENT TO MEET WITH DR. SMALLS PRIOR TO SURGERY TO DISCUSS PROCEDURE.  WOULD LIKE TO WAIT UNITL AFTER TO SIGN CONSENT.

## 2025-07-23 ENCOUNTER — TELEPHONE (OUTPATIENT)
Dept: OBSTETRICS AND GYNECOLOGY | Facility: CLINIC | Age: 35
End: 2025-07-23
Payer: MEDICAID

## 2025-07-23 DIAGNOSIS — R79.89 ELEVATED LFTS: Primary | ICD-10-CM

## 2025-07-23 LAB
ALBUMIN SERPL-MCNC: 4.1 G/DL (ref 3.5–5.2)
ALP SERPL-CCNC: 71 U/L (ref 39–117)
ALT SERPL W P-5'-P-CCNC: 33 U/L (ref 1–33)
AST SERPL-CCNC: 27 U/L (ref 1–32)
BILIRUB CONJ SERPL-MCNC: 0.2 MG/DL (ref 0–0.3)
BILIRUB INDIRECT SERPL-MCNC: 0.2 MG/DL
BILIRUB SERPL-MCNC: 0.4 MG/DL (ref 0–1.2)
PROT SERPL-MCNC: 7 G/DL (ref 6–8.5)

## 2025-07-23 NOTE — TELEPHONE ENCOUNTER
Returned patient's call.   Informed her that I do not see anything in her chart that we tried to call her. She v/u.

## 2025-07-23 NOTE — TELEPHONE ENCOUNTER
Caller: Riana Anthony    Relationship: Self    Best call back number: [phone]    What is the best time to reach you: ANYTIME    Who are you requesting to speak with (clinical staff, provider,  specific staff member): STAFF    Do you know the name of the person who called: NO    What was the call regarding: UNKNOWN - DID NOT LEAVE MESSAGE     Is it okay if the provider responds through [employer]: YES

## 2025-07-24 ENCOUNTER — TELEPHONE (OUTPATIENT)
Dept: OBSTETRICS AND GYNECOLOGY | Facility: CLINIC | Age: 35
End: 2025-07-24
Payer: MEDICAID

## 2025-07-24 NOTE — TELEPHONE ENCOUNTER
Shannan pt. Pt called stating that she no longer wants bilateral salpingectomy. She states that she thought it was reversible and doesn't want permanent sterilization. Pt wanted to know if she still would have her cosmetic surgery at 730 on 7/29/25. Called surgery center and confirmed that she is scheduled for cosmetic surgery at that time and date. Informed surgery center that she will no longer be having the bilateral salpingectomy. Surgery center stated that they would remove that procedure off the schedule and make a note. Patient notified. Our office surgery coordinator, Perla Espinal stated that she will contact Dr. Heaton's office to make them aware. Pt wishes to have Kyleena IUD inserted. Her pre-op appointment has been changed to IUD insertion appointment.

## 2025-07-24 NOTE — TELEPHONE ENCOUNTER
PT CALLED AND STATED THAT THE TUBAL LIGATION PT DOES NOT WANT IT TO BE PERMANENT SHE STATES SHE ASKED FOR HER TUBES TO BE TIED NOT REMOVED PT STATES THAT IF THERE IS NOT A PROCEDURE THAT CAN BE REVERSED SHE DOES NOT WANT IT PLEASE ADVISE

## 2025-07-28 ENCOUNTER — ANESTHESIA EVENT (OUTPATIENT)
Dept: PERIOP | Facility: HOSPITAL | Age: 35
End: 2025-07-28
Payer: MEDICAID

## 2025-07-28 ENCOUNTER — OFFICE VISIT (OUTPATIENT)
Dept: OBSTETRICS AND GYNECOLOGY | Facility: CLINIC | Age: 35
End: 2025-07-28
Payer: MEDICAID

## 2025-07-28 VITALS
HEIGHT: 63 IN | WEIGHT: 147 LBS | DIASTOLIC BLOOD PRESSURE: 70 MMHG | BODY MASS INDEX: 26.05 KG/M2 | SYSTOLIC BLOOD PRESSURE: 110 MMHG

## 2025-07-28 DIAGNOSIS — Z32.02 PREGNANCY EXAMINATION OR TEST, NEGATIVE RESULT: ICD-10-CM

## 2025-07-28 DIAGNOSIS — Z30.430 ENCOUNTER FOR IUD INSERTION: Primary | ICD-10-CM

## 2025-07-28 DIAGNOSIS — Z30.431 SURVEILLANCE OF INTRAUTERINE CONTRACEPTIVE DEVICE: ICD-10-CM

## 2025-07-28 PROCEDURE — 81025 URINE PREGNANCY TEST: CPT | Performed by: ADVANCED PRACTICE MIDWIFE

## 2025-07-28 PROCEDURE — 58300 INSERT INTRAUTERINE DEVICE: CPT | Performed by: ADVANCED PRACTICE MIDWIFE

## 2025-07-28 PROCEDURE — 1160F RVW MEDS BY RX/DR IN RCRD: CPT | Performed by: ADVANCED PRACTICE MIDWIFE

## 2025-07-28 PROCEDURE — 1159F MED LIST DOCD IN RCRD: CPT | Performed by: ADVANCED PRACTICE MIDWIFE

## 2025-07-28 RX ORDER — FAMOTIDINE 10 MG/ML
20 INJECTION, SOLUTION INTRAVENOUS ONCE
Status: CANCELLED | OUTPATIENT
Start: 2025-07-28 | End: 2025-07-28

## 2025-07-28 NOTE — PROGRESS NOTES
"     Gynecologic Procedure Note        Procedure: IUD Insertion     Procedures    Pre procedure indication 1) Desires Kyleena  Post procedure indication 1) Desires Kyleena    ND: Kyleena  [id number]  Lot #: [id number]  Exp Date: 08/2027  BH device    /70   Ht 160 cm (63\")   Wt 66.7 kg (147 lb)   LMP 07/13/2025 (Exact Date)   BMI 26.04 kg/m²       The risks, benefits, and alternatives to Kyleena were explained at length with the patient. All her questions were answered and consents were signed.  Her LMP was 7/13/25.  Urine Pregnancy Test was Negative.  Patient does not have an allergy to betadine or shellfish.    Time out: immediate members of the procedure team and patient agree to the following: correct patient, correct site, correct procedure to be performed. CHRIS Boykin      The patient was placed in a dorsal lithotomy position on the examining table in stirrups. A speculum was inserted into the vagina and the cervix was brought into view.  The cervix was prepped with Betadine. The anterior lip of the cervix was then grasped with an Allis clamp. The endometrial cavity was then sounded to 9 centimeters. The sealed Kyleena package was opened and the IUD was removed in a sterile fashion.    The upper edge of the depth setting the flange was set at the uterine sound measurement. The  was then carefully advanced to the cervical canal into the uterus to the level of the fundus.  The slider was then retracted about 1 cm and deployed the device. The device was then gently advanced to the fundus. The IUD was then released by pulling the slider down all the way. The  was removed carefully from the uterus. The threads were then cut leaving 2-3 cm visible outside of the cervix.  The Allis clamp was removed from the anterior lip, there was no bleeding.   All other instruments were removed from the vagina.       The patient tolerated the procedure well with a moderate amount of discomfort.  She " was monitored for 5 minutes prior to discharge.      There were no complications.    The patient was counseled about the need to return in 4 weeks for IUD check.     She was counseled about the need to use a backup method of contraception such as condoms until her post insertion exam was performed. The patient verbalized understanding when the IUD will need to be removed/replaced. Written information was given to the patient.  The patient is counseled to contact us if she has any significant or increasing bleeding, pain, fever, chills, or other concerns. She is instructed to see a doctor right away if she believes that she may be pregnant at any time with the IUD in place.    Return in about 4 weeks (around 8/25/2025) for ultrasound.      CHRIS Boykin  07/28/2025

## 2025-07-29 ENCOUNTER — ANESTHESIA (OUTPATIENT)
Dept: PERIOP | Facility: HOSPITAL | Age: 35
End: 2025-07-29
Payer: MEDICAID

## 2025-07-29 ENCOUNTER — HOSPITAL ENCOUNTER (OUTPATIENT)
Facility: HOSPITAL | Age: 35
Setting detail: HOSPITAL OUTPATIENT SURGERY
Discharge: HOME | End: 2025-07-29
Attending: PLASTIC SURGERY | Admitting: PLASTIC SURGERY
Payer: MEDICAID

## 2025-07-29 VITALS
HEART RATE: 72 BPM | HEIGHT: 66 IN | WEIGHT: 147 LBS | DIASTOLIC BLOOD PRESSURE: 67 MMHG | RESPIRATION RATE: 16 BRPM | OXYGEN SATURATION: 99 % | BODY MASS INDEX: 23.63 KG/M2 | SYSTOLIC BLOOD PRESSURE: 101 MMHG | TEMPERATURE: 98 F

## 2025-07-29 PROCEDURE — 25810000003 LACTATED RINGERS PER 1000 ML: Performed by: ANESTHESIOLOGY

## 2025-07-29 PROCEDURE — 25010000002 HYDROMORPHONE 1 MG/ML SOLUTION: Performed by: NURSE ANESTHETIST, CERTIFIED REGISTERED

## 2025-07-29 PROCEDURE — 25010000002 LIDOCAINE 1% - EPINEPHRINE 1:100000 1 %-1:100000 SOLUTION 50 ML VIAL: Performed by: PLASTIC SURGERY

## 2025-07-29 PROCEDURE — 25010000002 HYDROMORPHONE 1 MG/ML SOLUTION

## 2025-07-29 PROCEDURE — 25010000002 SUGAMMADEX 200 MG/2ML SOLUTION: Performed by: NURSE ANESTHETIST, CERTIFIED REGISTERED

## 2025-07-29 PROCEDURE — 25010000002 LIDOCAINE PF 1% 1 % SOLUTION: Performed by: ANESTHESIOLOGY

## 2025-07-29 PROCEDURE — 25010000002 GENTAMICIN PER 80 MG: Performed by: PLASTIC SURGERY

## 2025-07-29 PROCEDURE — 25010000002 PROPOFOL 10 MG/ML EMULSION: Performed by: NURSE ANESTHETIST, CERTIFIED REGISTERED

## 2025-07-29 PROCEDURE — 25010000002 ONDANSETRON PER 1 MG: Performed by: NURSE ANESTHETIST, CERTIFIED REGISTERED

## 2025-07-29 PROCEDURE — 25010000002 DEXAMETHASONE PER 1 MG: Performed by: NURSE ANESTHETIST, CERTIFIED REGISTERED

## 2025-07-29 PROCEDURE — 25010000002 LIDOCAINE PF 1% 1 % SOLUTION: Performed by: NURSE ANESTHETIST, CERTIFIED REGISTERED

## 2025-07-29 PROCEDURE — 25010000002 CEFAZOLIN PER 500 MG: Performed by: OBSTETRICS & GYNECOLOGY

## 2025-07-29 PROCEDURE — 25010000002 CEFAZOLIN PER 500 MG: Performed by: PLASTIC SURGERY

## 2025-07-29 PROCEDURE — 81025 URINE PREGNANCY TEST: CPT | Performed by: ANESTHESIOLOGY

## 2025-07-29 PROCEDURE — 25010000002 MIDAZOLAM PER 1 MG: Performed by: NURSE ANESTHETIST, CERTIFIED REGISTERED

## 2025-07-29 PROCEDURE — 25810000003 SODIUM CHLORIDE PER 500 ML: Performed by: PLASTIC SURGERY

## 2025-07-29 PROCEDURE — 25010000002 FENTANYL CITRATE (PF) 100 MCG/2ML SOLUTION: Performed by: NURSE ANESTHETIST, CERTIFIED REGISTERED

## 2025-07-29 RX ORDER — MIDAZOLAM HYDROCHLORIDE 1 MG/ML
INJECTION, SOLUTION INTRAMUSCULAR; INTRAVENOUS AS NEEDED
Status: DISCONTINUED | OUTPATIENT
Start: 2025-07-29 | End: 2025-07-29 | Stop reason: SURG

## 2025-07-29 RX ORDER — DEXAMETHASONE SODIUM PHOSPHATE 4 MG/ML
INJECTION, SOLUTION INTRA-ARTICULAR; INTRALESIONAL; INTRAMUSCULAR; INTRAVENOUS; SOFT TISSUE AS NEEDED
Status: DISCONTINUED | OUTPATIENT
Start: 2025-07-29 | End: 2025-07-29 | Stop reason: SURG

## 2025-07-29 RX ORDER — LIDOCAINE HYDROCHLORIDE 10 MG/ML
0.5 INJECTION, SOLUTION EPIDURAL; INFILTRATION; INTRACAUDAL; PERINEURAL ONCE AS NEEDED
Status: COMPLETED | OUTPATIENT
Start: 2025-07-29 | End: 2025-07-29

## 2025-07-29 RX ORDER — FENTANYL CITRATE 50 UG/ML
50 INJECTION, SOLUTION INTRAMUSCULAR; INTRAVENOUS
Status: DISCONTINUED | OUTPATIENT
Start: 2025-07-29 | End: 2025-07-29 | Stop reason: HOSPADM

## 2025-07-29 RX ORDER — LIDOCAINE HYDROCHLORIDE 10 MG/ML
INJECTION, SOLUTION EPIDURAL; INFILTRATION; INTRACAUDAL; PERINEURAL AS NEEDED
Status: DISCONTINUED | OUTPATIENT
Start: 2025-07-29 | End: 2025-07-29 | Stop reason: SURG

## 2025-07-29 RX ORDER — ONDANSETRON 2 MG/ML
INJECTION INTRAMUSCULAR; INTRAVENOUS AS NEEDED
Status: DISCONTINUED | OUTPATIENT
Start: 2025-07-29 | End: 2025-07-29 | Stop reason: SURG

## 2025-07-29 RX ORDER — OXYCODONE HYDROCHLORIDE 5 MG/1
TABLET ORAL
Status: COMPLETED
Start: 2025-07-29 | End: 2025-07-29

## 2025-07-29 RX ORDER — IPRATROPIUM BROMIDE AND ALBUTEROL SULFATE 2.5; .5 MG/3ML; MG/3ML
3 SOLUTION RESPIRATORY (INHALATION) ONCE AS NEEDED
Status: DISCONTINUED | OUTPATIENT
Start: 2025-07-29 | End: 2025-07-29 | Stop reason: HOSPADM

## 2025-07-29 RX ORDER — EPHEDRINE SULFATE 50 MG/ML
INJECTION INTRAVENOUS AS NEEDED
Status: DISCONTINUED | OUTPATIENT
Start: 2025-07-29 | End: 2025-07-29 | Stop reason: SURG

## 2025-07-29 RX ORDER — MIDAZOLAM HYDROCHLORIDE 1 MG/ML
1 INJECTION, SOLUTION INTRAMUSCULAR; INTRAVENOUS
Status: DISCONTINUED | OUTPATIENT
Start: 2025-07-29 | End: 2025-07-29 | Stop reason: HOSPADM

## 2025-07-29 RX ORDER — SODIUM CHLORIDE 0.9 % (FLUSH) 0.9 %
10 SYRINGE (ML) INJECTION AS NEEDED
Status: DISCONTINUED | OUTPATIENT
Start: 2025-07-29 | End: 2025-07-29 | Stop reason: HOSPADM

## 2025-07-29 RX ORDER — SODIUM CHLORIDE 9 MG/ML
INJECTION, SOLUTION INTRAVENOUS AS NEEDED
Status: DISCONTINUED | OUTPATIENT
Start: 2025-07-29 | End: 2025-07-29 | Stop reason: HOSPADM

## 2025-07-29 RX ORDER — HYDROMORPHONE HYDROCHLORIDE 1 MG/ML
0.5 INJECTION, SOLUTION INTRAMUSCULAR; INTRAVENOUS; SUBCUTANEOUS
Status: DISCONTINUED | OUTPATIENT
Start: 2025-07-29 | End: 2025-07-29 | Stop reason: HOSPADM

## 2025-07-29 RX ORDER — ONDANSETRON 2 MG/ML
4 INJECTION INTRAMUSCULAR; INTRAVENOUS ONCE AS NEEDED
Status: DISCONTINUED | OUTPATIENT
Start: 2025-07-29 | End: 2025-07-29 | Stop reason: HOSPADM

## 2025-07-29 RX ORDER — FENTANYL CITRATE 50 UG/ML
INJECTION, SOLUTION INTRAMUSCULAR; INTRAVENOUS AS NEEDED
Status: DISCONTINUED | OUTPATIENT
Start: 2025-07-29 | End: 2025-07-29 | Stop reason: SURG

## 2025-07-29 RX ORDER — OXYCODONE HYDROCHLORIDE 5 MG/1
5 TABLET ORAL ONCE AS NEEDED
Refills: 0 | Status: COMPLETED | OUTPATIENT
Start: 2025-07-29 | End: 2025-07-29

## 2025-07-29 RX ORDER — SODIUM CHLORIDE, SODIUM LACTATE, POTASSIUM CHLORIDE, CALCIUM CHLORIDE 600; 310; 30; 20 MG/100ML; MG/100ML; MG/100ML; MG/100ML
9 INJECTION, SOLUTION INTRAVENOUS CONTINUOUS
Status: DISCONTINUED | OUTPATIENT
Start: 2025-07-30 | End: 2025-07-29 | Stop reason: HOSPADM

## 2025-07-29 RX ORDER — PROPOFOL 10 MG/ML
VIAL (ML) INTRAVENOUS AS NEEDED
Status: DISCONTINUED | OUTPATIENT
Start: 2025-07-29 | End: 2025-07-29 | Stop reason: SURG

## 2025-07-29 RX ORDER — ROCURONIUM BROMIDE 10 MG/ML
INJECTION, SOLUTION INTRAVENOUS AS NEEDED
Status: DISCONTINUED | OUTPATIENT
Start: 2025-07-29 | End: 2025-07-29 | Stop reason: SURG

## 2025-07-29 RX ORDER — SODIUM CHLORIDE 0.9 % (FLUSH) 0.9 %
10 SYRINGE (ML) INJECTION EVERY 12 HOURS SCHEDULED
Status: DISCONTINUED | OUTPATIENT
Start: 2025-07-29 | End: 2025-07-29 | Stop reason: HOSPADM

## 2025-07-29 RX ORDER — FAMOTIDINE 20 MG/1
20 TABLET, FILM COATED ORAL ONCE
Status: COMPLETED | OUTPATIENT
Start: 2025-07-29 | End: 2025-07-29

## 2025-07-29 RX ADMIN — SODIUM CHLORIDE, POTASSIUM CHLORIDE, SODIUM LACTATE AND CALCIUM CHLORIDE 9 ML/HR: 600; 310; 30; 20 INJECTION, SOLUTION INTRAVENOUS at 07:10

## 2025-07-29 RX ADMIN — SODIUM CHLORIDE 2000 MG: 900 INJECTION INTRAVENOUS at 11:01

## 2025-07-29 RX ADMIN — MIDAZOLAM HYDROCHLORIDE 2 MG: 1 INJECTION, SOLUTION INTRAMUSCULAR; INTRAVENOUS at 07:49

## 2025-07-29 RX ADMIN — HYDROMORPHONE HYDROCHLORIDE 1 MG: 1 INJECTION, SOLUTION INTRAMUSCULAR; INTRAVENOUS; SUBCUTANEOUS at 10:01

## 2025-07-29 RX ADMIN — SODIUM CHLORIDE, POTASSIUM CHLORIDE, SODIUM LACTATE AND CALCIUM CHLORIDE: 600; 310; 30; 20 INJECTION, SOLUTION INTRAVENOUS at 10:40

## 2025-07-29 RX ADMIN — SUGAMMADEX 200 MG: 100 INJECTION, SOLUTION INTRAVENOUS at 11:52

## 2025-07-29 RX ADMIN — HYDROMORPHONE HYDROCHLORIDE 0.5 MG: 1 INJECTION, SOLUTION INTRAMUSCULAR; INTRAVENOUS; SUBCUTANEOUS at 12:30

## 2025-07-29 RX ADMIN — PROPOFOL 25 MCG/KG/MIN: 10 INJECTION, EMULSION INTRAVENOUS at 08:00

## 2025-07-29 RX ADMIN — FENTANYL CITRATE 100 MCG: 50 INJECTION, SOLUTION INTRAMUSCULAR; INTRAVENOUS at 07:49

## 2025-07-29 RX ADMIN — ROCURONIUM BROMIDE 50 MG: 10 INJECTION INTRAVENOUS at 07:53

## 2025-07-29 RX ADMIN — LIDOCAINE HYDROCHLORIDE 0.5 ML: 10 INJECTION, SOLUTION EPIDURAL; INFILTRATION; INTRACAUDAL; PERINEURAL at 07:10

## 2025-07-29 RX ADMIN — ONDANSETRON 4 MG: 2 INJECTION INTRAMUSCULAR; INTRAVENOUS at 11:52

## 2025-07-29 RX ADMIN — OXYCODONE HYDROCHLORIDE 5 MG: 5 TABLET ORAL at 13:27

## 2025-07-29 RX ADMIN — HYDROMORPHONE HYDROCHLORIDE 0.5 MG: 1 INJECTION, SOLUTION INTRAMUSCULAR; INTRAVENOUS; SUBCUTANEOUS at 12:47

## 2025-07-29 RX ADMIN — SODIUM CHLORIDE 2000 MG: 900 INJECTION INTRAVENOUS at 07:59

## 2025-07-29 RX ADMIN — LIDOCAINE HYDROCHLORIDE 50 MG: 10 INJECTION, SOLUTION EPIDURAL; INFILTRATION; INTRACAUDAL; PERINEURAL at 07:53

## 2025-07-29 RX ADMIN — OXYCODONE 5 MG: 5 TABLET ORAL at 13:27

## 2025-07-29 RX ADMIN — ROCURONIUM BROMIDE 50 MG: 10 INJECTION INTRAVENOUS at 10:00

## 2025-07-29 RX ADMIN — DEXAMETHASONE SODIUM PHOSPHATE 8 MG: 4 INJECTION, SOLUTION INTRA-ARTICULAR; INTRALESIONAL; INTRAMUSCULAR; INTRAVENOUS; SOFT TISSUE at 08:00

## 2025-07-29 RX ADMIN — SODIUM CHLORIDE, POTASSIUM CHLORIDE, SODIUM LACTATE AND CALCIUM CHLORIDE: 600; 310; 30; 20 INJECTION, SOLUTION INTRAVENOUS at 09:01

## 2025-07-29 RX ADMIN — EPHEDRINE SULFATE 10 MG: 50 INJECTION INTRAVENOUS at 08:39

## 2025-07-29 RX ADMIN — PROPOFOL 250 MG: 10 INJECTION, EMULSION INTRAVENOUS at 07:53

## 2025-07-29 RX ADMIN — FAMOTIDINE 20 MG: 20 TABLET, FILM COATED ORAL at 07:14

## 2025-07-29 NOTE — OP NOTE
DATE OF PROCEDURE: 07/29/25    PREOPERATIVE DIAGNOSIS:   1. Encounter for cosmetic surgery  2. Right breast asymmetry status post augmentation  3. Excess abdominal skin status post abdominoplasty and pregnancy     POSTOPERATIVE DIAGNOSIS:   1. Encounter for cosmetic surgery  2. Right breast asymmetry status post augmentation  3. Excess abdominal skin status post abdominoplasty and pregnancy     PROCEDURES PERFORMED:  1. Revision of bilateral breast augmentation with larger silicone implants.   2. Bilateral open breast capsulotomies  3. Revision abdominoplasty    SURGEON: Frederick Betts MD    ASSISTANT: Assistant: Amrita Anaya PA-C     Circulator: Diane Jennings RN; Marian Florez RN; Kiara Stafford RN  Scrub Person: Sandy Tamez; Flip Alaniz  Assistant: Amrita Anaya PA-C     Assistant: Amrita Anaya PA-C  was responsible for performing the following activities: Suturing and their skilled assistance was necessary for the success of this case.    ANESTHESIA: General.    INDICATIONS: The patient is a 35-year-old female who had previous bilateral breast augmentation over 5 years ago. She also had an abdominoplasty at that time. She subsequently got pregnant and had changes to her results. She presented with capsular contracture and asymmetry on the right. She desired correction of her capsular contracture and new larger silicone implants. She was taken to the operating room today for removal of bilateral breast implants and placement of new permanent breast implants. She also desired excision of her excess abdominal skin to make her abdomen look better with a revision abdominoplasty. The patient understood all of the risks and benefits of the procedure, including infection, need for future surgeries and elected to proceed.      FINDINGS:  1.  Placement of an Sientra-style 106  cc silicone implant on the right with a serial number of [id number].  2.  Placement of an Sientra-style 106  cc silicone implant on the left with a serial number of [id number].  3. Removal of 0.8 pounds of abdominal skin.       DESCRIPTION OF PROCEDURE: The patient was seen in preoperative holding and then marked in a standing position and taken to the operating room by anesthesia after informed consent was signed and on the chart. The patient was placed supine on the operating room table and general anesthesia was induced. Once verified, the case was then turned over to the surgical service. The patient had her chest and abdomen prepped and draped in a normal sterile fashion. A timeout was called and all parties were in agreement including nursing and anesthesia. Preoperative antibiotics were given. We began on the right side with a #15 blade scalpel making an incision with Bovie cautery down to the capsule. The old silicone implant was encountered. The capsule was intact but tight. The implant was still intact. I removed the implant from the pocket. It was a silicone implant of 355 cc. The pocket was inspected and noted to be in tight position. Copious antibiotic irrigation was used in the pocket, and then a significant open capsulotomy was performed on the right. I then moved to the left side and removed the old implant in a similar fashion. Also a 355 MP implant She was noted to have a wide capsule.  We therefore performed a significant open periprosthetic capsulotomy to tightne the capsule with a thermal capsulorraphy.  Again, multiple liters of antibiotic irrigation were used in the pocket. I then scored the entire capsule both inferiorly, anteriorly and superiorly all over to allow it to expand better. This was done several times, even after placing sizers to try to expand the skin as best as possible. I placed multiple different sizers and settled with the aforementioned implants of 525 mL implant on the right and a 525 mL on the left for symmetry purposes as she wanted to be  much larger. At this point, the capsules were then closed with 2-0 Vicryl in an interrupted fashion and then a 3-0 Monocryl and a 4-0 Monocryl to close the skin.    We next moved to the revision abdominoplasty portion of the case. She had lower excess abdominal skin after pregnancy. Then after making the lower abdominal incision with a 10 blade scalpel we disected up to the xiphoid process after freeing up her umbilicus to essentially redo her entire abdominoplasty. I did not perform the plication portion of the case because this was already done. I then flexed the table at her waist and performed progressive tension sutures with a 2-0 vicryl in multiple different rows. We then excised a large section of her abdominal skin. Bovie cautery was used for bleeding. We closed the incision after placing one 15 [place] drains. The incision was closed first with 2-0 vicryl in Mitul's fascia. Then 3-0 monocryl, Insorb stapler and  A 3-0 Stratafix suture in a running fashion. The umbilicus was brought out through a new position on her abdominal skin and sutured in to place with 4-0 Monocryl and a 5-0 Monocyrl. Sylke wound closure device was placed on all incisions.     She then had Kerlix fluffs and a surgical bra placed. She had an abdominal binder placed as well.  She was awakened, extubated and taken to PACU in stable condition. All sponge and instrument counts were correct. I, Dr. Frederick Betts, was present and scrubbed for the entire procedure.      SPECIMENS: None.         ESTIMATED BLOOD LOSS: 30cc.     DRAINS: JPx1.      COMPLICATIONS: None immediate.       Frederick Betts MD  07/29/25  12:24 EDT

## 2025-07-29 NOTE — BRIEF OP NOTE
ABDOMINOPLASTY, BREAST AUGMENTATION  Progress Note    Riana Dwyer Ahmad  7/29/2025    Pre-op Diagnosis:   Z41.1       Post-Op Diagnosis Codes:  1. Encounter for cosmetic surgery  2. Right breast asymmetry  3. Excess abdominal skin     Procedure(s):      Procedure(s):  MINI ABDOMINOPLASTY  BILATERAL BREAST AUGMENTATION REVISION              Surgeon(s):  Frederick Betts MD    Anesthesia: General    Staff:   Circulator: Diane Jennings RN; Marian Florez RN; Kiara Stafford RN  Scrub Person: Sandy Tamez; Flip Alaniz  Assistant: Amrita Anaya PA-C  Assistant: Amrita Anaya PA-C    Estimated Blood Loss: 20 cc    Urine Voided: 150 mL    Specimens:                None      Drains:   Closed/Suction Drain 1 Right;Anterior Abdomen Bulb 15 Fr. (Active)       Urethral Catheter Silicone 16 Fr. (Active)       Findings: see op note      Complications: none immediate     Assistant: Amrita Anaya PA-C  was responsible for performing the following activities: Suturing and their skilled assistance was necessary for the success of this case.    Frederick Betts MD     Date: 7/29/2025  Time: 12:23 EDT

## 2025-07-29 NOTE — NURSING NOTE
.Caregiver Telephone Number    Phone Number for Ride/Caregiver: -JUSTINA WAYLONMARTIN [phone]

## 2025-07-29 NOTE — ANESTHESIA PREPROCEDURE EVALUATION
Anesthesia Evaluation     Patient summary reviewed and Nursing notes reviewed   NPO Solid Status: > 8 hours  NPO Liquid Status: > 2 hours           Airway   Mallampati: I  TM distance: >3 FB  Neck ROM: full  No difficulty expected  Dental      Pulmonary     breath sounds clear to auscultation  Cardiovascular     Rhythm: regular  Rate: normal    (+) hyperlipidemia      Neuro/Psych  (+) psychiatric history Depression  GI/Hepatic/Renal/Endo    (+) GERD    Musculoskeletal     (+) back pain  Abdominal    Substance History      OB/GYN          Other        ROS/Med Hx Other: Last use GLP 1 2 weeks prior              Anesthesia Plan    ASA 2     general     intravenous induction     Anesthetic plan, risks, benefits, and alternatives have been provided, discussed and informed consent has been obtained with: patient.    Plan discussed with CRNA.    CODE STATUS:

## 2025-07-29 NOTE — ANESTHESIA POSTPROCEDURE EVALUATION
Patient: Riana Anthony    Procedure Summary       Date: 07/29/25 Room / Location:  [facility]    Anesthesia Start: 0749 Anesthesia Stop: 1218    Procedures:       MINI ABDOMINOPLASTY (Abdomen)      BILATERAL BREAST AUGMENTATION REVISION (Bilateral: Breast) Diagnosis: (Z41.1)    Surgeons: Frederick Betts MD Provider: Chirag Fulton MD    Anesthesia Type: general ASA Status: 2            Anesthesia Type: general    Vitals  Vitals Value Taken Time   BP     Temp     Pulse 86 07/29/25 12:17   Resp     SpO2 97 % 07/29/25 12:17   Vitals shown include unfiled device data.        Post Anesthesia Care and Evaluation    Patient location during evaluation: PACU  Patient participation: complete - patient participated  Level of consciousness: awake  Pain score: 0  Pain management: adequate    Airway patency: patent  Anesthetic complications: No anesthetic complications  PONV Status: none  Cardiovascular status: acceptable and stable  Respiratory status: nasal cannula, unassisted, acceptable and spontaneous ventilation  Hydration status: acceptable

## 2025-07-29 NOTE — ANESTHESIA PROCEDURE NOTES
Airway  Reason: elective    Date/Time: 7/29/2025 7:55 AM  Airway not difficult    General Information and Staff    Patient location during procedure: OR  CRNA/CAA: Dinesh Israel CRNA    Indications and Patient Condition  Indications for airway management: airway protection    Preoxygenated: yes  MILS not maintained throughout    Mask difficulty assessment: 1 - vent by mask    Final Airway Details    Final airway type: endotracheal airway      Successful airway: ETT  Cuffed: yes   Successful intubation technique: video laryngoscopy  Adjuncts used in placement: intubating stylet  Endotracheal tube insertion site: oral  Blade: Walker  Blade size: 3  ETT size (mm): 7.0  Cormack-Lehane Classification: grade I - full view of glottis  Placement verified by: chest auscultation and capnometry   Cuff volume (mL): 5  Measured from: lips  ETT/EBT  to lips (cm): 21  Number of attempts at approach: 1  Assessment: lips, teeth, and gum same as pre-op and atraumatic intubation    Additional Comments  Negative epigastric sounds, Breath sound equal bilaterally with symmetric chest rise and fall

## 2025-07-29 NOTE — H&P
Pre-Op H&P  Riana Dwyer Ahmad  [id number]  1990    Chief complaint: encounter for cosmetic surgery    HPI:    Patient is a 35 y.o.female who presents for an encounter for cosmetic surgery. She is scheduled for a mini abdominoplasty and a bilateral breast augmentation revision. She does not take any blood thinners routinely.      Review of Systems:  General ROS: negative for chills, fever or skin lesions.  Cardiovascular ROS: no chest pain or dyspnea on exertion  Respiratory ROS: no cough, shortness of breath, or wheezing    Allergies:   Allergies   Allergen Reactions    Pork-Derived Products Anaphylaxis       Home Meds:    Current Facility-Administered Medications on File Prior to Encounter   Medication Dose Route Frequency Provider Last Rate Last Admin    dexmedetomidine HCl (PRECEDEX) injection   Perineural PRN Mateusz Davidson, CRNA   50 mcg at 08/08/24 1042     Current Outpatient Medications on File Prior to Encounter   Medication Sig Dispense Refill    Abilify 2 MG tablet Take 1 tablet by mouth Daily.      Boric Acid suppository Suppository Insert 1 suppository into the vagina 3 (Three) Times a Week. 36 suppository 3    buPROPion XL (WELLBUTRIN XL) 150 MG 24 hr tablet Take 1 tablet by mouth Every Morning.      lamoTRIgine (LaMICtal) 150 MG tablet Take 1 tablet by mouth Daily.         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 She states she only drinks Gatorade.    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  Follows with psychiatry, on Prozac, previously on Zyprexa.    Recurrent pregnancy loss, antepartum condition or complication      PSH:    Past Surgical History:   Procedure Laterality Date    ABDOMINOPLASTY N/A 2022    Procedure: ABDOMINOPLASTY WITH LIPOSUCTION;  Surgeon: Frederick Betts MD;  Location:  [facility];  Service: Plastics;  Laterality: N/A;    BREAST AUGMENTATION Bilateral 2022    Procedure: BREAST AUGMENTATION BILATERAL WITH SILICONE IMPLANTS;  Surgeon: Frederick Betts MD;  Location:  [facility];  Service: Plastics;  Laterality: Bilateral;     SECTION N/A 2024    Procedure:  SECTION REPEAT;  Surgeon: Raul Cali MD;  Location: [facility] LABOR DELIVERY;  Service: Obstetrics/Gynecology;  Laterality: N/A;     SECTION PRIMARY  2022    CHOLECYSTECTOMY N/A 2021    Procedure: CHOLECYSTECTOMY LAPAROSCOPIC;  Surgeon: Amrita Roche MD;  Location:  [facility];  Service: General;  Laterality: N/A;    GASTRIC SLEEVE LAPAROSCOPIC  20.  38 Khmer Bougie.  op note rviewed    HERNIA REPAIR      4 years ago    UPPER GASTROINTESTINAL ENDOSCOPY      Couple of times    VENTRAL/INCISIONAL HERNIA REPAIR N/A 2022    Procedure: VENTRAL HERNIA REPAIR OPEN;  Surgeon: Segundo Morales MD;  Location:  [facility];  Service: General;  Laterality: N/A;       Immunization History:  Influenza: due  Pneumococcal: UTD  Tetanus: unsure    Social History:   Tobacco:   Social History     Tobacco Use   Smoking Status Never    Passive exposure: Never   Smokeless Tobacco Never      Alcohol:     Social History     Substance and Sexual Activity   Alcohol Use Never        Vitals:          Vitals can be found in the EMR     Physical Exam:  General Appearance:    Alert, cooperative, no distress, appears stated age   Head:    Normocephalic, without obvious abnormality, atraumatic   Lungs:     Clear to auscultation bilaterally, respirations unlabored    Heart:   Regular rate and rhythm, no murmur, rub or gallop    Abdomen:    Soft, nontender. No rigidity or guarding.    Breast Exam:    deferred   Genitalia:    deferred   Extremities:   Extremities normal, atraumatic, no cyanosis or edema   Skin:   Skin color, texture, turgor normal, no rashes or lesions   Neurologic:   Grossly intact   Results Review  LABS:  Lab Results   Component Value Date    WBC 6.44 07/22/2025    HGB 12.6 07/22/2025    HCT 38.1 07/22/2025    MCV 88.8 07/22/2025     07/22/2025    NEUTROABS 3.85 08/10/2024    GLUCOSE 79 07/22/2025    BUN 9.1 07/22/2025    CREATININE 0.66 07/22/2025    EGFRIFNONA >60 05/04/2022    EGFRIFAFRI >60 05/04/2022     (L) 07/22/2025    K 4.3 07/22/2025     07/22/2025    CO2 22.0 07/22/2025    MG 2.1 08/07/2024    PHOS 3.5 08/07/2024    CALCIUM 8.7 07/22/2025    ALBUMIN 4.1 07/22/2025    AST 27 07/22/2025    ALT 33 07/22/2025    BILITOT 0.4 07/22/2025    INR 1.10 08/07/2024       RADIOLOGY:  No radiology results for the last 3 days     I reviewed the patient's new imaging results and agree with the interpretation.    Impression: encounter for cosmetic surgery     Plan: mini abdominoplasty and a bilateral breast augmentation revision    Alexis White PA-C   7/29/2025   06:52 EDT

## 2025-07-31 DIAGNOSIS — B37.31 YEAST VAGINITIS: Primary | ICD-10-CM

## 2025-07-31 RX ORDER — FLUCONAZOLE 150 MG/1
150 TABLET ORAL DAILY
Qty: 2 TABLET | Refills: 0 | Status: SHIPPED | OUTPATIENT
Start: 2025-07-31

## 2025-08-04 RX ORDER — SEMAGLUTIDE 2.4 MG/.75ML
2.4 INJECTION, SOLUTION SUBCUTANEOUS WEEKLY
Qty: 3 ML | Refills: 1 | Status: CANCELLED | OUTPATIENT
Start: 2025-08-04 | End: 2025-09-03

## 2025-08-05 ENCOUNTER — OFFICE VISIT (OUTPATIENT)
Dept: BARIATRICS/WEIGHT MGMT | Facility: CLINIC | Age: 35
End: 2025-08-05
Payer: MEDICAID

## 2025-08-05 VITALS
SYSTOLIC BLOOD PRESSURE: 108 MMHG | DIASTOLIC BLOOD PRESSURE: 70 MMHG | HEIGHT: 66 IN | HEART RATE: 80 BPM | BODY MASS INDEX: 23.56 KG/M2 | WEIGHT: 146.6 LBS

## 2025-08-05 DIAGNOSIS — Z76.89 ENCOUNTER FOR WEIGHT MANAGEMENT: Primary | ICD-10-CM

## 2025-08-05 RX ORDER — METFORMIN HYDROCHLORIDE 500 MG/1
1000 TABLET, EXTENDED RELEASE ORAL
Qty: 60 TABLET | Refills: 2 | Status: SHIPPED | OUTPATIENT
Start: 2025-08-05

## 2025-08-22 ENCOUNTER — HOSPITAL ENCOUNTER (EMERGENCY)
Facility: HOSPITAL | Age: 35
Discharge: HOME | End: 2025-08-23
Attending: STUDENT IN AN ORGANIZED HEALTH CARE EDUCATION/TRAINING PROGRAM
Payer: MEDICAID

## 2025-08-23 ENCOUNTER — APPOINTMENT (OUTPATIENT)
Facility: HOSPITAL | Age: 35
End: 2025-08-23
Payer: MEDICAID

## 2025-08-26 ENCOUNTER — OFFICE VISIT (OUTPATIENT)
Dept: OBSTETRICS AND GYNECOLOGY | Facility: CLINIC | Age: 35
End: 2025-08-26
Payer: MEDICAID

## 2025-08-26 VITALS
HEIGHT: 66 IN | SYSTOLIC BLOOD PRESSURE: 96 MMHG | BODY MASS INDEX: 23.53 KG/M2 | DIASTOLIC BLOOD PRESSURE: 68 MMHG | WEIGHT: 146.4 LBS

## 2025-08-26 DIAGNOSIS — Z30.431 SURVEILLANCE OF PREVIOUSLY PRESCRIBED INTRAUTERINE CONTRACEPTIVE DEVICE: Primary | ICD-10-CM

## 2025-08-26 DIAGNOSIS — N89.8 VAGINAL ITCHING: ICD-10-CM

## 2025-08-26 DIAGNOSIS — Z30.431 SURVEILLANCE OF INTRAUTERINE CONTRACEPTIVE DEVICE: ICD-10-CM

## 2025-08-26 RX ORDER — SEMAGLUTIDE 1.7 MG/.75ML
INJECTION, SOLUTION SUBCUTANEOUS
COMMUNITY
Start: 2025-08-21

## 2025-08-26 RX ORDER — FLUCONAZOLE 150 MG/1
TABLET ORAL
Qty: 3 TABLET | Refills: 0 | Status: SHIPPED | OUTPATIENT
Start: 2025-08-26

## 2025-08-30 LAB
A VAGINAE DNA VAG QL NAA+PROBE: NORMAL SCORE
BVAB2 DNA VAG QL NAA+PROBE: NORMAL SCORE
C ALBICANS DNA VAG QL NAA+PROBE: NEGATIVE
C GLABRATA DNA VAG QL NAA+PROBE: NEGATIVE
C KRUSEI DNA VAG QL NAA+PROBE: NEGATIVE
C LUSITANIAE DNA VAG QL NAA+PROBE: NEGATIVE
CANDIDA DNA VAG QL NAA+PROBE: NEGATIVE
M GENITALIUM DNA SPEC QL NAA+PROBE: NEGATIVE
M HOMINIS DNA SPEC QL NAA+PROBE: NEGATIVE
MEGA1 DNA VAG QL NAA+PROBE: NORMAL SCORE
T VAGINALIS DNA VAG QL NAA+PROBE: NEGATIVE
UREAPLASMA DNA SPEC QL NAA+PROBE: NEGATIVE

## (undated) DEVICE — ELECTRD BLD EZ CLN MOD XLNG 2.75IN

## (undated) DEVICE — SUT PDS 1 CT1 18IN Z741D

## (undated) DEVICE — TRY SPINE BLCK WHITACRE 25G 3X5IN

## (undated) DEVICE — TOTAL TRAY, 16FR 10ML SIL FOLEY, URN: Brand: MEDLINE

## (undated) DEVICE — GLV SURG SENSICARE PI MIC PF SZ8 LF STRL

## (undated) DEVICE — ANTIBACTERIAL UNDYED BRAIDED (POLYGLACTIN 910), SYNTHETIC ABSORBABLE SUTURE: Brand: COATED VICRYL

## (undated) DEVICE — DRSNG GZ PETROLTM XEROFORM CURAD 1X8IN STRL

## (undated) DEVICE — FLTR PLUMEPORT LAP W/CONN STRL

## (undated) DEVICE — SOL IRR NACL 0.9PCT BT 1000ML

## (undated) DEVICE — TBG PENCL TELESCP MEGADYNE SMOKE EVAC 10FT

## (undated) DEVICE — TBG SXN LIPECTOMY 108IN

## (undated) DEVICE — SOL IRR H2O BTL 1000ML STRL

## (undated) DEVICE — ENDOPATH XCEL BLADELESS TROCARS WITH STABILITY SLEEVES: Brand: ENDOPATH XCEL

## (undated) DEVICE — GLV SURG SENSICARE PI MIC PF SZ6.5 LF STRL

## (undated) DEVICE — [HIGH FLOW INSUFFLATOR,  DO NOT USE IF PACKAGE IS DAMAGED,  KEEP DRY,  KEEP AWAY FROM SUNLIGHT,  PROTECT FROM HEAT AND RADIOACTIVE SOURCES.]: Brand: PNEUMOSURE

## (undated) DEVICE — BLANKT WARM UNDER/BDY FUL/ACC A/ 90X206CM

## (undated) DEVICE — DRSNG SURESITE WNDW 2.38X2.75

## (undated) DEVICE — SUT PDS 0 CTX 36IN DYED Z370T

## (undated) DEVICE — DISPOSABLE BIPOLAR FORCEPS 7 3/4" (19.7CM) SCOVILLE BAYONET, INSULATED, 1.5MM TIP AND 12 FT. (3.6M) CABLE: Brand: KIRWAN

## (undated) DEVICE — MAT PREVALON MOBL TRANSFR AIR WO/PAD 39X80IN

## (undated) DEVICE — SUT MNCRYL 3/0 27L Y523H BX/36

## (undated) DEVICE — SYS CLS SKIN PREMIERPRO EXOFINFUSION 22CM

## (undated) DEVICE — SUT SILK 2/0 TIES 18IN A185H

## (undated) DEVICE — PK CHST BRST 10

## (undated) DEVICE — BIOPATCH™ ANTIMICROBIAL DRESSING WITH CHLORHEXIDINE GLUCONATE IS A HYDROPHILLIC POLYURETHANE ABSORPTIVE FOAM WITH CHLORHEXIDINE GLUCONATE (CHG) WHICH INHIBITS BACTERIAL GROWTH UNDER THE DRESSING. THE DRESSING IS INTENDED TO BE USED TO ABSORB EXUDATE, COVER A WOUND CAUSED BY VASCULAR AND NONVASCULAR PERCUTANEOUS MEDICAL DEVICES DURING SURGERY, AS WELL AS REDUCE LOCAL INFECTION AND COLONIZATION OF MICROORGANISMS.: Brand: BIOPATCH

## (undated) DEVICE — Device

## (undated) DEVICE — BNDR ABD PREMIUM/UNIV 10IN 27TO48IN

## (undated) DEVICE — CLTH CLENS READYCLEANSE PERI CARE PK/5

## (undated) DEVICE — BLAKE SILICONE DRAIN, 15 FR ROUND, HUBLESS WITH 3/16" TROCAR: Brand: BLAKE

## (undated) DEVICE — ENDOPATH XCEL UNIVERSAL TROCAR STABLILITY SLEEVES: Brand: ENDOPATH XCEL

## (undated) DEVICE — SUT PLAIN  3/0 CT1 27IN 842H

## (undated) DEVICE — SUT MNCRYL 5/0 PC1 18IN Y834G

## (undated) DEVICE — 3M™ IOBAN™ 2 ANTIMICROBIAL INCISE DRAPE 6650EZ: Brand: IOBAN™ 2

## (undated) DEVICE — SUT ETHLN 0 LP XLH 72IN D5854

## (undated) DEVICE — GOWN,PREVENTION PLUS,XXLARGE,STERILE: Brand: MEDLINE

## (undated) DEVICE — SUT GUT PLN FAST ABS 5/0 PC1 18IN 1915G

## (undated) DEVICE — SUT PROLN 2/0 PC3 8833H

## (undated) DEVICE — SYR CONTRL PRESS/LO FIX/M/LL W/THMB/RNG 10ML

## (undated) DEVICE — PK C/SECT 10

## (undated) DEVICE — STPLR SKIN SUBCUTICULAR INSORB 2030

## (undated) DEVICE — TRAP FLD MINIVAC MEGADYNE 100ML

## (undated) DEVICE — BNDG ELAS W/CLIP 6IN 10YD LF STRL

## (undated) DEVICE — BANDAGE,GAUZE,BULKEE II,4.5"X4.1YD,STRL: Brand: MEDLINE

## (undated) DEVICE — DRSNG GZ CURAD XEROFORM NONADHR OVERWRAP 5X9IN

## (undated) DEVICE — PATIENT RETURN ELECTRODE, SINGLE-USE, CONTACT QUALITY MONITORING, ADULT, WITH 9FT CORD, FOR PATIENTS WEIGING OVER 33LBS. (15KG): Brand: MEGADYNE

## (undated) DEVICE — CVR HNDL LIGHT RIGID

## (undated) DEVICE — INTENDED USE FOR SURGICAL MARKING ON INTACT SKIN, ALSO PROVIDES A PERMANENT METHOD OF IDENTIFYING OBJECTS IN THE OPERATING ROOM: Brand: WRITESITE® REGULAR TIP SKIN MARKER

## (undated) DEVICE — PK BARIATRIC 10

## (undated) DEVICE — LEX GENERAL ABDOMINAL SPLIT: Brand: MEDLINE INDUSTRIES, INC.

## (undated) DEVICE — GLV SURG SENSICARE PI MIC PF SZ8.5 LF STRL

## (undated) DEVICE — DRSNG PAD ABD 8X10IN STRL

## (undated) DEVICE — SUT MNCRYL PLS ANTIB UD 3/0 PS2 27IN

## (undated) DEVICE — JACKSON-PRATT 100CC BULB RESERVOIR: Brand: CARDINAL HEALTH

## (undated) DEVICE — GLV SURG BIOGEL LTX PF 7 1/2

## (undated) DEVICE — TROCAR: Brand: KII FIOS FIRST ENTRY

## (undated) DEVICE — GLV SURG DERMASSURE GRN LF PF 7.0

## (undated) DEVICE — APPL COTN TP PLSTC 6IN STRL LF PK/2

## (undated) DEVICE — INTENDED FOR TISSUE SEPARATION, AND OTHER PROCEDURES THAT REQUIRE A SHARP SURGICAL BLADE TO PUNCTURE OR CUT.: Brand: BARD-PARKER ® STAINLESS STEEL BLADES

## (undated) DEVICE — PROXIMATE RH ROTATING HEAD SKIN STAPLERS (35 WIDE) CONTAINS 35 STAINLESS STEEL STAPLES: Brand: PROXIMATE

## (undated) DEVICE — GLOVE,SURG,SENSICARE,ALOE,LF,PF,7: Brand: MEDLINE

## (undated) DEVICE — VIOLET BRAIDED (POLYGLACTIN 910), SYNTHETIC ABSORBABLE SUTURE: Brand: COATED VICRYL

## (undated) DEVICE — DEV DEL BRST/IMP GEL KELLER2 FUNNEL EA/5